# Patient Record
Sex: FEMALE | Race: WHITE | NOT HISPANIC OR LATINO | Employment: OTHER | URBAN - METROPOLITAN AREA
[De-identification: names, ages, dates, MRNs, and addresses within clinical notes are randomized per-mention and may not be internally consistent; named-entity substitution may affect disease eponyms.]

---

## 2017-04-03 ENCOUNTER — TELEPHONE (OUTPATIENT)
Dept: ALLERGY | Facility: CLINIC | Age: 77
End: 2017-04-03

## 2017-04-03 NOTE — TELEPHONE ENCOUNTER
----- Message from Vanessa Haas sent at 4/3/2017 12:31 PM CDT -----  Contact: self  Patient stated that she has welps & swelling on face & neck for longer than 24 hours, she did go to Dermatology & they referred her to Dr Benitez.   the earliest available was 5/2/17 she took that appointment but she really wants to Dr Benitez sooner if at all possible.    Patient's # is [phone]

## 2017-04-17 ENCOUNTER — TELEPHONE (OUTPATIENT)
Dept: ALLERGY | Facility: CLINIC | Age: 77
End: 2017-04-17

## 2017-04-17 ENCOUNTER — HOSPITAL ENCOUNTER (EMERGENCY)
Facility: HOSPITAL | Age: 77
Discharge: HOME | End: 2017-04-17
Attending: EMERGENCY MEDICINE
Payer: MEDICARE

## 2017-04-17 VITALS
DIASTOLIC BLOOD PRESSURE: 72 MMHG | HEART RATE: 74 BPM | WEIGHT: 142 LBS | RESPIRATION RATE: 18 BRPM | SYSTOLIC BLOOD PRESSURE: 168 MMHG | TEMPERATURE: 98 F | HEIGHT: 64 IN | BODY MASS INDEX: 24.24 KG/M2 | OXYGEN SATURATION: 98 %

## 2017-04-17 DIAGNOSIS — T78.40XA ALLERGIC REACTION, INITIAL ENCOUNTER: Primary | ICD-10-CM

## 2017-04-17 PROCEDURE — 99283 EMERGENCY DEPT VISIT LOW MDM: CPT

## 2017-04-17 PROCEDURE — 63600175 PHARM REV CODE 636 W HCPCS: Performed by: PHYSICIAN ASSISTANT

## 2017-04-17 PROCEDURE — 99284 EMERGENCY DEPT VISIT MOD MDM: CPT | Mod: ,,, | Performed by: PHYSICIAN ASSISTANT

## 2017-04-17 RX ORDER — PREDNISONE 20 MG/1
40 TABLET ORAL
Status: COMPLETED | OUTPATIENT
Start: 2017-04-17 | End: 2017-04-17

## 2017-04-17 RX ORDER — PREDNISONE 20 MG/1
40 TABLET ORAL DAILY
Qty: 10 TABLET | Refills: 0 | Status: SHIPPED | OUTPATIENT
Start: 2017-04-17 | End: 2017-04-22

## 2017-04-17 RX ADMIN — PREDNISONE 40 MG: 20 TABLET ORAL at 11:04

## 2017-04-17 NOTE — ED AVS SNAPSHOT
[facility]  [address]  [facility] [address]               Gabriela Edwards   2017  9:41 PM   ED    Description:  Female : 1940   Department:  [facility]           Your Care was Coordinated By:     Provider Role From To    Daisha Mckoy MD Attending Provider 17 --    NATE HansenC Physician Assistant 17 --      Reason for Visit     Allergic Reaction           Diagnoses this Visit        Comments    Allergic reaction, initial encounter    -  Primary       ED Disposition     None           To Do List           Follow-up Information     Follow up with Saravanan aHas MD. Schedule an appointment as soon as possible for a visit in 1 week.    Specialty:  Pulmonary Disease    Contact information:    [address] [facility] [address]  [address]  [facility] [address]  [phone]          Follow up with Allergy . Schedule an appointment as soon as possible for a visit on 2017.      [facility]     [facility] Care [facility] -  Assistance  Unless otherwise directed by your provider, please contact [facility], our nurse care line that is available for  assistance.     Registered nurses in the [facility] provide: appointment scheduling, clinical advisement, health education, and other advisory services.  Call: [phone] (toll free)               Medications           Message regarding Medications     Verify the changes and/or additions to your medication regime listed below are the same as discussed with your clinician today.  If any of these changes or additions are incorrect, please notify your healthcare provider.             Verify that the below list of medications is an accurate representation of the medications you are currently taking.  If none reported, the list may be blank. If incorrect, please contact your healthcare provider. Carry this list with you in case of  "emergency.           Current Medications     levetiracetam (KEPPRA XR) 500 mg Tb24 Take 500 mg by mouth every evening. Pt takes 5 tablets nightly for a total of 2500mg           Clinical Reference Information           Your Vitals Were     BP Pulse Temp Resp Height Weight    172/85 (BP Location: Right arm, Patient Position: Sitting) 71 97.7 °F (36.5 °C) (Oral) 19 5' 4" (1.626 m) 64.4 kg (142 lb)    SpO2 BMI             100% 24.37 kg/m2         Allergies as of 4/17/2017     No Known Allergies      Immunizations Administered on Date of Encounter - 4/17/2017     None      ED Micro, Lab, POCT     None      ED Imaging Orders     None        Discharge Instructions         Drug Reaction: Allergic  You are having an allergic reaction to a drug you have taken. This causes an itchy rash and sometimes swelling of various parts of the body. It may also cause trouble swallowing or breathing. The rash may take a few hours or up to 2 weeks to go away. In the future, remember to tell your doctor about your allergy to this drug so that drugs of this type won't be used again.  Any medicine can cause an allergic reaction. However, penicillin and related drugs, sulfa drugs, aspirin, ibuprofen, and seizure medicines cause the most allergic reactions. Vaccines may also trigger allergies. People whose parents or siblings have allergies are at a higher risk of developing a drug allergy. Allergy testing may sometimes be required to determine the cause.  Symptoms may occur within minutes, hours, or even weeks after exposure to the drug. It can be a mild or severe reaction, or potentially life threatening. Most of us think of allergic reactions when we have a rash or itchy skin. Symptoms can include:  · Rash, hives, redness, welts, blisters  · Itching, burning, stinging, pain  · Dry, flaky, cracking, scaly skin  · Swelling of the face, lips or other parts of the body  · Fever.  Sometimes fever is the only symptom of a drug reaction.  In " elderly people, the risk of fever increases with the number of drugs the older person takes.  More severe symptoms include:  · Trouble swallowing, feeling like your throat is closing  · Trouble breathing, wheezing  · Hoarse voice or trouble speaking  · Nausea, vomiting, diarrhea, stomach cramps  · Feeling faint or lightheaded, rapid heart rate  Home care    The goal of treatment is to help relieve the symptoms, and get you feeling better. Mild to moderate drug reactions usually respond quickly to antihistamines and steroids. The rash will usually fade over several days, but can sometimes last a couple of weeks. Over the next couple of days, there may be times when it is gets a little worse, and then better again. Here are some things to do:  · Throw the drug away and do not take it again. The next reaction may be much worse.  · Add this drug reaction to your electronic medical record.  · When getting a new medicine, always tell the healthcare provider that you are allergic to this drug. Make certain they write it down in your medical record.  · Avoid tight clothing and anything that heats up your skin (hot showers/baths, direct sunlight) since heat will make itching worse.  · An ice pack (ice cubes in a plastic bag, wrapped in a thin towel, or a frozen bag of peas) will relieve local areas of intense itching and redness.  Don't put ice directly on the skin.  · Avoid scratching which may worsen the reaction, damage your skin and lead to an infection.  · Oral Benadryl (diphenhydramine) is an antihistamine available at drug and grocery stores. Unless a prescription antihistamine was given, Benadryl may be used to reduce itching if large areas of the skin are involved. It may make you sleepy, so be careful using it in the daytime or when going to school, working, or driving. [Note: Do not use Benadryl if you have glaucoma or if you are a man with trouble urinating due to an enlarged prostate.]  There are other  antihistamines that cause less drowsiness and are a good alternative for daytime use. Ask your pharmacist for suggestions.  · Do not use Benadryl cream on your skin, because in some people it can cause a further reaction, and make you allergic to Benadryl.  · Calamine lotion or oatmeal baths sometimes help with itching.  Follow-up care  Follow up with your healthcare provider, or as advised if your symptoms do not continue to improve or they get worse.  Call 911  Call 911 if any of these occur:  · Trouble breathing or swallowing, wheezing  · New or worsening swelling in the mouth, throat, or tongue  · Hoarse voice or trouble speaking   · Fainting or loss of consciousness  · Rapid heart rate  · Low blood pressure  · Feeling of doom  · Nausea, vomiting, abdominal pain, diarrhea  When to seek medical advice  Call your healthcare provider right away if any of these occur:  · Shortness of breath  · Increased swelling in the face, eyelids, or lips  · Dizziness, weakness  · Continuing or recurring symptoms  · Spreading areas of itching, redness or swelling  · Signs of infection:  ¨ Spreading redness  ¨ Increased pain or swelling  ¨ Fever (1 degree above your normal temperature) lasting for 24 to 48 hours Or, whatever your healthcare provider told you to report based on your medical condition  ¨ Colored fluid draining from the inflamed area  Date Last Reviewed: 7/30/2015  © 8115-3456 [employer]. [address] [place] [address]. All rights reserved. This information is not intended as a substitute for professional medical care. Always follow your healthcare professional's instructions.          Your Scheduled Appointments     Apr 19, 2017  2:00 PM CDT   New Patient with MD Joshua Sosa - Allergy/ Immunology ([facility])    [address]  [facility] [address]   [phone]              [facility] Sign-Up     Activating your [email or web address] account is  as easy as 1-2-3!     1) Visit [email or web address], select Sign Up Now, enter this activation code and your date of birth, then select Next.  [email or web address]  Expires: 6/1/2017 11:06 PM      2) Create a username and password to use when you visit [facility] in the future and select a security question in case you lose your password and select Next.    3) Enter your e-mail address and click Sign Up!    Additional Information  If you have questions, please e-mail [email or web address] or call [phone] to talk to our [employer] staff. Remember, [facility] is NOT to be used for urgent needs. For medical emergencies, dial 911.          [facility] complies with applicable Federal civil rights laws and does not discriminate on the basis of race, color, national origin, age, disability, or sex.        Language Assistance Services     ATTENTION: Language assistance services are available, free of charge. Please call [phone].      ATENCIÓN: Si habla jason, tiene a camacho disposición servicios gratuitos de asistencia lingüística. Llame al [phone].     CHÚ Ý: N?u b?n nói Ti?ng Vi?t, có các d?ch v? h? tr? ngôn ng? mi?n phí dành cho b?n. G?i s? [phone].

## 2017-04-17 NOTE — TELEPHONE ENCOUNTER
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    Pt can be reached at [phone] or [phone].

## 2017-04-18 ENCOUNTER — TELEPHONE (OUTPATIENT)
Dept: ALLERGY | Facility: CLINIC | Age: 77
End: 2017-04-18

## 2017-04-18 NOTE — TELEPHONE ENCOUNTER
----- Message from Tierra Vera sent at 4/18/2017  9:30 AM CDT -----  Contact: Self/[phone] cell   Pt said that she is calling in regards to she was in the E.R yesterday at [facility] and pt was told to call the office to get an appointment today, pt stated that she has some medication so if she cannot get in today that she will just wait until the appointment that she has for tomorrow. Please call and advise            Thank you

## 2017-04-18 NOTE — ED PROVIDER NOTES
Encounter Date: 4/17/2017    SCRIBE #1 NOTE: I, Guillermo Plascencia, am scribing for, and in the presence of, Shobha Luciano PA-C.   SCRIBE #2 NOTE: I, Raffaele Arango, roberth scribing for, and in the presence of,  Dr. Duong. I have scribed the following portions of the note - the APC attestation.     History     Chief Complaint   Patient presents with    Allergic Reaction     since january pt has had 3 steriod shots which relieve and then comes back. pt has a redness throughout body. swelling to bilateral ankles. pt denies chest pain, SOB     Review of patient's allergies indicates:  No Known Allergies  HPI Comments: Time seen by provider: 10:12 PM    This is a 76 y.o. female who presents with complaint of generalized rash and bilateral lower extremity  swelling down to her ankles.  Pt states having multiple episodes of similar rash since January 2017, has had decadron in the past with improvement. She states that this episode started about 1 week ago, for which she had decadron injection 2 days ago by her dermatologist with improvement, but then had re-exacerbation of the rash this morning.     Pt states the rashes come irregularly, usually starting with erythema at her neck which then propagates to her chest and ears. However, this is apparently the first time the rash has extended to her bilateral upper and lower extremities and with associated swelling. She states she has not started using any new detergents, has not had recent significant sun exposure, or any recent change in diet or medications.  Pt states taking Keppra for the last 10 years for seizures but no other medications.  She does report history of eczema and uses an ointment for this. Pt denies any other medical complaints at this time.  No difficulty swallowing or shortness of breath.  Due to above symptoms patient did present to the emergency department.     The history is provided by the patient.     Past Medical History:   Diagnosis Date    Cataract      Epilepsy      Past Surgical History:   Procedure Laterality Date    WRIST SURGERY Right     2009     Family History   Problem Relation Age of Onset    Cancer Brother      Social History   Substance Use Topics    Smoking status: Never Smoker    Smokeless tobacco: None    Alcohol use 0.6 oz/week     1 Glasses of wine per week     Review of Systems   Constitutional: Negative for fever.   HENT: Negative for congestion.    Eyes: Negative for visual disturbance.   Respiratory: Negative for shortness of breath.    Cardiovascular: Positive for leg swelling. Negative for chest pain.   Gastrointestinal: Negative for abdominal pain.   Genitourinary: Negative for difficulty urinating.   Musculoskeletal: Negative for neck pain.   Skin: Positive for rash (rash to extremities, chest, and neck). Negative for pallor.   Neurological: Negative for headaches.       Physical Exam   Initial Vitals   BP Pulse Resp Temp SpO2   04/17/17 2034 04/17/17 2034 04/17/17 2034 04/17/17 2034 04/17/17 2034   172/85 71 19 97.7 °F (36.5 °C) 100 %     Physical Exam    Nursing note and vitals reviewed.  Constitutional: She appears well-developed and well-nourished. She is not diaphoretic. No distress.   HENT:   Mouth/Throat: Oropharynx is clear and moist. No oropharyngeal exudate.   Eyes: EOM are normal. Pupils are equal, round, and reactive to light. No scleral icterus.   Neck: Normal range of motion. Neck supple. No thyromegaly present. No tracheal deviation present.   Cardiovascular: Normal rate, regular rhythm and normal heart sounds. Exam reveals no gallop and no friction rub.    No murmur heard.  Pulmonary/Chest: Breath sounds normal. No stridor. She has no wheezes. She has no rhonchi. She has no rales.   Abdominal: Soft. There is no tenderness. There is no rebound and no guarding.   Musculoskeletal: She exhibits edema.   Neurological: She is alert and oriented to person, place, and time. She has normal strength. No cranial nerve deficit or  sensory deficit.   Skin: Skin is warm and dry. Rash noted. There is erythema.   Redness across chest, abdomen, and bilateral lower extremities. Skin flaking on head and neck.  Lower extremity edema    Psychiatric: She has a normal mood and affect. Thought content normal.         ED Course   Procedures  Labs Reviewed - No data to display          Medical Decision Making:   History:   Old Medical Records: I decided to obtain old medical records.       APC / Resident Notes:   This is a 76 y.o. female who presents with complaint of generalized rash and bilateral lower extremity swelling down to her ankles.  Physical exam reveals anxious female in no acute distress.  Heart regular rate and rhythm.  Lungs clear to auscultation bilaterally.  Erythematous rash on neck extending to her chest and upper extremities bilaterally and abdomen.  No oral lesions noted.  Lower extremity edema noted.    Patient will be given 40 mg of prednisone ×5 days and is to keep appointment with allergist on Wednesday.  Patient told to return if symptoms worsen or patient develops trouble swallowing or shortness of breath.  Plan of treatment discussed with attending physician and she is agreeable.           Scribe Attestation:   Scribe #1: I performed the above scribed service and the documentation accurately describes the services I performed. I attest to the accuracy of the note.  Scribe #2: I performed the above scribed service and the documentation accurately describes the services I performed. I attest to the accuracy of the note.    Attending Attestation:     Physician Attestation Statement for NP/PA:   I discussed this assessment and plan of this patient with the NP/PA, but I did not personally examine the patient. The face to face encounter was performed by the NP/PA.    Other NP/PA Attestation Additions:    History of Present Illness: 76 y.o. woman complains of all over body redness, rash, itching. Seen by dermatology and given a dose of  decadron. Scheduled to see allergy on the 19th. Had some improvement with the decadron but now reports rash is worse.    Medical Decision Making: Will place on short course of oral steroids prior to seeing allergy in 2 days. No evidence of mucous membrane involvement        Physician Attestation for Scribe:  Physician Attestation Statement for Scribe #1: I, Shobha Luciano PA-C, reviewed documentation, as scribed by Guillermo Plascencia in my presence, and it is both accurate and complete.   Physician Attestation Statement for Scribe #2: I, Dr. Duong, reviewed documentation, as scribed by Raffaele Arango in my presence, and it is both accurate and complete. I also acknowledge and confirm the content of the note done by Scribe #1.              ED Course     Clinical Impression:   The encounter diagnosis was Allergic reaction, initial encounter.          Shobha Luciano PA-C  04/18/17 0022

## 2017-04-18 NOTE — ED TRIAGE NOTES
Pt with history of allergic reaction. Pt states that she is supposed to see an Allergist this week on Wednesday. Pt received Decadron shot Saturday and was told to come to ED if symptoms got worse. Pt denies SOB or C/P. Pt has red rash all over body from neck down. Pt c/o intermittent itching.

## 2017-04-18 NOTE — DISCHARGE INSTRUCTIONS
Drug Reaction: Allergic  You are having an allergic reaction to a drug you have taken. This causes an itchy rash and sometimes swelling of various parts of the body. It may also cause trouble swallowing or breathing. The rash may take a few hours or up to 2 weeks to go away. In the future, remember to tell your doctor about your allergy to this drug so that drugs of this type won't be used again.  Any medicine can cause an allergic reaction. However, penicillin and related drugs, sulfa drugs, aspirin, ibuprofen, and seizure medicines cause the most allergic reactions. Vaccines may also trigger allergies. People whose parents or siblings have allergies are at a higher risk of developing a drug allergy. Allergy testing may sometimes be required to determine the cause.  Symptoms may occur within minutes, hours, or even weeks after exposure to the drug. It can be a mild or severe reaction, or potentially life threatening. Most of us think of allergic reactions when we have a rash or itchy skin. Symptoms can include:  · Rash, hives, redness, welts, blisters  · Itching, burning, stinging, pain  · Dry, flaky, cracking, scaly skin  · Swelling of the face, lips or other parts of the body  · Fever.  Sometimes fever is the only symptom of a drug reaction.  In elderly people, the risk of fever increases with the number of drugs the older person takes.  More severe symptoms include:  · Trouble swallowing, feeling like your throat is closing  · Trouble breathing, wheezing  · Hoarse voice or trouble speaking  · Nausea, vomiting, diarrhea, stomach cramps  · Feeling faint or lightheaded, rapid heart rate  Home care    The goal of treatment is to help relieve the symptoms, and get you feeling better. Mild to moderate drug reactions usually respond quickly to antihistamines and steroids. The rash will usually fade over several days, but can sometimes last a couple of weeks. Over the next couple of days, there may be times when it is  gets a little worse, and then better again. Here are some things to do:  · Throw the drug away and do not take it again. The next reaction may be much worse.  · Add this drug reaction to your electronic medical record.  · When getting a new medicine, always tell the healthcare provider that you are allergic to this drug. Make certain they write it down in your medical record.  · Avoid tight clothing and anything that heats up your skin (hot showers/baths, direct sunlight) since heat will make itching worse.  · An ice pack (ice cubes in a plastic bag, wrapped in a thin towel, or a frozen bag of peas) will relieve local areas of intense itching and redness.  Don't put ice directly on the skin.  · Avoid scratching which may worsen the reaction, damage your skin and lead to an infection.  · Oral Benadryl (diphenhydramine) is an antihistamine available at drug and grocery stores. Unless a prescription antihistamine was given, Benadryl may be used to reduce itching if large areas of the skin are involved. It may make you sleepy, so be careful using it in the daytime or when going to school, working, or driving. [Note: Do not use Benadryl if you have glaucoma or if you are a man with trouble urinating due to an enlarged prostate.]  There are other antihistamines that cause less drowsiness and are a good alternative for daytime use. Ask your pharmacist for suggestions.  · Do not use Benadryl cream on your skin, because in some people it can cause a further reaction, and make you allergic to Benadryl.  · Calamine lotion or oatmeal baths sometimes help with itching.  Follow-up care  Follow up with your healthcare provider, or as advised if your symptoms do not continue to improve or they get worse.  Call 911  Call 911 if any of these occur:  · Trouble breathing or swallowing, wheezing  · New or worsening swelling in the mouth, throat, or tongue  · Hoarse voice or trouble speaking   · Fainting or loss of consciousness  · Rapid  heart rate  · Low blood pressure  · Feeling of doom  · Nausea, vomiting, abdominal pain, diarrhea  When to seek medical advice  Call your healthcare provider right away if any of these occur:  · Shortness of breath  · Increased swelling in the face, eyelids, or lips  · Dizziness, weakness  · Continuing or recurring symptoms  · Spreading areas of itching, redness or swelling  · Signs of infection:  ¨ Spreading redness  ¨ Increased pain or swelling  ¨ Fever (1 degree above your normal temperature) lasting for 24 to 48 hours Or, whatever your healthcare provider told you to report based on your medical condition  ¨ Colored fluid draining from the inflamed area  Date Last Reviewed: 7/30/2015 © 2000-2016 [employer]. [address] [place] [address], [place] [address]. All rights reserved. This information is not intended as a substitute for professional medical care. Always follow your healthcare professional's instructions.

## 2017-04-19 ENCOUNTER — LAB VISIT (OUTPATIENT)
Dept: LAB | Facility: HOSPITAL | Age: 77
End: 2017-04-19
Attending: STUDENT IN AN ORGANIZED HEALTH CARE EDUCATION/TRAINING PROGRAM
Payer: MEDICARE

## 2017-04-19 ENCOUNTER — OFFICE VISIT (OUTPATIENT)
Dept: ALLERGY | Facility: CLINIC | Age: 77
End: 2017-04-19
Payer: MEDICARE

## 2017-04-19 VITALS
BODY MASS INDEX: 24.92 KG/M2 | SYSTOLIC BLOOD PRESSURE: 140 MMHG | OXYGEN SATURATION: 98 % | HEART RATE: 73 BPM | HEIGHT: 64 IN | WEIGHT: 145.94 LBS | DIASTOLIC BLOOD PRESSURE: 82 MMHG

## 2017-04-19 DIAGNOSIS — L30.9 ECZEMA, UNSPECIFIED TYPE: Primary | ICD-10-CM

## 2017-04-19 DIAGNOSIS — L29.9 PRURITUS: ICD-10-CM

## 2017-04-19 DIAGNOSIS — T78.3XXA ANGIOEDEMA, INITIAL ENCOUNTER: ICD-10-CM

## 2017-04-19 DIAGNOSIS — L50.9 URTICARIA: ICD-10-CM

## 2017-04-19 DIAGNOSIS — L50.9 HIVES: ICD-10-CM

## 2017-04-19 DIAGNOSIS — L30.9 ECZEMA, UNSPECIFIED TYPE: ICD-10-CM

## 2017-04-19 LAB
BASOPHILS # BLD AUTO: 0.02 K/UL
BASOPHILS NFR BLD: 0.2 %
DIFFERENTIAL METHOD: NORMAL
EOSINOPHIL # BLD AUTO: 0.5 K/UL
EOSINOPHIL NFR BLD: 6.4 %
ERYTHROCYTE [DISTWIDTH] IN BLOOD BY AUTOMATED COUNT: 14 %
HCT VFR BLD AUTO: 39.4 %
HGB BLD-MCNC: 13.2 G/DL
LYMPHOCYTES # BLD AUTO: 1.7 K/UL
LYMPHOCYTES NFR BLD: 20.9 %
MCH RBC QN AUTO: 30.1 PG
MCHC RBC AUTO-ENTMCNC: 33.5 %
MCV RBC AUTO: 90 FL
MONOCYTES # BLD AUTO: 0.8 K/UL
MONOCYTES NFR BLD: 9.6 %
NEUTROPHILS # BLD AUTO: 5.2 K/UL
NEUTROPHILS NFR BLD: 62.5 %
PLATELET # BLD AUTO: 276 K/UL
PMV BLD AUTO: 10.2 FL
RBC # BLD AUTO: 4.39 M/UL
TSH SERPL DL<=0.005 MIU/L-ACNC: 3.27 UIU/ML
WBC # BLD AUTO: 8.31 K/UL

## 2017-04-19 PROCEDURE — 86800 THYROGLOBULIN ANTIBODY: CPT

## 2017-04-19 PROCEDURE — 36415 COLL VENOUS BLD VENIPUNCTURE: CPT

## 2017-04-19 PROCEDURE — 99204 OFFICE O/P NEW MOD 45 MIN: CPT | Mod: S$PBB,GC,, | Performed by: STUDENT IN AN ORGANIZED HEALTH CARE EDUCATION/TRAINING PROGRAM

## 2017-04-19 PROCEDURE — 84165 PROTEIN E-PHORESIS SERUM: CPT

## 2017-04-19 PROCEDURE — 99999 PR PBB SHADOW E&M-EST. PATIENT-LVL III: CPT | Mod: PBBFAC,GC,, | Performed by: STUDENT IN AN ORGANIZED HEALTH CARE EDUCATION/TRAINING PROGRAM

## 2017-04-19 PROCEDURE — 84443 ASSAY THYROID STIM HORMONE: CPT

## 2017-04-19 PROCEDURE — 85025 COMPLETE CBC W/AUTO DIFF WBC: CPT

## 2017-04-19 PROCEDURE — 86376 MICROSOMAL ANTIBODY EACH: CPT

## 2017-04-19 PROCEDURE — 84165 PROTEIN E-PHORESIS SERUM: CPT | Mod: 26,,, | Performed by: PATHOLOGY

## 2017-04-19 NOTE — MR AVS SNAPSHOT
"    [facility] - Allergy/ Immunology  [address]  [place] [address]  Phone: [phone]  Fax: [phone]                  Gabriela Edwards   2017 2:00 PM   Office Visit    Description:  Female : 1940   Provider:  Daly Potter MD   Department:  [facility] - Allergy/ Immunology           Reason for Visit     Urticaria                To Do List           Goals (5 Years of Data)     None      [facility]     [facility] -  Assistance  Unless otherwise directed by your provider, please contact [facility], our nurse care line that is available for  assistance.     Registered nurses in the [facility] provide: appointment scheduling, clinical advisement, health education, and other advisory services.  Call: [phone] (toll free)               Medications           Message regarding Medications     Verify the changes and/or additions to your medication regime listed below are the same as discussed with your clinician today.  If any of these changes or additions are incorrect, please notify your healthcare provider.             Verify that the below list of medications is an accurate representation of the medications you are currently taking.  If none reported, the list may be blank. If incorrect, please contact your healthcare provider. Carry this list with you in case of emergency.           Current Medications     levetiracetam (KEPPRA XR) 500 mg Tb24 Take 500 mg by mouth every evening. Pt takes 5 tablets nightly for a total of 2500mg    predniSONE (DELTASONE) 20 MG tablet Take 2 tablets (40 mg total) by mouth once daily.           Clinical Reference Information           Your Vitals Were     BP Pulse Height Weight SpO2 BMI    140/82 (BP Location: Left arm, Patient Position: Sitting, BP Method: Manual) 73 5' 4" (1.626 m) 66.2 kg (145 lb 15.1 oz) 98% 25.05 kg/m2      Blood Pressure          Most Recent Value    BP  (!)  140/82    "   Allergies as of 4/19/2017     No Known Allergies      Immunizations Administered on Date of Encounter - 4/19/2017     None      [facility] Sign-Up     Activating your [facility] account is as easy as 1-2-3!     1) Visit [email or web address], select Sign Up Now, enter this activation code and your date of birth, then select Next.  [email or web address]  Expires: 6/1/2017 11:06 PM      2) Create a username and password to use when you visit [facility] in the future and select a security question in case you lose your password and select Next.    3) Enter your e-mail address and click Sign Up!    Additional Information  If you have questions, please e-mail [email or web address] or call [phone] to talk to our [facility] staff. Remember, [facility] is NOT to be used for urgent needs. For medical emergencies, dial 911.         Instructions    1. Moisturize the skin twice daily.  2.To apply triamcinolone 0.1% ointment twice daily to affected skin area until all lesions are resolved.  3.to apply tacrolimus 0.1% ointment to affected skin as per dermatology directed.  4.to take levocetirizine 5 mg 2 tablets twice daily  5.to take hydroxyzine HCL 25mg only for itching  6.Recommend to have skin biopsy at dermatology       Language Assistance Services     ATTENTION: Language assistance services are available, free of charge. Please call [phone].      ATENCIÓN: Si habla jason, tiene a camacho disposición servicios gratuitos de asistencia lingüística. Llame al [phone].     VALENTINA Ý: N?u b?n nói Ti?ng Vi?t, có các d?ch v? h? tr? ngôn ng? mi?n phí dành cho b?n. G?i s? [phone].         [facility] - [facility]/ [facility] complies with applicable Federal civil rights laws and does not discriminate on the basis of race, color, national origin, age, disability, or sex.

## 2017-04-19 NOTE — PROGRESS NOTES
ALLERGY & IMMUNOLOGY CLINIC - INITIAL CONSULTATION      HISTORY OF PRESENT ILLNESS     Patient ID: Gabriela Edwards is a 76 y.o. female    CC: poorly controlled eczema with urticaria and angioedema    HPI: This is 77 yo woman with h/o dust mite allergy and eczema presented here for evaluation. As per patient, she has been diagnosed with eczema 1.5yrs ago. She is seeing dermatology and she has been put on triamcinolone 0.1% ointment and tacrolimus 0.1% ointment twice daily. She uses eurecin moisturizer twice daily. However, she is not using steroid ointment regularly because of side effects of medication such as atrophy and scarring etc. Her eczema is on lower extremities, flexor of forearms and neck, back of ear, and face. It is also itchy. Now it flares up more frequently, and her skin not cleared up from eczema for the last 6 months.  Hives/urticaria: Last year, October, she developed hives also. It is very itchy. She takes atarax 3 times a day for itching.And takes levocetirizine 5mg daily. It takes 3 days -3 weeks to go away of urticaria.She is symptoms free for few days and then she has hives again. She has received injections of steroid for total 4 times in last 6 months for hives also. On 4/16/17, she c/o swelling of lower legs one day after steroid injection. She also c/o of hives. Today, hives were gone. But she still has swelling in her lower extremities.  H/o fever blisters: she was worsening of fever blisters for last 2 months, off and on. She has been treated with acyclovir 5% ointment and valacyclovir 500 mg.   Chronic rhinitis: she has rhinitis for years, she has skin tested and dust mite allergic when she was young. She had dust mite IT for 5 years from 14 yo to 21yo.Her symptoms are well-controlled with flonase nasal spray. And anti-histmaine as needed.   She also has patch testing done at dermatology, was positive to nickel,cabamix, 8-ehahx3-fwohpeoqjdhe-1,3 diol, imidazolidinyl urea.        "REVIEW OF SYSTEMS     CONST: no F/C/NS, +Anxiety, + hair loss,+ weight gain.  NEURO: no H/A, no weakness, no paresthesias  EYES: no discharge, no pruritus, no erythema  EARS: no hearing loss, no sensation of fullness  NOSE: no congestion, no rhinorrhea, no discharge, no itching, no sneezing  PULM: no SOB, no wheezing, no cough, no snoring  CV: no CP, no palpitations, no leg swelling  GI: no dysphagia, no heartburn, no pain, no N/V/D, no BRBPR/melena  URO: no dysuria, no hematuria, no nocturia  MSK: no joint pain, no muscle pain  DERM: today, she has eczema erythematous skin lesions on her lower extremities, upper arms, neck, chest ,back and back of the ears.c/o itching      MEDICAL HISTORY     MedHx: active problems reviewed  SurgHx: none  SocHx: ,. Lives alone , no pets. No dusts no molds. No smoke , no alcohol  FamHx: not significant of asthma, eczema ,immunodeficiency in the family.  Allergies: see below  Medications: MAR reviewed    H/o Asthma: denies  H/o Eczema: denies  Oral Allergy:  denies  Food Allergy: denies  Venom Allergy: denies  Latex Allergy: denies  Other Allergies: denies  Env/Occ: denies any evironmental or occupational exposures     PHYSICAL EXAM     VS: BP (!) 140/82 (BP Location: Left arm, Patient Position: Sitting, BP Method: Manual)  Pulse 73  Ht 5' 4" (1.626 m)  Wt 66.2 kg (145 lb 15.1 oz)  SpO2 98%  BMI 25.05 kg/m2  GENERAL: AAOx4, NAD, well-appearing, cooperative  EYES: PERRL, EOMI, no conjunctival injection, no discharge, no infraorbital shiners  EARS: external auditory canals normal B/L, TM normal B/L  NOSE: NT + and  B/L, stringing mucous, no polyps  ORAL: MMM, no ulcers, no thrush, no cobblestoning  NECK: supple, trachea midline, no thyromegaly, no LAD  LUNGS: CTAB, no w/r/c, no increased WOB  HEART: RRR, normal S1/S2, no m/g/r  ABDOMEN: BS present, soft, non-tender, non-distended, no HSM  EXTREMITIES: +2 distal pulses, no c/c/e  LYMPHATICS: no cervical/submandibular LAD, " no axillary LAD, no inguinal LAD  DERM:eczema erythematous skin lesions on her lower extremities, upper arms, neck, chest ,back and back of the ears.  NEURO: normal gait, no facial asymmetry       CHART REVIEW     Reviewed.     ASSESSMENT & PLAN     Gabriela Edwards is a 76 y.o. female with     Severe eczema: Adult onset eczema poorly controlled which could be due to not taking of steroid ointment as directed or underlying causes such as lymphoma or malignancy.  -to reinforce importance of medications to take regularly  -to apply triamcinolone 0.1% ointment twice daily and tacrolimus 0.1% ointment daily as directed per dermatology.  -suggest to get skin biopsy in dermatology office.    Hives with itchiness: She developed hives intermittently and last episode was with angioedema lower legs.   -to take zytrec 10mg tablets twice daily with pepcid 150mg twice daily.  -to obtain CBC,TSH, thyroid antibodies, thyroglobulin antibody,SPEP.    Monitor on fever blisters symptoms.  Follow up: 8 weeks    Discussed with Dr.Montellibano Pillo Potter MD  Allergy/Immunology Fellow

## 2017-04-19 NOTE — PATIENT INSTRUCTIONS
1. Moisturize the skin twice daily.  2.To apply triamcinolone 0.1% ointment twice daily to affected skin area until all lesions are resolved.  3.to apply tacrolimus 0.1% ointment to affected skin as per dermatology directed.  4.to take levocetirizine 5 mg 2 tablets twice daily  5.to take hydroxyzine HCL 25mg only for itching  6.Recommend to have skin biopsy at dermatology

## 2017-04-20 LAB
ALBUMIN SERPL ELPH-MCNC: 3.85 G/DL
ALPHA1 GLOB SERPL ELPH-MCNC: 0.3 G/DL
ALPHA2 GLOB SERPL ELPH-MCNC: 0.86 G/DL
B-GLOBULIN SERPL ELPH-MCNC: 0.8 G/DL
GAMMA GLOB SERPL ELPH-MCNC: 0.88 G/DL
PATHOLOGIST INTERPRETATION SPE: NORMAL
PROT SERPL-MCNC: 6.7 G/DL
THYROGLOB AB SERPL IA-ACNC: <4 IU/ML
THYROPEROXIDASE IGG SERPL-ACNC: <6 IU/ML

## 2017-04-20 RX ORDER — TRIAMCINOLONE ACETONIDE 1 MG/G
CREAM TOPICAL 2 TIMES DAILY
Qty: 454 G | Refills: 6 | Status: SHIPPED | OUTPATIENT
Start: 2017-04-20 | End: 2019-03-11 | Stop reason: SDUPTHER

## 2017-04-20 RX ORDER — LEVOCETIRIZINE DIHYDROCHLORIDE 5 MG/1
5 TABLET, FILM COATED ORAL NIGHTLY
Qty: 30 TABLET | Refills: 6 | Status: SHIPPED | OUTPATIENT
Start: 2017-04-20 | End: 2017-08-30 | Stop reason: SDUPTHER

## 2017-04-27 NOTE — PROGRESS NOTES
I have personally taken the history and examined this patient and agree with the assessment and plan as per fellow's note.

## 2017-05-04 RX ORDER — LEVOCETIRIZINE DIHYDROCHLORIDE 5 MG/1
10 TABLET, FILM COATED ORAL 2 TIMES DAILY
Qty: 120 TABLET | Refills: 1 | Status: SHIPPED | OUTPATIENT
Start: 2017-05-04 | End: 2017-06-29 | Stop reason: SDUPTHER

## 2017-05-04 NOTE — TELEPHONE ENCOUNTER
----- Message from Alan Abbott sent at 5/4/2017 12:14 PM CDT -----  Contact: Shob[facility]/ [facility]/ [phone] ph  Pharmacist would like to speak with someone in the office to discuss the pt's medication levocetirizine (XYZAL) 5 MG tablet.  The pt has been taking 4 tablets per day and stated that she was told to do so by the doctor.  The pharmacist would like to speak with someone in the office to discuss.  Please contact the pt and find out exactly what she is taking.  The insurance will not cover the refill, because the pt just picked up a script on 04/20/2017 and has been taking 4 per day.  Please call and advise.      Thank you

## 2017-05-04 NOTE — TELEPHONE ENCOUNTER
I spoke to Guillermo with [facility]. He would not let me call in Xyzal 5 mg 2 tablets BID.    Can you send in that rx?    I also spoke to patient who was upset stating that the pharmacist told her that she should not be on that high dose because her Kidneys would fail. Explained to patient that she is on that dose for a short period of time to get Hives and angioedema under control. Patient verbalized understanding.

## 2017-05-04 NOTE — TELEPHONE ENCOUNTER
Pt called in stating her Rx for levoceterizine had not been received by the pharmacy.  Chart shows it was printed out, but pt states she didn't receive it.  [facility]ed [facility] Rx.  Patient was advised this was called in.

## 2017-06-07 ENCOUNTER — OFFICE VISIT (OUTPATIENT)
Dept: ALLERGY | Facility: CLINIC | Age: 77
End: 2017-06-07
Payer: MEDICARE

## 2017-06-07 VITALS
TEMPERATURE: 98 F | WEIGHT: 141.13 LBS | HEIGHT: 64 IN | DIASTOLIC BLOOD PRESSURE: 70 MMHG | SYSTOLIC BLOOD PRESSURE: 120 MMHG | BODY MASS INDEX: 24.1 KG/M2

## 2017-06-07 DIAGNOSIS — L50.8 URTICARIA, CHRONIC: Primary | ICD-10-CM

## 2017-06-07 DIAGNOSIS — L30.9 ECZEMA, UNSPECIFIED TYPE: ICD-10-CM

## 2017-06-07 DIAGNOSIS — L29.9 ITCHING: ICD-10-CM

## 2017-06-07 DIAGNOSIS — L50.1 URTICARIA, IDIOPATHIC: ICD-10-CM

## 2017-06-07 PROBLEM — J30.9 ALLERGIC RHINITIS: Status: ACTIVE | Noted: 2017-06-07

## 2017-06-07 PROCEDURE — 99214 OFFICE O/P EST MOD 30 MIN: CPT | Mod: S$PBB,GC,, | Performed by: STUDENT IN AN ORGANIZED HEALTH CARE EDUCATION/TRAINING PROGRAM

## 2017-06-07 PROCEDURE — 99213 OFFICE O/P EST LOW 20 MIN: CPT | Mod: PBBFAC | Performed by: STUDENT IN AN ORGANIZED HEALTH CARE EDUCATION/TRAINING PROGRAM

## 2017-06-07 PROCEDURE — 1159F MED LIST DOCD IN RCRD: CPT | Mod: GC,,, | Performed by: STUDENT IN AN ORGANIZED HEALTH CARE EDUCATION/TRAINING PROGRAM

## 2017-06-07 PROCEDURE — 99999 PR PBB SHADOW E&M-EST. PATIENT-LVL III: CPT | Mod: PBBFAC,GC,, | Performed by: STUDENT IN AN ORGANIZED HEALTH CARE EDUCATION/TRAINING PROGRAM

## 2017-06-07 NOTE — PATIENT INSTRUCTIONS
1.To continue levo-cetirizine 5mg 1tab am and 2tabs pm, can wean down the dose when symptoms improve for itching with hives.  2.To apply triancinlocream and tacrolimus for eczema flare up for 2 weeks and then as needed.  3.To moisturize the skin well at least twice daily.  4. To continue flonase nasal spray as needed for rhinitis.

## 2017-06-07 NOTE — PROGRESS NOTES
Progress note: Follow up      Patient ID: Gabriela Edwards is a 76 y.o. female     CC: follow up for poorly controlled eczema with urticaria and angioedema     HPI: This is 75 yo woman with h/o dust mite allergy and eczema presented here for evaluation.     Hives/urticaria and itching of skin: She developed itching of skin with urticaria more than a year. She was prescribed cetirizine 20 mg twice daily.now she takes levo-cetirizine 5 mg 1tab  am and 2 tabs pm and esclate the dose if she has more symptoms. She did not have any steroid injections over last 2 months. She uses steroid cream as needed also for itching.   Patient reported to be responding to the regime and her symptoms are significantly improved.    Eczema: eczema is also improved. Only when she has active lesions she uses triamcinolone cream. She moisturizes her skin well. It is well-controlled.    Chronic rhinitis: she has rhinitis for years, she has skin tested and dust mite allergic when she was young. She had dust mite IT for 5 years from 14 yo to 21yo.Her symptoms are well-controlled with flonase nasal spray. And anti-histmaine as needed.   She also has patch testing done at dermatology, was positive to nickel,cabamix, 2-oldif4-tavnruoauqhe-1,3 diol, imidazolidinyl urea.         REVIEW OF SYSTEMS      CONST: no F/C/NS, +Anxiety, + hair loss,+ weight gain.  NEURO: no H/A, no weakness, no paresthesias  EYES: no discharge, no pruritus, no erythema  EARS: no hearing loss, no sensation of fullness  NOSE: no congestion, no rhinorrhea, no discharge, no itching, no sneezing  PULM: no SOB, no wheezing, no cough, no snoring  CV: no CP, no palpitations, no leg swelling  GI: no dysphagia, no heartburn, no pain, no N/V/D, no BRBPR/melena  URO: no dysuria, no hematuria, no nocturia  MSK: no joint pain, no muscle pain  DERM: today, she has eczema erythematous skin lesions on her lower extremities, upper arms, neck, chest ,back and back of the ears.c/o itching        MEDICAL HISTORY      MedHx: active problems reviewed  SurgHx: none  SocHx: ,. Lives alone , no pets. No dusts no molds. No smoke , no alcohol  FamHx: not significant of asthma, eczema ,immunodeficiency in the family.  Allergies: see below  Medications: MAR reviewed     H/o Asthma: denies  H/o Eczema: denies  Oral Allergy:  denies  Food Allergy: denies  Venom Allergy: denies  Latex Allergy: denies  Other Allergies: denies  Env/Occ: denies any evironmental or occupational exposures      PHYSICAL EXAM      VS: /70mmHg, T 97.8F (36.6C)  GENERAL: AAOx4, NAD, well-appearing, cooperative  EYES: PERRL, EOMI, no conjunctival injection, no discharge, no infraorbital shiners  EARS: external auditory canals normal B/L, TM normal B/L  NOSE: NT + and  B/L, stringing mucous, no polyps  ORAL: MMM, no ulcers, no thrush, no cobblestoning  NECK: supple, trachea midline, no thyromegaly, no LAD  LUNGS: CTAB, no w/r/c, no increased WOB  HEART: RRR, normal S1/S2, no m/g/r  ABDOMEN: BS present, soft, non-tender, non-distended, no HSM  EXTREMITIES: +2 distal pulses, no c/c/e  LYMPHATICS: no cervical/submandibular LAD, no axillary LAD, no inguinal LAD  DERM:eczema erythematous skin lesions on her lower extremities, upper arms, neck, chest ,back and back of the ears.  NEURO: normal gait, no facial asymmetry         CHART REVIEW      Reviewed.      ASSESSMENT & PLAN      Gabriela Edwards is a 76 y.o. female with      1.Severe eczema: It is improved and well-controlled. No flare up since 2 months ago.   -To apply triamcinolone 0.1% ointment twice daily and tacrolimus 0.1% ointment daily as directed when flare up eczema.   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 Responding to levocetirizine current regime as directed.   -to take levocetirizine 5 mg tablets 4times daily with pepcid 150mg twice daily  and can wean down when symptoms improve.   -Explained to the patient regarding her lab work CBC,TSH,CMP,and SPEP were normal range.    3.Chronic rhinitis: well-controlled   -Continue the current treatment. flonase nasal spray 1 SEN once to twice daily.    To follow up in 1 year.    Pillo Potter M.D  Allergy/Immunology fellow.

## 2017-06-27 RX ORDER — LEVOCETIRIZINE DIHYDROCHLORIDE 5 MG/1
10 TABLET, FILM COATED ORAL 2 TIMES DAILY
Qty: 120 TABLET | Refills: 1 | Status: CANCELLED | OUTPATIENT
Start: 2017-06-27 | End: 2018-06-27

## 2017-06-29 RX ORDER — LEVOCETIRIZINE DIHYDROCHLORIDE 5 MG/1
10 TABLET, FILM COATED ORAL 2 TIMES DAILY
Qty: 120 TABLET | Refills: 1 | Status: SHIPPED | OUTPATIENT
Start: 2017-06-29 | End: 2017-08-30 | Stop reason: SDUPTHER

## 2017-08-29 RX ORDER — LEVOCETIRIZINE DIHYDROCHLORIDE 5 MG/1
5 TABLET, FILM COATED ORAL NIGHTLY
Qty: 30 TABLET | Refills: 6 | Status: CANCELLED | OUTPATIENT
Start: 2017-08-29

## 2017-08-29 NOTE — TELEPHONE ENCOUNTER
Last office visit 6/7/17. Request for refill on Xyzal.  Can you please refill. Patient is totally out.

## 2017-08-29 NOTE — TELEPHONE ENCOUNTER
----- Message from Alan We[place]outh sent at 8/29/2017  9:48 AM CDT -----  Contact: self/ [phone] cell  Type: Rx    Name of medication(s): levocetirizine (XYZAL) 5 MG tablet    Is this a refill? New rx? Refill    Who prescribed medication? Dr. Potter     Pharmacy Name, Phone, & Location: [facility] on file    Comments: pt states that the pharmacy has been telling her for a week that they have been sending in request, but nothing is in the system.  She is completely out and would like to request that it be sent in today, if possible.  Please call and advise.    Thank you

## 2017-08-30 RX ORDER — LEVOCETIRIZINE DIHYDROCHLORIDE 5 MG/1
TABLET, FILM COATED ORAL
Qty: 120 TABLET | Refills: 0 | Status: SHIPPED | OUTPATIENT
Start: 2017-08-30 | End: 2017-09-12 | Stop reason: SDUPTHER

## 2017-08-30 RX ORDER — LEVOCETIRIZINE DIHYDROCHLORIDE 5 MG/1
5 TABLET, FILM COATED ORAL NIGHTLY
Qty: 30 TABLET | Refills: 6 | Status: SHIPPED | OUTPATIENT
Start: 2017-08-30 | End: 2017-09-13 | Stop reason: SDUPTHER

## 2017-09-12 DIAGNOSIS — J30.9 ALLERGIC RHINITIS, UNSPECIFIED CHRONICITY, UNSPECIFIED SEASONALITY, UNSPECIFIED TRIGGER: Primary | ICD-10-CM

## 2017-09-12 RX ORDER — LEVOCETIRIZINE DIHYDROCHLORIDE 5 MG/1
5 TABLET, FILM COATED ORAL NIGHTLY
Qty: 120 TABLET | Refills: 1 | Status: SHIPPED | OUTPATIENT
Start: 2017-09-12 | End: 2017-09-13 | Stop reason: SDUPTHER

## 2017-09-13 ENCOUNTER — OFFICE VISIT (OUTPATIENT)
Dept: ALLERGY | Facility: CLINIC | Age: 77
End: 2017-09-13
Payer: MEDICARE

## 2017-09-13 VITALS
SYSTOLIC BLOOD PRESSURE: 116 MMHG | BODY MASS INDEX: 23.56 KG/M2 | DIASTOLIC BLOOD PRESSURE: 72 MMHG | TEMPERATURE: 98 F | HEIGHT: 64 IN | WEIGHT: 138 LBS

## 2017-09-13 DIAGNOSIS — L30.9 ECZEMA, UNSPECIFIED TYPE: Primary | ICD-10-CM

## 2017-09-13 DIAGNOSIS — J30.9 ALLERGIC RHINITIS, UNSPECIFIED CHRONICITY, UNSPECIFIED SEASONALITY, UNSPECIFIED TRIGGER: ICD-10-CM

## 2017-09-13 DIAGNOSIS — L50.1 URTICARIA, IDIOPATHIC: ICD-10-CM

## 2017-09-13 PROCEDURE — 1159F MED LIST DOCD IN RCRD: CPT | Mod: GC,,, | Performed by: STUDENT IN AN ORGANIZED HEALTH CARE EDUCATION/TRAINING PROGRAM

## 2017-09-13 PROCEDURE — 99214 OFFICE O/P EST MOD 30 MIN: CPT | Mod: S$PBB,GC,, | Performed by: STUDENT IN AN ORGANIZED HEALTH CARE EDUCATION/TRAINING PROGRAM

## 2017-09-13 PROCEDURE — 99999 PR PBB SHADOW E&M-EST. PATIENT-LVL III: CPT | Mod: PBBFAC,GC,, | Performed by: STUDENT IN AN ORGANIZED HEALTH CARE EDUCATION/TRAINING PROGRAM

## 2017-09-13 PROCEDURE — 99213 OFFICE O/P EST LOW 20 MIN: CPT | Mod: PBBFAC | Performed by: STUDENT IN AN ORGANIZED HEALTH CARE EDUCATION/TRAINING PROGRAM

## 2017-09-13 RX ORDER — LEVOCETIRIZINE DIHYDROCHLORIDE 5 MG/1
5 TABLET ORAL NIGHTLY
Qty: 120 TABLET | Refills: 6 | Status: SHIPPED | OUTPATIENT
Start: 2017-09-13 | End: 2017-10-25

## 2017-09-13 RX ORDER — LEVOCETIRIZINE DIHYDROCHLORIDE 5 MG/1
5 TABLET, FILM COATED ORAL NIGHTLY
Qty: 120 TABLET | Refills: 6 | Status: SHIPPED | OUTPATIENT
Start: 2017-09-13 | End: 2017-10-25 | Stop reason: SDUPTHER

## 2017-09-13 RX ORDER — CETIRIZINE HYDROCHLORIDE 10 MG/1
10 TABLET ORAL DAILY
Refills: 0 | Status: CANCELLED | COMMUNITY
Start: 2017-09-13 | End: 2018-09-13

## 2017-09-13 NOTE — PATIENT INSTRUCTIONS
-to take levocetirizine 5 mg 2 tablets twice daily or cetirizine 10mg 2 tablets twice daily And, can wean down the dose if her symptoms are improved.  -can follow up in one year.

## 2017-09-13 NOTE — PROGRESS NOTES
Progress Notes      Progress note: Follow up      Patient ID: Gabriela Edwards is a 76 y.o. female     CC: follow up for eczema with urticaria and angioedema     HPI: This is 77 yo woman with h/o dust mite allergy and eczema presented here for follow up.     Chronic urticaria and itching of skin: She developed itching of skin with urticaria more than a year. She is taking levo-cetirizine 5 mg 2tab am and 2 tabs pm. She stated that her symptoms of hives and itching tremendously improved.She denies having hives/itching skin over 8 weeks.However, she mentioned that it is hard to get prescription refilled for xyzal (levocetirizine).     Eczema: eczema is also improved. Only when she has active lesions she uses triamcinolone cream. She moisturizes her skin well. It is well-controlled.     Chronic rhinitis: she has rhinitis for years, she has skin tested and dust mite allergic when she was young. She had dust mite IT for 5 years from 16 yo to 19yo.Her symptoms are well-controlled with flonase nasal spray. And anti-histmaine as needed. She also has patch testing done at dermatology, was positive to nickel,cabamix, 4-bmklp2-mnbdnfzvtiqx-1,3 diol, imidazolidinyl urea.   Her rhinitis symptoms are controlled.      REVIEW OF SYSTEMS      CONST: no F/C/NS, +Anxiety, + hair loss,  NEURO: no H/A, no weakness, no paresthesias  EYES: no discharge, no pruritus, no erythema  EARS: no hearing loss, no sensation of fullness  NOSE: no congestion, no rhinorrhea, no discharge, no itching, no sneezing  PULM: no SOB, no wheezing, no cough, no snoring  CV: no CP, no palpitations, no leg swelling  GI: no dysphagia, no heartburn, no pain, no N/V/D, no BRBPR/melena  URO: no dysuria, no hematuria, no nocturia  MSK: no joint pain, no muscle pain  DERM: today, she has eczema erythematous skin lesions on her lower extremities, upper arms, neck, chest ,back and back of the ears.c/o itching       MEDICAL HISTORY      MedHx: active problems  reviewed  SurgHx: none  SocHx: ,. Lives alone , no pets. No dusts no molds. No smoke , no alcohol  FamHx: not significant of asthma, eczema ,immunodeficiency in the family.  Allergies: see below  Medications: MAR reviewed     H/o Asthma: denies  H/o Eczema: denies  Oral Allergy:  denies  Food Allergy: denies  Venom Allergy: denies  Latex Allergy: denies  Other Allergies: denies  Env/Occ: denies any evironmental or occupational exposures      PHYSICAL EXAM      VS: /72mmHg, T 36.4C  GENERAL: AAOx4, NAD, well-appearing, cooperative  EYES: PERRL, EOMI, no conjunctival injection, no discharge, no infraorbital shiners  EARS: external auditory canals normal B/L, TM normal B/L  NOSE: NT + and  B/L, stringing mucous, no polyps  ORAL: MMM, no ulcers, no thrush, no cobblestoning  NECK: supple, trachea midline, no thyromegaly, no LAD  LUNGS: CTAB, no w/r/c, no increased WOB  HEART: RRR, normal S1/S2, no m/g/r  ABDOMEN: BS present, soft, non-tender, non-distended, no HSM  EXTREMITIES: +2 distal pulses, no c/c/e  LYMPHATICS: no cervical/submandibular LAD, no axillary LAD, no inguinal LAD  DERM: skin is clear, no rashes/no breaks.  NEURO: normal gait, no facial asymmetry         CHART REVIEW      Reviewed.      ASSESSMENT & PLAN      Gabriela Edwards is a 76 y.o. female with      1.Severe eczema: controlled. No flare up for months.             -To apply triamcinolone 0.1% ointment twice daily and tacrolimus 0.1% ointment daily as directed when flare up eczema.            -To moisturize the skin well, applied twice daily            -To follow up with dermatology     2. Chornic idiopathic spontaneous urticaria. Responding to levocetirizine current regime as directed. Work up thyroid function panel, cell counts, SPEP are normal.            -to take levocetirizine 5 mg tablets 4times daily with pepcid 150mg twice daily and can wean down when symptoms improve.           -Suggest patient that she can get cetirizine 10  mg OTC (from [facility] or [facility]) if it 's hard to get refill for prescription for xyzal. She can take cetirizine 10mg 2 tablets  Up to twice daily for uriticaria.           3.Chronic rhinitis: well-controlled            -Continue the current treatment. flonase nasal spray 1 SEN once to twice daily as needed.     To follow up in 1 year.    Daly Potter M.D  Allergy/Immunology Fellow

## 2017-10-17 ENCOUNTER — TELEPHONE (OUTPATIENT)
Dept: ALLERGY | Facility: CLINIC | Age: 77
End: 2017-10-17

## 2017-10-17 NOTE — TELEPHONE ENCOUNTER
"Dr. Potter, I am having some issues with Ms. Edwards's Xyzal prescription.  Your original order for her Xyzal on 9/13/17 was for her to  Take 2 tablets 2 times per day.  When you gave her the script you only put one tablet in the evening.  Therefore the pharmacy is questioning and will not fill the script,.  She came here on 10/3/17 to try to resolve the issue.  You were not here, so based on your instructions I called the pharmacy with the correct instructions per your note.   I was then going to have you print a new script per the patients request.( she prefers to keep a hard copy).   She left here went to the pharmacy and was again denied the script based on the note I received from her today.  She states the person "Jenni" that I spoke with that day denied speaking with me.  She still does not have this resolved and she is very frustrated.  Can you please send this script to her pharmacy today.  Also she would like to have a copy of the script emailed to her.  I would appreciate your help with this today as the patient is in need of the medication and feels like we are not taking care of her.  "

## 2017-10-25 DIAGNOSIS — J30.9 ALLERGIC RHINITIS, UNSPECIFIED CHRONICITY, UNSPECIFIED SEASONALITY, UNSPECIFIED TRIGGER: ICD-10-CM

## 2017-10-25 RX ORDER — LEVOCETIRIZINE DIHYDROCHLORIDE 5 MG/1
TABLET, FILM COATED ORAL
Qty: 120 TABLET | Refills: 6 | Status: SHIPPED | OUTPATIENT
Start: 2017-10-25 | End: 2017-11-30 | Stop reason: SDUPTHER

## 2017-10-30 NOTE — TELEPHONE ENCOUNTER
Dr. Potter,  Have you had a chance to speak with MsDeep Grace.  If not can you do this for me this week.  Thank you so much.

## 2017-11-06 ENCOUNTER — TELEPHONE (OUTPATIENT)
Dept: ALLERGY | Facility: CLINIC | Age: 77
End: 2017-11-06

## 2017-11-06 NOTE — TELEPHONE ENCOUNTER
----- Message from Daly Potter MD sent at 11/4/2017 12:57 PM CDT -----  Hi,   I talked with the patient last week on 10/25/17. And the prescription issue was fixed. Do you receive any call from the patient after that day? I will call her back.   Thank you for your help.

## 2017-11-30 ENCOUNTER — OFFICE VISIT (OUTPATIENT)
Dept: ALLERGY | Facility: CLINIC | Age: 77
End: 2017-11-30
Payer: MEDICARE

## 2017-11-30 VITALS
TEMPERATURE: 98 F | SYSTOLIC BLOOD PRESSURE: 138 MMHG | WEIGHT: 138 LBS | HEIGHT: 64 IN | BODY MASS INDEX: 23.56 KG/M2 | DIASTOLIC BLOOD PRESSURE: 70 MMHG

## 2017-11-30 DIAGNOSIS — L50.8 URTICARIA, CHRONIC: ICD-10-CM

## 2017-11-30 DIAGNOSIS — J30.89 ALLERGIC RHINITIS DUE TO DUST MITE: ICD-10-CM

## 2017-11-30 DIAGNOSIS — L50.8 URTICARIA, ACUTE: Primary | ICD-10-CM

## 2017-11-30 PROCEDURE — 99999 PR PBB SHADOW E&M-EST. PATIENT-LVL III: CPT | Mod: PBBFAC,,, | Performed by: STUDENT IN AN ORGANIZED HEALTH CARE EDUCATION/TRAINING PROGRAM

## 2017-11-30 PROCEDURE — 99213 OFFICE O/P EST LOW 20 MIN: CPT | Mod: PBBFAC | Performed by: STUDENT IN AN ORGANIZED HEALTH CARE EDUCATION/TRAINING PROGRAM

## 2017-11-30 PROCEDURE — 99213 OFFICE O/P EST LOW 20 MIN: CPT | Mod: S$PBB,,, | Performed by: STUDENT IN AN ORGANIZED HEALTH CARE EDUCATION/TRAINING PROGRAM

## 2017-11-30 RX ORDER — LEVOCETIRIZINE DIHYDROCHLORIDE 5 MG/1
TABLET, FILM COATED ORAL
Qty: 180 TABLET | Refills: 6 | Status: SHIPPED | OUTPATIENT
Start: 2017-11-30 | End: 2017-11-30 | Stop reason: SDUPTHER

## 2017-11-30 RX ORDER — LEVOCETIRIZINE DIHYDROCHLORIDE 5 MG/1
TABLET, FILM COATED ORAL
Qty: 180 TABLET | Refills: 6 | Status: SHIPPED | OUTPATIENT
Start: 2017-11-30 | End: 2018-06-14 | Stop reason: SDUPTHER

## 2017-11-30 RX ORDER — PREDNISONE 10 MG/1
TABLET ORAL
Qty: 21 TABLET | Refills: 0 | Status: SHIPPED | OUTPATIENT
Start: 2017-11-30 | End: 2019-03-11

## 2017-11-30 NOTE — PATIENT INSTRUCTIONS
Prednisone for 6 days, starting with 6 tablets a day.    Levocetirazine: 4-6 tablets a day    Triamcinalone:  Thin layer, then cover with moisturizer  twice a day for one week only.

## 2017-11-30 NOTE — PROGRESS NOTES
Allergy Clinic Note  [facility]    Subjective:      Patient ID: Gabriela Edwards is a 77 y.o. female.    Chief Complaint: Follow-up (Itching,urticaria)      Referring Provider:      History of Present Illness: 78 yo  here for acute flare of chronic urticaria x 1 week, severe x 2 days.    Pt reports having been itch-free and hive-free since last summer when started on levocetirizine (5 mg) 2 tablets BID.  ! Week ago she began having welts on her neck, shoulders more than lower body.  Rash rate 10 out of 10 with itch rated 2/10.  She used TMC, Epsom salts, and took additional levocetirizine, which helped itching but not rash.    She denies any clear precipitant.  She travelled to her daughter's in [place] and her camp, both of which she does regularly.  On close, questioning she admits removing [employer] decorations for a seldom used attic storeroom.    Pt states that when urticaria was at it's most severe, she was given steroid shots which caused her systolic BP to increase to 180 mmHg.    No other complaints    Additional History:  Pt has seizure disorder Rx Keppra.  No changes.   Patient Active Problem List   Diagnosis    DON (conjunctival intraepithelial neoplasia)    Allergic reaction    Urticaria, idiopathic    Allergic rhinitis    Eczema       Review of Systems   Constitutional: Negative for chills, fever and malaise/fatigue.   HENT: Negative for ear discharge.    Eyes: Negative for pain and discharge.   Respiratory: Negative for hemoptysis, shortness of breath, wheezing and stridor.    Cardiovascular: Negative for chest pain and palpitations.   Gastrointestinal: Negative for abdominal pain, diarrhea, nausea and vomiting.   Skin: Positive for itching and rash.   Neurological: Negative for seizures and loss of consciousness.       Objective:     Vitals:    11/30/17 1318   BP: 138/70   Temp: 98 °F (36.7 °C)       Physical Exam   Constitutional: She is oriented to person, place, and time.    HENT:   Head: Normocephalic and atraumatic.   Right Ear: External ear normal.   Left Ear: External ear normal.   Nose: Nose normal.   Eyes: Conjunctivae are normal. No scleral icterus.   Neck: Neck supple.   Cardiovascular: Normal rate and regular rhythm.    Pulmonary/Chest: Effort normal. No stridor. No respiratory distress.   Abdominal: Soft. She exhibits no distension.   Musculoskeletal: She exhibits no edema.   Neurological: She is alert and oriented to person, place, and time.   Skin:   Neck and upper chest and post shoulders with classic, coalescing urticarial welts.  Bilateral arms diffusely red and warm with some bruising.  Abdomen and legs with scattered welts.  No lip or eyelid swelling   Psychiatric: Memory and judgment normal.   Scratching, uncomfortable-looking       Data: none        Assessment:     1. Urticaria, acute    2. Urticaria, chronic    3. Allergic rhinitis due to dust mite      Acute flare of acute urticaria, probably due to dust exposure in patient with Hx of chronic urticaria, usually controlled on high dose antihistamines.  Plan:     Gabriela was seen today for follow-up.    Diagnoses and all orders for this visit:    Urticaria, acute  --    Increase levocetirizine (XYZAL) 5 MG tablet; Take 5mg 2 tablets three times daily by mouth.    --     predniSONE (DELTASONE) 10 MG tablet; Take 6 tabs on day 1  Take 5 tabs on day 2  Take 4 tabs on day 3  Take 3 tabs on day 4  Take 2 tabs on day 5  Take 1 tab on day 6  Stop      Urticaria, chronic  When back to baseline, resume levoceirizine 2 BID    Allergic rhinitis due to dust mite  Continue current dust avoidance.  Avoid going into attic          Patient Instructions   Prednisone for 6 days, starting with 6 tablets a day.    Levocetirazine: 4-6 tablets a day    Triamcinalone:  Thin layer, then cover with moisturizer  twice a day for one week only.      Return if symptoms worsen or fail to improve, or, for regular apt in Septmenber 2018 as  planned.    Cristina Moreno MD

## 2017-12-18 ENCOUNTER — OFFICE VISIT (OUTPATIENT)
Dept: ALLERGY | Facility: CLINIC | Age: 77
End: 2017-12-18
Payer: MEDICARE

## 2017-12-18 VITALS
SYSTOLIC BLOOD PRESSURE: 108 MMHG | WEIGHT: 139.31 LBS | BODY MASS INDEX: 23.78 KG/M2 | HEIGHT: 64 IN | DIASTOLIC BLOOD PRESSURE: 68 MMHG

## 2017-12-18 DIAGNOSIS — L50.1 CHRONIC IDIOPATHIC URTICARIA: Primary | ICD-10-CM

## 2017-12-18 PROCEDURE — 99213 OFFICE O/P EST LOW 20 MIN: CPT | Mod: S$PBB,,, | Performed by: ALLERGY & IMMUNOLOGY

## 2017-12-18 PROCEDURE — 99999 PR PBB SHADOW E&M-EST. PATIENT-LVL III: CPT | Mod: PBBFAC,,, | Performed by: ALLERGY & IMMUNOLOGY

## 2017-12-18 PROCEDURE — 99213 OFFICE O/P EST LOW 20 MIN: CPT | Mod: PBBFAC | Performed by: ALLERGY & IMMUNOLOGY

## 2017-12-18 RX ORDER — PREDNISONE 10 MG/1
TABLET ORAL
Qty: 21 TABLET | Refills: 1 | Status: SHIPPED | OUTPATIENT
Start: 2017-12-18 | End: 2019-02-18 | Stop reason: SDUPTHER

## 2017-12-18 NOTE — PROGRESS NOTES
Subjective:      Patient ID: Gabriela Edwards is a 77 y.o. female.    Chief Complaint: Follow-up (urticaria)  fu chronic idiopathic urticaria      History of Present Illness:   Pt w hx chronic/recurrent idiopathic urticaria for approx 2+ years.   She saw Dr. Moreno on 11/30 for an exacerbation of urticaria requiring prednisone burst to control. She had good response to prednisone. Dust exposure in attic is possible trigger for flare.  Has since been doing well again w 10 mg levocetirizine twice daily.      Additional History:  Pt has seizure disorder Rx Keppra.  No changes.   Patient Active Problem List   Diagnosis    DON (conjunctival intraepithelial neoplasia)    Allergic reaction    Urticaria, idiopathic    Allergic rhinitis    Eczema       Review of Systems   Constitutional: Negative for chills, fever and malaise/fatigue.   HENT: Negative for ear discharge.    Eyes: Negative for pain and discharge.   Respiratory: Negative for hemoptysis, shortness of breath, wheezing and stridor.    Cardiovascular: Negative for chest pain and palpitations.   Gastrointestinal: Negative for abdominal pain, diarrhea, nausea and vomiting.   Skin: Negative for itching and rash.   Neurological: Negative for seizures and loss of consciousness.       Objective:     Vitals:    12/18/17 1351   BP: 108/68       Physical Exam   Constitutional: She is oriented to person, place, and time.   HENT:   Head: Normocephalic and atraumatic.   Right Ear: External ear normal.   Left Ear: External ear normal.   Nose: Nose normal.   Eyes: Conjunctivae are normal. No scleral icterus.   Neck: Neck supple.   Cardiovascular: Normal rate and regular rhythm.    Pulmonary/Chest: Effort normal. No stridor. No respiratory distress.   Abdominal: Soft. She exhibits no distension.   Musculoskeletal: She exhibits no edema.   Neurological: She is alert and oriented to person, place, and time.   Skin: No rash noted.   No urticaria noted   Psychiatric:  Memory and judgment normal.       Data:   Previous chronic urticaria lab w/u reviewed      Assessment:     1. Chronic idiopathic urticaria     S/p exacerbation, now doing well    Plan:     Gabriela was seen today for follow-up.    Diagnoses and all orders for this visit:    Chronic idiopathic urticaria  --    Increase levocetirizine (XYZAL) 5 MG tablet; Take 5mg 2 tablets 2 times daily by mouth.  --if poor control, can also add bid ranitidine 150 mg twice daily, or bid pepcid  --     Contingent prednisone rx given  -- discussed poss xolair if poor control w above  rtc 6 mo, sooner prn    Claude Kahn MD

## 2018-04-10 ENCOUNTER — TELEPHONE (OUTPATIENT)
Dept: ALLERGY | Facility: CLINIC | Age: 78
End: 2018-04-10

## 2018-04-10 RX ORDER — CETIRIZINE HYDROCHLORIDE 10 MG/1
10 TABLET ORAL 2 TIMES DAILY
Qty: 120 TABLET | Refills: 6 | Status: SHIPPED | OUTPATIENT
Start: 2018-04-10 | End: 2019-04-01

## 2018-04-10 NOTE — TELEPHONE ENCOUNTER
Patient stated that the pharmacy stated that Xyzal is on back order and she needs to take 4 a day. Can we send in Zyrtec instead?

## 2018-04-10 NOTE — TELEPHONE ENCOUNTER
----- Message from Radha Houser MA sent at 4/10/2018 10:32 AM CDT -----  Contact: Self/[phone]  Pt stated that she would like to speak with someone regarding her rx for zyrtec. She c/o hives on her neck. She would like to know is she should go to the ER. Please advise.

## 2018-06-14 DIAGNOSIS — L50.8 URTICARIA, ACUTE: ICD-10-CM

## 2018-06-14 RX ORDER — LEVOCETIRIZINE DIHYDROCHLORIDE 5 MG/1
TABLET, FILM COATED ORAL
Qty: 120 TABLET | Refills: 11 | Status: SHIPPED | OUTPATIENT
Start: 2018-06-14 | End: 2019-06-29 | Stop reason: SDUPTHER

## 2018-06-14 NOTE — TELEPHONE ENCOUNTER
pls let Ms. Edwards know that I just received her letter today and I sent in a refill for levocetirizine, 2 tabs twice daily for chronic urticaria, to the [facility]'s at [address].  Pls let us know if not received or if other problems, concerns

## 2019-02-18 ENCOUNTER — TELEPHONE (OUTPATIENT)
Dept: ALLERGY | Facility: CLINIC | Age: 79
End: 2019-02-18

## 2019-02-18 RX ORDER — PREDNISONE 10 MG/1
TABLET ORAL
Qty: 21 TABLET | Refills: 1 | Status: SHIPPED | OUTPATIENT
Start: 2019-02-18 | End: 2019-03-11

## 2019-02-18 NOTE — TELEPHONE ENCOUNTER
----- Message from Marianne Delgado sent at 2/18/2019  9:44 AM CST -----  Contact: [phone]  Patient is requesting a call from the office. Patient stated she still has hives on her neck and face and she's been taking 5 pills a day for 5 days of medication levocetirizine (XYZAL) 5 MG tablet. Patient stated if she can get something else prescribed or does she need to come in.   Please advise, thanks

## 2019-03-11 ENCOUNTER — OFFICE VISIT (OUTPATIENT)
Dept: ALLERGY | Facility: CLINIC | Age: 79
End: 2019-03-11
Payer: MEDICARE

## 2019-03-11 VITALS
HEIGHT: 64 IN | DIASTOLIC BLOOD PRESSURE: 80 MMHG | WEIGHT: 138.44 LBS | SYSTOLIC BLOOD PRESSURE: 124 MMHG | BODY MASS INDEX: 23.64 KG/M2

## 2019-03-11 DIAGNOSIS — L50.1 URTICARIA, IDIOPATHIC: Primary | ICD-10-CM

## 2019-03-11 PROCEDURE — 99999 PR PBB SHADOW E&M-EST. PATIENT-LVL III: ICD-10-PCS | Mod: PBBFAC,,, | Performed by: ALLERGY & IMMUNOLOGY

## 2019-03-11 PROCEDURE — 99214 OFFICE O/P EST MOD 30 MIN: CPT | Mod: S$PBB,,, | Performed by: ALLERGY & IMMUNOLOGY

## 2019-03-11 PROCEDURE — 99999 PR PBB SHADOW E&M-EST. PATIENT-LVL III: CPT | Mod: PBBFAC,,, | Performed by: ALLERGY & IMMUNOLOGY

## 2019-03-11 PROCEDURE — 99213 OFFICE O/P EST LOW 20 MIN: CPT | Mod: PBBFAC | Performed by: ALLERGY & IMMUNOLOGY

## 2019-03-11 PROCEDURE — 99214 PR OFFICE/OUTPT VISIT, EST, LEVL IV, 30-39 MIN: ICD-10-PCS | Mod: S$PBB,,, | Performed by: ALLERGY & IMMUNOLOGY

## 2019-03-11 RX ORDER — PREDNISONE 10 MG/1
TABLET ORAL
Qty: 33 TABLET | Refills: 1 | Status: SHIPPED | OUTPATIENT
Start: 2019-03-11 | End: 2019-04-01

## 2019-03-11 RX ORDER — PIMECROLIMUS 10 MG/G
CREAM TOPICAL 2 TIMES DAILY
Qty: 100 G | Refills: 3 | Status: SHIPPED | OUTPATIENT
Start: 2019-03-11 | End: 2019-04-01

## 2019-03-11 RX ORDER — TRIAMCINOLONE ACETONIDE 1 MG/G
CREAM TOPICAL 2 TIMES DAILY
Qty: 454 G | Refills: 3 | Status: SHIPPED | OUTPATIENT
Start: 2019-03-11 | End: 2019-08-12

## 2019-03-11 NOTE — PROGRESS NOTES
Subjective:      Patient ID: Gabriela Edwards is a 78 y.o. female.     12/18/17    Chief Complaint: Urticaria  fu chronic idiopathic urticaria      History of Present Illness:   Pt w hx chronic/recurrent idiopathic urticaria for approx 2-3+ years.   Over last year had been well controlled w levocetirizine 10 mg twice daily.  Over last 3-4 weeks has had recurrence of urticaria exacerbations, even on levocetirizine. Has had 2 courses prednisone during this time, and currently w another exacerbation.    Additional History:  Pt has seizure disorder Rx Keppra.  No changes.   Patient Active Problem List   Diagnosis    DON (conjunctival intraepithelial neoplasia)    Allergic reaction    Urticaria, idiopathic    Allergic rhinitis    Eczema       Review of Systems   Constitutional: Negative for chills, fever and malaise/fatigue.   HENT: Negative for ear discharge.    Eyes: Negative for pain and discharge.   Respiratory: Negative for hemoptysis, shortness of breath, wheezing and stridor.    Cardiovascular: Negative for chest pain and palpitations.   Gastrointestinal: Negative for abdominal pain, diarrhea, nausea and vomiting.   Skin: Positive for itching. Negative for rash.        urticaria   Neurological: Negative for seizures and loss of consciousness.   Psychiatric/Behavioral: The patient is not nervous/anxious.        Objective:     Vitals:    03/11/19 1555   BP: 124/80       Physical Exam   Constitutional: She is oriented to person, place, and time.   HENT:   Head: Normocephalic and atraumatic.   Right Ear: External ear normal.   Left Ear: External ear normal.   Nose: Nose normal.   Eyes: Conjunctivae are normal. No scleral icterus.   Neck: Neck supple.   Cardiovascular: Normal rate and regular rhythm.   Pulmonary/Chest: Effort normal. No stridor. No respiratory distress.   Abdominal: Soft. She exhibits no distension.   Musculoskeletal: She exhibits no edema.   Neurological: She is alert and oriented to  person, place, and time.   Skin: No rash noted.   Urticaria on face, neck, chest   Psychiatric: Memory and judgment normal.       Data:   Previous chronic urticaria lab       Assessment:     1. Urticaria, idiopathic     Chronic, w exacerbation    Plan:     Gabriela was seen today for follow-up.    Diagnoses and all orders for this visit:    Chronic idiopathic urticaria  predinsone taper over 2 weeks  elidel to face  tcn prn  xyzal 10 mg twice daily  Fu 2-3 weeks    Claude Kahn MD

## 2019-03-20 ENCOUNTER — PATIENT MESSAGE (OUTPATIENT)
Dept: ALLERGY | Facility: CLINIC | Age: 79
End: 2019-03-20

## 2019-03-20 ENCOUNTER — TELEPHONE (OUTPATIENT)
Dept: ALLERGY | Facility: CLINIC | Age: 79
End: 2019-03-20

## 2019-03-20 RX ORDER — PREDNISONE 20 MG/1
40 TABLET ORAL DAILY
Qty: 14 TABLET | Refills: 1 | Status: SHIPPED | OUTPATIENT
Start: 2019-03-20 | End: 2019-03-27

## 2019-03-20 NOTE — TELEPHONE ENCOUNTER
----- Message from Mary Green MA sent at 3/20/2019 11:00 AM CDT -----  Contact: Gabriela [phone]  Patient is requesting a call back in regards to her hives.    Patient stated that Dr. Kahn had prescribed predniSONE for her hives but the hives have come back while on the medication.    Please call and advise.    Thanks   Mary

## 2019-04-01 ENCOUNTER — TELEPHONE (OUTPATIENT)
Dept: ALLERGY | Facility: CLINIC | Age: 79
End: 2019-04-01

## 2019-04-01 ENCOUNTER — OFFICE VISIT (OUTPATIENT)
Dept: ALLERGY | Facility: CLINIC | Age: 79
End: 2019-04-01
Payer: MEDICARE

## 2019-04-01 VITALS
DIASTOLIC BLOOD PRESSURE: 74 MMHG | SYSTOLIC BLOOD PRESSURE: 120 MMHG | HEIGHT: 64 IN | BODY MASS INDEX: 22.89 KG/M2 | WEIGHT: 134.06 LBS

## 2019-04-01 DIAGNOSIS — L50.8 CHRONIC URTICARIA: Primary | ICD-10-CM

## 2019-04-01 PROCEDURE — 99999 PR PBB SHADOW E&M-EST. PATIENT-LVL III: CPT | Mod: PBBFAC,,, | Performed by: ALLERGY & IMMUNOLOGY

## 2019-04-01 PROCEDURE — 99999 PR PBB SHADOW E&M-EST. PATIENT-LVL III: ICD-10-PCS | Mod: PBBFAC,,, | Performed by: ALLERGY & IMMUNOLOGY

## 2019-04-01 PROCEDURE — 99213 OFFICE O/P EST LOW 20 MIN: CPT | Mod: PBBFAC | Performed by: ALLERGY & IMMUNOLOGY

## 2019-04-01 PROCEDURE — 99214 OFFICE O/P EST MOD 30 MIN: CPT | Mod: S$PBB,,, | Performed by: ALLERGY & IMMUNOLOGY

## 2019-04-01 PROCEDURE — 99214 PR OFFICE/OUTPT VISIT, EST, LEVL IV, 30-39 MIN: ICD-10-PCS | Mod: S$PBB,,, | Performed by: ALLERGY & IMMUNOLOGY

## 2019-04-01 RX ORDER — PREDNISONE 10 MG/1
TABLET ORAL
Qty: 60 TABLET | Refills: 2 | Status: SHIPPED | OUTPATIENT
Start: 2019-04-01 | End: 2019-08-27 | Stop reason: SDUPTHER

## 2019-04-01 NOTE — TELEPHONE ENCOUNTER
Negar,    This patient will be starting Xolair for Chronic urticaria.  Patient would like to receive injections in [place] once approved.    Thanks    Nevaeh

## 2019-04-01 NOTE — PROGRESS NOTES
Subjective:      Patient ID: Gabriela Edwards is a 78 y.o. female.    LV 12/18/17    Chief Complaint: Follow-up (urticaria)  fu chronic idiopathic urticaria      History of Present Illness:   Pt w hx chronic/recurrent idiopathic urticaria for approx 2-3+ years.   Since LV still needing freq prednisone to control urticaria. Continues 20 mg levocetirizine twice daily. Having recurring exacerbations of urticaria when stops prednisone. Currently w decent control no 20 mg prednisone daily.      Additional History:  Pt has seizure disorder Rx Keppra.  No changes.   Patient Active Problem List   Diagnosis    DON (conjunctival intraepithelial neoplasia)    Allergic reaction    Urticaria, idiopathic    Allergic rhinitis    Eczema       Review of Systems   Constitutional: Negative for chills, fever and malaise/fatigue.   HENT: Negative for ear discharge.    Eyes: Negative for pain and discharge.   Respiratory: Negative for hemoptysis, shortness of breath, wheezing and stridor.    Cardiovascular: Negative for chest pain and palpitations.   Gastrointestinal: Negative for abdominal pain, diarrhea, nausea and vomiting.   Skin: Positive for itching. Negative for rash.        urticaria   Neurological: Negative for seizures and loss of consciousness.   Psychiatric/Behavioral: The patient is not nervous/anxious.        Objective:     Vitals:    04/01/19 1413   BP: 120/74       Physical Exam   Constitutional: She is oriented to person, place, and time.   HENT:   Head: Normocephalic and atraumatic.   Right Ear: External ear normal.   Left Ear: External ear normal.   Nose: Nose normal.   Eyes: Conjunctivae are normal. No scleral icterus.   Neck: Neck supple.   Cardiovascular: Normal rate and regular rhythm.   Pulmonary/Chest: Effort normal. No stridor. No respiratory distress.   Abdominal: Soft. She exhibits no distension.   Musculoskeletal: She exhibits no edema.   Neurological: She is alert and oriented to person, place,  and time.   Skin: No rash noted.   Psychiatric: Memory and judgment normal.             Assessment:     1. Chronic urticaria     Poor control w high dose h1 antihistamines    Plan:     Start omalizumab 300 mg sq q 28 days  Continue levocetirizine 20 mg twice daily  Prn 10-20 mg prednisone for exacerbations    Would like to receive omalizumab at [facility]    Claude Kahn MD

## 2019-04-01 NOTE — Clinical Note
Negar Ms. Grace is a new xolair pt for chronic urticaria. She'd like to get xolair in [place]. I sent rx for syringes to [facility].LM

## 2019-04-03 ENCOUNTER — TELEPHONE (OUTPATIENT)
Dept: PHARMACY | Facility: CLINIC | Age: 79
End: 2019-04-03

## 2019-04-04 NOTE — TELEPHONE ENCOUNTER
DOCUMENTATION ONLY:  Prior Authorization for Xolair approved from 04/04/2019 to 10/03/2019    Case Id: [id number]    Co-pay: $80    Patient Assistance IS  required  [facility]        DOCUMENTATION ONLY  Faxed requested additional information regarding Xolair PA request to insurance GIOVANNI 04/05 10:04pm        DOCUMENTATION ONLY  Submitted prior authorization request for Xolair to insurance on 04/04 6:47pm. GIOVANNI

## 2019-04-11 ENCOUNTER — TELEPHONE (OUTPATIENT)
Dept: PHARMACY | Facility: CLINIC | Age: 79
End: 2019-04-11

## 2019-04-11 ENCOUNTER — TELEPHONE (OUTPATIENT)
Dept: ALLERGY | Facility: CLINIC | Age: 79
End: 2019-04-11

## 2019-04-11 NOTE — TELEPHONE ENCOUNTER
----- Message from Patito Coreas PharmD sent at 4/11/2019  8:57 AM CDT -----  Regarding: XOLAIR  Good Morning,    Xolair initial consult was completed today 4/11/19.  Please contact patient to make an appointment for her 1st injection. Also, patient DOES NOT have an Epi Pen.  Once her appt is booked please let us know where to send the Xolair.       THANK YOU,      Patito Coreas, PharmD    Specialty Pharmacy Clinical Pharmacist  [facility]  P: [phone]

## 2019-04-11 NOTE — TELEPHONE ENCOUNTER
Xolair phone consultation completed on appointment pending. Xolair injection will be delivered to injection location on TBD. Copay $ 80. Patient will receive injection on TBD. Confirmed 2 patient identifiers - name and . Therapy Appropriate.    Delivery address:[facility]    Consultation included:  Indication:  Chronic Idiopathic Urticaria  Goals of Treatment: Improving or maintaining quality of life     Counseled patient on administration directions:  Inject Xolair 150 mg  into the skin every 4 weeks. To be administered in healthcare setting.  ?  Patient was counseled on possible side effects:  - Injection site reaction: redness, soreness, itching, bruising, burning, inflammation which should resolve within 3-5 days.   -Joint pain, fatigue, dizziness, cold symptoms.  -Consulted on major signs and symptoms of anaphylaxis- rash, hives, wheezing, breathing problems, low blood pressure, rapid or weak heartbeat, anxiety, swelling of throat or tongue. Informed to seek immediate medical care for life threatening symptoms. Pt verbalized understanding.Pt does not have EpiPen will inbasket MDO  · Xolair is not for asthma attacks and confirms understanding to use albuterol inhaler as needed. Medication may take a few months to see full effects.    DDI: medication list reviewed; no significant DDIs with Xolair encountered.  · Consulted not to discontinue steroids abruptly upon Xolair initiation - patient not on steroids    Patient verbalized understanding. Reviewed the importance of compliance and keeping all follow up appointments. Patient denied any further questions. [facility] will f/u 1 week after injection to see how the patient is doing and [facility] will reach out to set up next refill in 3 weeks. Patient advised to call [facility] should any questions arise.    inBasket sent to make appt and to get patient Epi Pen

## 2019-04-11 NOTE — TELEPHONE ENCOUNTER
Called pt's home phone, no answer, no answering machine, called cell phone, no answer, left msg on vm.  Relayed that her xolair was approved through her Pharmacy benefit and I was calling to schedule an appt in [place].  Advised her to call us and schedule a 1st Inj Xolair appt at [facility] for either Wed or Friday of next week.  Mentioned that I don't have the med yet, so it will have to be later in the week, next week.

## 2019-04-13 ENCOUNTER — PATIENT MESSAGE (OUTPATIENT)
Dept: ALLERGY | Facility: CLINIC | Age: 79
End: 2019-04-13

## 2019-04-30 ENCOUNTER — PATIENT MESSAGE (OUTPATIENT)
Dept: ALLERGY | Facility: CLINIC | Age: 79
End: 2019-04-30

## 2019-05-01 ENCOUNTER — PATIENT MESSAGE (OUTPATIENT)
Dept: ALLERGY | Facility: CLINIC | Age: 79
End: 2019-05-01

## 2019-05-01 ENCOUNTER — CLINICAL SUPPORT (OUTPATIENT)
Dept: INTERNAL MEDICINE | Facility: CLINIC | Age: 79
End: 2019-05-01
Payer: MEDICARE

## 2019-05-01 DIAGNOSIS — L50.1 CHRONIC IDIOPATHIC URTICARIA: Primary | ICD-10-CM

## 2019-05-01 PROCEDURE — 99211 OFF/OP EST MAY X REQ PHY/QHP: CPT | Mod: PBBFAC,PO,25

## 2019-05-01 PROCEDURE — 99499 UNLISTED E&M SERVICE: CPT | Mod: S$PBB,,, | Performed by: ALLERGY & IMMUNOLOGY

## 2019-05-01 PROCEDURE — 99999 PR PBB SHADOW E&M-EST. PATIENT-LVL I: CPT | Mod: PBBFAC,,,

## 2019-05-01 PROCEDURE — 99499 NO LOS: ICD-10-PCS | Mod: S$PBB,,, | Performed by: ALLERGY & IMMUNOLOGY

## 2019-05-01 PROCEDURE — 96372 THER/PROPH/DIAG INJ SC/IM: CPT | Mod: PBBFAC,PO

## 2019-05-01 PROCEDURE — 99999 PR PBB SHADOW E&M-EST. PATIENT-LVL I: ICD-10-PCS | Mod: PBBFAC,,,

## 2019-05-01 NOTE — PROGRESS NOTES
See MAR for administration.  300mg total    Went to check pt after her 2 hr waiting period was up, and pt was not in the Lobby.  The receptionists told me she had left the Lobby about 10 minutes prior.  I specifically told the pt that she had to let me check her injection sites prior to her leaving.    No reaction can be noted.

## 2019-05-02 ENCOUNTER — PATIENT MESSAGE (OUTPATIENT)
Dept: ALLERGY | Facility: CLINIC | Age: 79
End: 2019-05-02

## 2019-05-14 ENCOUNTER — OFFICE VISIT (OUTPATIENT)
Dept: OPHTHALMOLOGY | Facility: CLINIC | Age: 79
End: 2019-05-14
Attending: OPHTHALMOLOGY
Payer: MEDICARE

## 2019-05-14 ENCOUNTER — PATIENT MESSAGE (OUTPATIENT)
Dept: ALLERGY | Facility: CLINIC | Age: 79
End: 2019-05-14

## 2019-05-14 DIAGNOSIS — H25.13 NUCLEAR SCLEROSIS, BILATERAL: Primary | ICD-10-CM

## 2019-05-14 PROCEDURE — 99999 PR PBB SHADOW E&M-EST. PATIENT-LVL II: ICD-10-PCS | Mod: PBBFAC,,, | Performed by: OPHTHALMOLOGY

## 2019-05-14 PROCEDURE — 92014 PR EYE EXAM, EST PATIENT,COMPREHESV: ICD-10-PCS | Mod: S$PBB,,, | Performed by: OPHTHALMOLOGY

## 2019-05-14 PROCEDURE — 92014 COMPRE OPH EXAM EST PT 1/>: CPT | Mod: S$PBB,,, | Performed by: OPHTHALMOLOGY

## 2019-05-14 PROCEDURE — 99212 OFFICE O/P EST SF 10 MIN: CPT | Mod: PBBFAC | Performed by: OPHTHALMOLOGY

## 2019-05-14 PROCEDURE — 99999 PR PBB SHADOW E&M-EST. PATIENT-LVL II: CPT | Mod: PBBFAC,,, | Performed by: OPHTHALMOLOGY

## 2019-05-14 RX ORDER — PHENYLEPHRINE HYDROCHLORIDE 25 MG/ML
1 SOLUTION/ DROPS OPHTHALMIC
Status: CANCELLED | OUTPATIENT
Start: 2019-05-14

## 2019-05-14 RX ORDER — TROPICAMIDE 10 MG/ML
1 SOLUTION/ DROPS OPHTHALMIC
Status: CANCELLED | OUTPATIENT
Start: 2019-05-14

## 2019-05-14 RX ORDER — MOXIFLOXACIN 5 MG/ML
1 SOLUTION/ DROPS OPHTHALMIC
Status: CANCELLED | OUTPATIENT
Start: 2019-05-14

## 2019-05-14 RX ORDER — TETRACAINE HYDROCHLORIDE 5 MG/ML
1 SOLUTION OPHTHALMIC
Status: CANCELLED | OUTPATIENT
Start: 2019-05-14

## 2019-05-14 NOTE — PROGRESS NOTES
HPI     S/p DON Excision OD with Cryo OD 10/15/2015    Pt states that here for fu visit not having any problems with eyes. No   pain or discomfort OU, no flashes or floaters.  Think may need cataract   surgery, having trouble seeing at a distance with glasses and contacts.    Could not read the words at the opera that was flashed above.       Eye Med(s) - Refresh AT's prn OU     Last edited by Katie Irvin MA on 5/14/2019 10:31 AM. (History)            Assessment /Plan     For exam results, see Encounter Report.    Nuclear sclerosis, bilateral      Visually Significant Cataract: Patient reports decreased vision consistent with the clinical amount of lenticular opacity, which reaches the level of visual significance and affects activities of daily living. Risks, benefits, and alternatives to cataract surgery were discussed and the consent reviewed. IOL options were discussed, including ATIOLs and the associated side effects and additional patient cost associated with them.   IOL Selections:   Right eye  IOL: BAI909 vs 225    24.0 at 007     Left eye  IOL: USX367 23.0  at 15 (vs pcboo)    Pt wishes to have right eye done first.  The patient expresses a desire to reduce spectacle dependence. I reviewed various IOL and LASER refractive surgical options and we will attempt to minimize spectacle dependence by managing astigmatism and optimizing IOL selection. Femtosecond LASER assisted cataract surgery (FLACS) technology was explained to the patient with educational videos and discussion.  The patient voices understanding and wishes to implement this technology during the cataract procedure.  I explained the increased precision of the LASER versus manual techniques, especially as it relates to astigmatism reduction with arcuate incisions.  I emphasized that although our goal is to reduce the need for refractive correction after surgery, there may still be a need for spectacle correction to achieve optimal visual  acuity, and that a reasonable range of functional vision should be the expectation.  No guarantees are made about post operative refraction or visual acuity, as the eye may heal in unpredictable ways, and the standard risks, benefits, and alternatives to cataract surgery were explained.  The patient understands that the refractive portions of this cataract procedure are not covered by insurance, and that there is an out of pocket expense of $1500 per eye. I also explained that even though our pre-operative plan is to utilize advanced refractive technologies during surgery, that I may decide to eliminate part or all of this plan if surgical challenges or complications arise, or I feel that it is not in the patient's best interest. Consent forms and an ABN form were given to the patient to review.    Catalys Parameters:  Right Eye:   KATIUSKA:  12mm   ?Need to pratik patient sitting up?: n  Capsulotomy: Scanned Capsule   rdGrdrrdarddrderd:rd rd3rd Arcuate:  TORIC PRATIK at 10  Incisions: OFF  Left Eye:   KATIUSKA:  12mm   ?Need to pratik patient sitting up?: n  Capsulotomy: Scanned Capsule  rdGrdrrdarddrderd:rd rd3rd Arcuate:  Anterior Penetrating:  Symmetric.  Length: 25  at  Axis: 20  Incisions:  OFF

## 2019-05-22 ENCOUNTER — TELEPHONE (OUTPATIENT)
Dept: OPHTHALMOLOGY | Facility: CLINIC | Age: 79
End: 2019-05-22

## 2019-05-22 RX ORDER — PREDNISOLONE ACETATE-GATIFLOXACIN-BROMFENAC .75; 5; 1 MG/ML; MG/ML; MG/ML
1 SUSPENSION/ DROPS OPHTHALMIC 3 TIMES DAILY
Qty: 7 ML | Refills: 3 | Status: SHIPPED | OUTPATIENT
Start: 2019-05-22 | End: 2019-05-29

## 2019-05-22 NOTE — TELEPHONE ENCOUNTER
----- Message from Tawny Joseph sent at 5/22/2019 10:13 AM CDT -----  Contact: Gabriela Edwards       ----- Message -----  From: Susi Cooley  Sent: 5/22/2019  10:08 AM  To: Pete DE LEON Staff    Pt would like to speak with  nurse to scheduled the eye surgery please,pt can be reached at [phone] please thank you.

## 2019-05-23 ENCOUNTER — TELEPHONE (OUTPATIENT)
Dept: OPHTHALMOLOGY | Facility: CLINIC | Age: 79
End: 2019-05-23

## 2019-05-23 NOTE — TELEPHONE ENCOUNTER
----- Message from Maite Reardon sent at 5/23/2019  9:56 AM CDT -----  Contact: Gabriela  Needs Advice    Reason for call: pt was calling to schedule her surgery.         Communication Preference: [phone]     Additional Information:

## 2019-06-01 ENCOUNTER — PATIENT MESSAGE (OUTPATIENT)
Dept: ALLERGY | Facility: CLINIC | Age: 79
End: 2019-06-01

## 2019-06-03 ENCOUNTER — CLINICAL SUPPORT (OUTPATIENT)
Dept: INTERNAL MEDICINE | Facility: CLINIC | Age: 79
End: 2019-06-03
Payer: MEDICARE

## 2019-06-03 DIAGNOSIS — L50.1 URTICARIA, IDIOPATHIC: ICD-10-CM

## 2019-06-03 PROCEDURE — 99499 UNLISTED E&M SERVICE: CPT | Mod: S$PBB,,, | Performed by: ALLERGY & IMMUNOLOGY

## 2019-06-03 PROCEDURE — 96372 THER/PROPH/DIAG INJ SC/IM: CPT | Mod: PBBFAC,PO

## 2019-06-03 PROCEDURE — 99499 NO LOS: ICD-10-PCS | Mod: S$PBB,,, | Performed by: ALLERGY & IMMUNOLOGY

## 2019-06-03 RX ORDER — PREDNISOLONE ACETATE-GATIFLOXACIN-BROMFENAC .75; 5; 1 MG/ML; MG/ML; MG/ML
1 SUSPENSION/ DROPS OPHTHALMIC 3 TIMES DAILY
Qty: 7 ML | Refills: 3 | Status: SHIPPED | OUTPATIENT
Start: 2019-08-17 | End: 2020-08-06

## 2019-06-03 RX ORDER — PREDNISOLONE ACETATE-GATIFLOXACIN-BROMFENAC .75; 5; 1 MG/ML; MG/ML; MG/ML
1 SUSPENSION/ DROPS OPHTHALMIC 3 TIMES DAILY
Qty: 7 ML | Refills: 3 | Status: SHIPPED | OUTPATIENT
Start: 2019-08-17 | End: 2019-06-03 | Stop reason: SDUPTHER

## 2019-06-03 NOTE — TELEPHONE ENCOUNTER
----- Message from Manju Kurtz sent at 6/3/2019 10:33 AM CDT -----  Contact: Cookie  Please resend script to [facility]  ----- Message -----  From: Tatyana Rogers  Sent: 6/3/2019  10:09 AM  To: Pete DE LEON Staff    Rx Refill/Request SENT TO COOKIE IN ERROR     Is this a Refill or New Rx:  New   Rx Name and Strength:  prednisolon/gatiflox/bromfenac (PREDNISOL ACE-GATIFLOX-BROMFEN) 1-0.5-0.075 % DrpS  Preferred Pharmacy with phone number: [facility] [place] - [place], [place] - [address]  [address] [facility] [address]  Phone: [phone] Fax: [phone]  Communication Preference:  Additional Information:

## 2019-06-03 NOTE — PROGRESS NOTES
See MAR for dosing administration.  Pt had breakout of hives this past week.  She has photos on line.  No other symptoms noted.  Pt waited 2 hrs after this, her second injection.  Next injection will wait 2 hrs, then 30 minutes for all remaining injections.

## 2019-06-07 ENCOUNTER — TELEPHONE (OUTPATIENT)
Dept: OPHTHALMOLOGY | Facility: CLINIC | Age: 79
End: 2019-06-07

## 2019-06-07 DIAGNOSIS — H25.11 NUCLEAR SCLEROTIC CATARACT OF RIGHT EYE: Primary | ICD-10-CM

## 2019-06-19 ENCOUNTER — TELEPHONE (OUTPATIENT)
Dept: PHARMACY | Facility: CLINIC | Age: 79
End: 2019-06-19

## 2019-06-24 ENCOUNTER — TELEPHONE (OUTPATIENT)
Dept: OPHTHALMOLOGY | Facility: CLINIC | Age: 79
End: 2019-06-24

## 2019-06-24 DIAGNOSIS — H25.12 NUCLEAR SCLEROTIC CATARACT OF LEFT EYE: Primary | ICD-10-CM

## 2019-06-29 DIAGNOSIS — L50.8 URTICARIA, ACUTE: ICD-10-CM

## 2019-07-01 ENCOUNTER — CLINICAL SUPPORT (OUTPATIENT)
Dept: INTERNAL MEDICINE | Facility: CLINIC | Age: 79
End: 2019-07-01
Payer: MEDICARE

## 2019-07-01 DIAGNOSIS — L50.1 URTICARIA, IDIOPATHIC: ICD-10-CM

## 2019-07-01 PROCEDURE — 96372 THER/PROPH/DIAG INJ SC/IM: CPT | Mod: PBBFAC,PO

## 2019-07-01 PROCEDURE — 99499 UNLISTED E&M SERVICE: CPT | Mod: S$PBB,,, | Performed by: ALLERGY & IMMUNOLOGY

## 2019-07-01 PROCEDURE — 99499 NO LOS: ICD-10-PCS | Mod: S$PBB,,, | Performed by: ALLERGY & IMMUNOLOGY

## 2019-07-01 RX ORDER — LEVOCETIRIZINE DIHYDROCHLORIDE 5 MG/1
TABLET, FILM COATED ORAL
Qty: 120 TABLET | Refills: 0 | Status: SHIPPED | OUTPATIENT
Start: 2019-07-01 | End: 2019-07-29 | Stop reason: SDUPTHER

## 2019-07-23 ENCOUNTER — TELEPHONE (OUTPATIENT)
Dept: PHARMACY | Facility: CLINIC | Age: 79
End: 2019-07-23

## 2019-07-23 ENCOUNTER — TELEPHONE (OUTPATIENT)
Dept: ALLERGY | Facility: CLINIC | Age: 79
End: 2019-07-23

## 2019-07-29 ENCOUNTER — CLINICAL SUPPORT (OUTPATIENT)
Dept: INTERNAL MEDICINE | Facility: CLINIC | Age: 79
End: 2019-07-29
Payer: MEDICARE

## 2019-07-29 DIAGNOSIS — L50.8 URTICARIA, ACUTE: ICD-10-CM

## 2019-07-29 DIAGNOSIS — L50.1 URTICARIA, IDIOPATHIC: ICD-10-CM

## 2019-07-29 PROCEDURE — 99499 UNLISTED E&M SERVICE: CPT | Mod: S$PBB,,, | Performed by: ALLERGY & IMMUNOLOGY

## 2019-07-29 PROCEDURE — 99499 NO LOS: ICD-10-PCS | Mod: S$PBB,,, | Performed by: ALLERGY & IMMUNOLOGY

## 2019-07-29 PROCEDURE — 96372 THER/PROPH/DIAG INJ SC/IM: CPT | Mod: PBBFAC,PO

## 2019-07-29 RX ORDER — LEVOCETIRIZINE DIHYDROCHLORIDE 5 MG/1
TABLET, FILM COATED ORAL
Qty: 120 TABLET | Refills: 0 | Status: SHIPPED | OUTPATIENT
Start: 2019-07-29 | End: 2019-08-27 | Stop reason: SDUPTHER

## 2019-08-12 ENCOUNTER — OFFICE VISIT (OUTPATIENT)
Dept: ALLERGY | Facility: CLINIC | Age: 79
End: 2019-08-12
Payer: MEDICARE

## 2019-08-12 VITALS
WEIGHT: 134.06 LBS | BODY MASS INDEX: 22.89 KG/M2 | HEIGHT: 64 IN | DIASTOLIC BLOOD PRESSURE: 72 MMHG | SYSTOLIC BLOOD PRESSURE: 128 MMHG

## 2019-08-12 DIAGNOSIS — L30.9 DERMATITIS DUE TO UNKNOWN CAUSE: Primary | ICD-10-CM

## 2019-08-12 DIAGNOSIS — L50.1 URTICARIA, IDIOPATHIC: ICD-10-CM

## 2019-08-12 PROCEDURE — 99213 OFFICE O/P EST LOW 20 MIN: CPT | Mod: PBBFAC | Performed by: ALLERGY & IMMUNOLOGY

## 2019-08-12 PROCEDURE — 99999 PR PBB SHADOW E&M-EST. PATIENT-LVL III: ICD-10-PCS | Mod: PBBFAC,,, | Performed by: ALLERGY & IMMUNOLOGY

## 2019-08-12 PROCEDURE — 99214 PR OFFICE/OUTPT VISIT, EST, LEVL IV, 30-39 MIN: ICD-10-PCS | Mod: S$PBB,,, | Performed by: ALLERGY & IMMUNOLOGY

## 2019-08-12 PROCEDURE — 99214 OFFICE O/P EST MOD 30 MIN: CPT | Mod: S$PBB,,, | Performed by: ALLERGY & IMMUNOLOGY

## 2019-08-12 PROCEDURE — 99999 PR PBB SHADOW E&M-EST. PATIENT-LVL III: CPT | Mod: PBBFAC,,, | Performed by: ALLERGY & IMMUNOLOGY

## 2019-08-12 RX ORDER — PIMECROLIMUS 10 MG/G
CREAM TOPICAL 2 TIMES DAILY
Qty: 100 G | Refills: 6 | Status: SHIPPED | OUTPATIENT
Start: 2019-08-12 | End: 2019-08-13 | Stop reason: CLARIF

## 2019-08-12 RX ORDER — HYDROXYZINE HYDROCHLORIDE 25 MG/1
25 TABLET, FILM COATED ORAL 3 TIMES DAILY PRN
Qty: 30 TABLET | Refills: 2 | Status: SHIPPED | OUTPATIENT
Start: 2019-08-12 | End: 2019-08-13 | Stop reason: CLARIF

## 2019-08-12 NOTE — PROGRESS NOTES
Subjective:      Patient ID: Gabriela Edwards is a 79 y.o. female.     4/1/19    Chief Complaint: Follow-up (Xolair)  fu chronic idiopathic urticaria, recurrent dermatitis      History of Present Illness:   Pt w hx chronic/recurrent idiopathic urticaria for approx 3+ years. Started Xolair/omalizumab 4 months ago.  On routine levocetrizine 20 mg twice daily and omalizumab, has no longer had the chronic, generalized itching and suspected urticaria that led to chronic urticaria diagnosis 3+ years ago.     Over the last year though, and more frequently over the last 4 months, she has been experiencing recurrent dermatitis of her neck, shoulders, and face. These episodes do not seem consistent w urticaria. Photos she provides seem more c/w erythema, flushing of her neck, face, chest, shoulders. With these episodes, which often last about a week, she describes burning sensation as well as itching. After about a week, reports peeling at affected areas.  Notes partial relief w bursts of oral steroids, usu 20 mg daily x 3 days. On prednisone, often has trouble sleeping.  Has not found omalizumab at all helpful w these episodes. Does not appreciate any consistent aggravating factors. Denies assoc consititional sx's.      Additional History:  Pt has seizure disorder, on Keppra x 20 yrs.  No changes.   Patient Active Problem List   Diagnosis    DON (conjunctival intraepithelial neoplasia)    Allergic reaction    Urticaria, idiopathic    Allergic rhinitis    Eczema       Review of Systems   Constitutional: Negative for chills, fever and malaise/fatigue.   HENT: Negative for ear discharge.    Eyes: Negative for pain and discharge.   Respiratory: Negative for hemoptysis, shortness of breath, wheezing and stridor.    Cardiovascular: Negative for chest pain and palpitations.   Gastrointestinal: Negative for abdominal pain, diarrhea, nausea and vomiting.   Skin: Positive for rash. Negative for itching.   Neurological:  Negative for seizures and loss of consciousness.   Psychiatric/Behavioral: The patient is not nervous/anxious.        Objective:     Vitals:    08/12/19 1048   BP: 128/72       Physical Exam   Constitutional: She is oriented to person, place, and time.   HENT:   Head: Normocephalic and atraumatic.   Right Ear: External ear normal.   Left Ear: External ear normal.   Nose: Nose normal.   Eyes: Conjunctivae are normal. No scleral icterus.   Neck: Neck supple.   Cardiovascular: Normal rate and regular rhythm.   Pulmonary/Chest: Effort normal. No stridor. No respiratory distress.   Abdominal: Soft. She exhibits no distension.   Musculoskeletal: She exhibits no edema.   Neurological: She is alert and oriented to person, place, and time.   Skin: There is erythema (mild ertyhema of neck).   Psychiatric: Memory and judgment normal.             Assessment:     1. Dermatitis due to unknown cause --recent recurrent dermatitis is not c/w urticaria, is distinct from previous generalized pruritus sx's. No relief w omalizumab. Frequent use/need oral steroids   2. Urticaria, idiopathic        Plan:     Discontinue omalizumab  Dermatology referral for further eval. Poss vasculitis?  Continue levocetirizine 20 mg twice daily    freq use aquaphor, vaseline to keep affected areas moisturized  Prn TCN 0.1% cream to affected areas on neck, chest, shoulders  Prn elidel to  face  If no improvement, prednisone taper to start at 40 or 60 mg

## 2019-08-13 RX ORDER — TRIAMCINOLONE ACETONIDE 1 MG/G
CREAM TOPICAL 2 TIMES DAILY PRN
COMMUNITY
End: 2019-09-17 | Stop reason: CLARIF

## 2019-08-15 ENCOUNTER — TELEPHONE (OUTPATIENT)
Dept: OPHTHALMOLOGY | Facility: CLINIC | Age: 79
End: 2019-08-15

## 2019-08-15 NOTE — TELEPHONE ENCOUNTER
Patient given arrival time for surgery as 11 am.  Nothing to eat or drink after 9 pm night before.  May have water/gatorade/powerade from 9 pm until leaves house morning of surgery.

## 2019-08-19 ENCOUNTER — ANESTHESIA (OUTPATIENT)
Dept: SURGERY | Facility: OTHER | Age: 79
End: 2019-08-19
Payer: MEDICARE

## 2019-08-19 ENCOUNTER — HOSPITAL ENCOUNTER (OUTPATIENT)
Facility: OTHER | Age: 79
Discharge: HOME | End: 2019-08-19
Attending: OPHTHALMOLOGY | Admitting: OPHTHALMOLOGY
Payer: MEDICARE

## 2019-08-19 ENCOUNTER — ANESTHESIA EVENT (OUTPATIENT)
Dept: SURGERY | Facility: OTHER | Age: 79
End: 2019-08-19
Payer: MEDICARE

## 2019-08-19 VITALS
HEART RATE: 57 BPM | WEIGHT: 130 LBS | OXYGEN SATURATION: 97 % | HEIGHT: 64 IN | SYSTOLIC BLOOD PRESSURE: 108 MMHG | TEMPERATURE: 98 F | RESPIRATION RATE: 16 BRPM | DIASTOLIC BLOOD PRESSURE: 62 MMHG | BODY MASS INDEX: 22.2 KG/M2

## 2019-08-19 DIAGNOSIS — H25.13 NUCLEAR SCLEROSIS, BILATERAL: ICD-10-CM

## 2019-08-19 PROCEDURE — 37000009 HC ANESTHESIA EA ADD 15 MINS: Performed by: OPHTHALMOLOGY

## 2019-08-19 PROCEDURE — 25000003 PHARM REV CODE 250: Performed by: OPHTHALMOLOGY

## 2019-08-19 PROCEDURE — 66984 PR REMOVAL, CATARACT, W/INSRT INTRAOC LENS, W/O ENDO CYCLO: ICD-10-PCS | Mod: RT,,, | Performed by: OPHTHALMOLOGY

## 2019-08-19 PROCEDURE — 36000707: Performed by: OPHTHALMOLOGY

## 2019-08-19 PROCEDURE — 36000706: Performed by: OPHTHALMOLOGY

## 2019-08-19 PROCEDURE — 71000015 HC POSTOP RECOV 1ST HR: Performed by: OPHTHALMOLOGY

## 2019-08-19 PROCEDURE — V2787 ASTIGMATISM-CORRECT FUNCTION: HCPCS | Performed by: OPHTHALMOLOGY

## 2019-08-19 PROCEDURE — 66999 PR FEMTO TORIC: ICD-10-PCS | Mod: CSM,RT,, | Performed by: OPHTHALMOLOGY

## 2019-08-19 PROCEDURE — 37000008 HC ANESTHESIA 1ST 15 MINUTES: Performed by: OPHTHALMOLOGY

## 2019-08-19 PROCEDURE — 63600175 PHARM REV CODE 636 W HCPCS: Performed by: NURSE ANESTHETIST, CERTIFIED REGISTERED

## 2019-08-19 PROCEDURE — 66999 UNLISTED PX ANT SEGMENT EYE: CPT | Mod: CSM,RT,, | Performed by: OPHTHALMOLOGY

## 2019-08-19 PROCEDURE — 66984 XCAPSL CTRC RMVL W/O ECP: CPT | Mod: RT,,, | Performed by: OPHTHALMOLOGY

## 2019-08-19 DEVICE — IMPLANTABLE DEVICE: Type: IMPLANTABLE DEVICE | Site: EYE | Status: FUNCTIONAL

## 2019-08-19 RX ORDER — TETRACAINE HYDROCHLORIDE 5 MG/ML
1 SOLUTION OPHTHALMIC
Status: COMPLETED | OUTPATIENT
Start: 2019-08-19 | End: 2019-08-19

## 2019-08-19 RX ORDER — ACETAMINOPHEN 325 MG/1
650 TABLET ORAL EVERY 4 HOURS PRN
Status: DISCONTINUED | OUTPATIENT
Start: 2019-08-19 | End: 2019-08-19 | Stop reason: HOSPADM

## 2019-08-19 RX ORDER — LIDOCAINE HYDROCHLORIDE 40 MG/ML
INJECTION, SOLUTION RETROBULBAR
Status: DISCONTINUED | OUTPATIENT
Start: 2019-08-19 | End: 2019-08-19 | Stop reason: HOSPADM

## 2019-08-19 RX ORDER — MOXIFLOXACIN 5 MG/ML
1 SOLUTION/ DROPS OPHTHALMIC
Status: COMPLETED | OUTPATIENT
Start: 2019-08-19 | End: 2019-08-19

## 2019-08-19 RX ORDER — PROPARACAINE HYDROCHLORIDE 5 MG/ML
1 SOLUTION/ DROPS OPHTHALMIC
Status: DISCONTINUED | OUTPATIENT
Start: 2019-08-19 | End: 2019-08-19 | Stop reason: HOSPADM

## 2019-08-19 RX ORDER — MOXIFLOXACIN 5 MG/ML
SOLUTION/ DROPS OPHTHALMIC
Status: DISCONTINUED | OUTPATIENT
Start: 2019-08-19 | End: 2019-08-19 | Stop reason: HOSPADM

## 2019-08-19 RX ORDER — PHENYLEPHRINE HYDROCHLORIDE 100 MG/ML
1 SOLUTION/ DROPS OPHTHALMIC ONCE
Status: COMPLETED | OUTPATIENT
Start: 2019-08-19 | End: 2019-08-19

## 2019-08-19 RX ORDER — MIDAZOLAM HYDROCHLORIDE 1 MG/ML
INJECTION INTRAMUSCULAR; INTRAVENOUS
Status: DISCONTINUED | OUTPATIENT
Start: 2019-08-19 | End: 2019-08-19

## 2019-08-19 RX ORDER — PHENYLEPHRINE HYDROCHLORIDE 25 MG/ML
1 SOLUTION/ DROPS OPHTHALMIC
Status: COMPLETED | OUTPATIENT
Start: 2019-08-19 | End: 2019-08-19

## 2019-08-19 RX ORDER — TETRACAINE HYDROCHLORIDE 5 MG/ML
SOLUTION OPHTHALMIC
Status: DISCONTINUED | OUTPATIENT
Start: 2019-08-19 | End: 2019-08-19 | Stop reason: HOSPADM

## 2019-08-19 RX ORDER — TROPICAMIDE 10 MG/ML
1 SOLUTION/ DROPS OPHTHALMIC
Status: COMPLETED | OUTPATIENT
Start: 2019-08-19 | End: 2019-08-19

## 2019-08-19 RX ADMIN — TROPICAMIDE 1 DROP: 10 SOLUTION/ DROPS OPHTHALMIC at 11:08

## 2019-08-19 RX ADMIN — MIDAZOLAM HYDROCHLORIDE 1 MG: 1 INJECTION, SOLUTION INTRAMUSCULAR; INTRAVENOUS at 12:08

## 2019-08-19 RX ADMIN — MOXIFLOXACIN HYDROCHLORIDE 1 DROP: 5 SOLUTION/ DROPS OPHTHALMIC at 11:08

## 2019-08-19 RX ADMIN — TETRACAINE HYDROCHLORIDE 1 DROP: 5 SOLUTION OPHTHALMIC at 11:08

## 2019-08-19 RX ADMIN — PHENYLEPHRINE HYDROCHLORIDE 1 DROP: 25 SOLUTION/ DROPS OPHTHALMIC at 11:08

## 2019-08-19 RX ADMIN — MOXIFLOXACIN 1 DROP: 5 SOLUTION/ DROPS OPHTHALMIC at 01:08

## 2019-08-19 NOTE — OP NOTE
SURGEON:  Maryuri Freeman M.D.    PREOPERATIVE DIAGNOSIS:    Nuclear Sclerotic Cataract Right Eye    POSTOPERATIVE DIAGNOSIS:    Nuclear Sclerotic Cataract Right Eye    PROCEDURES:    Phacoemulsification with  intraocular lens, Right eye (05768)  With Femtosecond LASER assist    DATE OF SURGERY: 08/19/2019    IMPLANT: OOI969 24.5    ANESTHESIA:  MAC with topical Lidocaine    COMPLICATIONS:  None    ESTIMATED BLOOD LOSS: None    SPECIMENS: None    INDICATIONS:    The patient has a history of painless progressive visual loss and  difficulty with activities of daily living secondary to cataract formation.  After a thorough discussion of the risks, benefits, and alternatives to cataract surgery, including, but not limited to, the rare risks of infection, retinal detachment, hemorrhage, need for additional surgery, loss of vision, and even loss of the eye, the patient voices understanding and desires to proceed.    DESCRIPTION OF PROCEDURE:    After verification of consent and marking of the operative eye, the patient was positioned under the femtosecond LASER. Topical anesthetic drops were administered. A surgical timeout was initiated with verification of patient identifiers and the laser surgical plan. The eye was docked securely and the laser portion of the cataract procedure was carried out without complication.  The patient was returned to the pre-operative area to await the intraocular surgical portion of the cataract procedure.  The patients IOL calculations were reviewed, and the lens selection confirmed.   After verification and marking of the proper eye in the preop holding area, the patient was brought to the operating room in supine position where the eye was prepped and draped in standard sterile fashion with 5% Betadine and a lid speculum placed in the eye.   Topical 4% Lidocaine was used in addition to the preoperative anesthesia and the procedure was begun by the creation of a paracentesis incision through  which viscoelastic was used to fill the anterior chamber.  Next, a keratome blade was used to create a triplanar temporal clear corneal incision and a cystotome and Utrata forceps used to fashion a continuous curvilinear capsulorrhexis.  Hydrodissection was carried out using the Quinones hydrodissection cannula and the nucleus was found to be mobile.  Phacoemulsification of the nucleus was carried out using a quick chop technique, and all remaining epinuclear and cortical material was removed.  The eye was then reformed with Viscoelastic and the  intraocular lens was implanted into the capsular bag.  All remaining viscoelastics were removed from the eye and at the end of the case the pupil was round, the lens was well-centered within the capsular bag and all wounds were found to be water tight.  Drops of Vigamox and Pred Forte were instilled and a shield was placed over the eye. The patient will follow up with Dr. Freeman in the morning.

## 2019-08-19 NOTE — ANESTHESIA POSTPROCEDURE EVALUATION
Anesthesia Post Evaluation    Patient: Gabriela Edwards    Procedure(s) Performed: Procedure(s) (LRB):  EXTRACTION, CATARACT, WITH IOL INSERTION (Right)    Final Anesthesia Type: MAC  Patient location during evaluation: [facility]  Patient participation: Yes- Able to Participate  Level of consciousness: awake and alert, awake and oriented  Post-procedure vital signs: reviewed and stable  Pain management: adequate  Airway patency: patent  PONV status at discharge: No PONV  Anesthetic complications: no      Cardiovascular status: blood pressure returned to baseline  Respiratory status: unassisted, spontaneous ventilation and room air  Hydration status: euvolemic  Follow-up not needed.          Vitals Value Taken Time   /58 8/19/2019 11:13 AM   Temp 36.5 °C (97.7 °F) 8/19/2019 11:13 AM   Pulse 54 8/19/2019 11:13 AM   Resp 18 8/19/2019 11:13 AM   SpO2 99 % 8/19/2019 11:13 AM         No case tracking events are documented in the log.      Pain/Guy Score: No data recorded

## 2019-08-19 NOTE — ANESTHESIA PREPROCEDURE EVALUATION
08/19/2019  Gabriela Edwards is a 79 y.o., female.    Anesthesia Evaluation    I have reviewed the Patient Summary Reports.    I have reviewed the Nursing Notes.   I have reviewed the Medications.     Review of Systems  Anesthesia Hx:  No problems with previous Anesthesia    Social:  Non-Smoker    Cardiovascular:   Exercise tolerance: good    Pulmonary:  Pulmonary Normal    Hepatic/GI:  Hepatic/GI Normal    Neurological:   Seizures, well controlled    Endocrine:  Endocrine Normal    Dermatological:  Skin Normal    Psych:  Psychiatric Normal           Physical Exam  General:  Well nourished    Airway/Jaw/Neck:  Airway Findings: Mouth Opening: Normal Tongue: Normal  General Airway Assessment: Adult  Mallampati: II  TM Distance: Normal, at least 6 cm  Jaw/Neck Findings:     Neck ROM: Normal ROM      Dental:  Dental Findings: In tact             Anesthesia Plan  Type of Anesthesia, risks & benefits discussed:  Anesthesia Type:  MAC  Patient's Preference:   Intra-op Monitoring Plan:   Intra-op Monitoring Plan Comments:   Post Op Pain Control Plan:   Post Op Pain Control Plan Comments:   Induction:   IV  Beta Blocker:         Informed Consent: Patient understands risks and agrees with Anesthesia plan.  Questions answered. Anesthesia consent signed with patient.  ASA Score: 3     Day of Surgery Review of History & Physical:    H&P update referred to the surgeon.         Ready For Surgery From Anesthesia Perspective.

## 2019-08-19 NOTE — DISCHARGE SUMMARY
Outcome: Successful outpatient ophthalmic surgical procedure  Preprinted Instructions given to patient.  Regular diet.  Activity: No restrictions  Meds: see Med Rec  Condition: stable  Follow up: 1 day with Dr Freeman  Disposition: Home  Diagnosis: s/p eye surgery

## 2019-08-19 NOTE — DISCHARGE INSTRUCTIONS
Maryuri Freeman MD  [facility]  Department of Ophthalmology      AFTER: Cataract Surgery:  Relax at home and DO NOT exert yourself for the rest of the day.  Plan to see Dr. Freeman tomorrow at the eye clinic:   [facility]  [place] [address]    Refer to attached eye drop instruction sheet     Precautions:  DO NOT rub your eye.  You may resume moderate activity the day after surgery.  Wear protective sunglasses during the day and a shield at night for 1(one) week.  If you have pain, redness and decreased vision, call Dr. Freeman (or the on-call doctor after hours) @[phone].            Home Care Instructions for Eye Surgeries    1. ACTIVITY:  Limit your activity today. Relax at home and DO NOT exert yourself for the rest of the day. Increase activity gradually. You may return to work or school as directed by your physician.    2. DIET:  Drink plenty of fluids. Resume your normal diet unless instructed otherwise.    3. PAIN:  Expect a moderate amount of pain. If a prescription for pain is not sent home with you, you may take your commonly used pain reliever as directed. If this is not sufficient, call your physician. You may resume any other prescription medication unless otherwise directed by your physician.     Discuss any problem with your physician as soon as it arises. Do not Delay.      EMERGENCY- If you are unable to contact your physician, please go to the nearest Emergency Room.       Anesthesia: Monitored Anesthesia Care (MAC)    Anesthesia Safety  · Have an adult family member or friend drive you home after the procedure.  · For the first 24 hours after your surgery:  · Do not drive or use heavy equipment.  · Do not make important decisions or sign documents.  · Avoid alcohol.  · Have someone stay with you, if possible. They can watch for problems and help keep you safe.

## 2019-08-19 NOTE — PLAN OF CARE
Gabriela FLORES WaltonEdwards has met all discharge criteria from Phase II. Vital Signs are stable, ambulating  without difficulty. Discharge instructions given, patient verbalized understanding. Discharged from facility via wheelchair in stable condition. With her sister driving

## 2019-08-20 ENCOUNTER — OFFICE VISIT (OUTPATIENT)
Dept: OPHTHALMOLOGY | Facility: CLINIC | Age: 79
End: 2019-08-20
Attending: OPHTHALMOLOGY
Payer: MEDICARE

## 2019-08-20 DIAGNOSIS — Z98.890 POST-OPERATIVE STATE: ICD-10-CM

## 2019-08-20 DIAGNOSIS — H25.11 NUCLEAR SCLEROTIC CATARACT OF RIGHT EYE: Primary | ICD-10-CM

## 2019-08-20 PROCEDURE — 99024 POSTOP FOLLOW-UP VISIT: CPT | Mod: POP,,, | Performed by: OPHTHALMOLOGY

## 2019-08-20 PROCEDURE — 99999 PR PBB SHADOW E&M-EST. PATIENT-LVL III: CPT | Mod: PBBFAC,,, | Performed by: OPHTHALMOLOGY

## 2019-08-20 PROCEDURE — 99024 PR POST-OP FOLLOW-UP VISIT: ICD-10-PCS | Mod: POP,,, | Performed by: OPHTHALMOLOGY

## 2019-08-20 PROCEDURE — 99999 PR PBB SHADOW E&M-EST. PATIENT-LVL III: ICD-10-PCS | Mod: PBBFAC,,, | Performed by: OPHTHALMOLOGY

## 2019-08-20 PROCEDURE — 99213 OFFICE O/P EST LOW 20 MIN: CPT | Mod: PBBFAC | Performed by: OPHTHALMOLOGY

## 2019-08-20 NOTE — PROGRESS NOTES
HPI     Post-op Evaluation      Additional comments: 1 day PO OD. CE IOL 8/19/2019 OD.               Comments     79 y.o. Female is here for 1 day PO OD. CE IOL 8/19/2019 OD. Denies eye   pain and floaters. Notice flash of light, right eye. No discomfort.     Eye Meds: PGB TID OD            Last edited by CHRIS Vila on 8/20/2019  9:09 AM. (History)            Assessment /Plan     For exam results, see Encounter Report.    Nuclear sclerotic cataract of right eye    Post-operative state      Slit lamp exam:  L/L: nl  K: clear, wound sealed  AC: 1+ cell  Lens: IOL centered and stable    POD1 s/p Phaco/IOL  Appropriate precautions and post op medications reviewed.  Patient instructed to call or come in if symptoms of redness, decreased vision, or pain are experienced.

## 2019-08-21 ENCOUNTER — TELEPHONE (OUTPATIENT)
Dept: PHARMACY | Facility: CLINIC | Age: 79
End: 2019-08-21

## 2019-08-21 ENCOUNTER — PATIENT MESSAGE (OUTPATIENT)
Dept: PHARMACY | Facility: CLINIC | Age: 79
End: 2019-08-21

## 2019-08-21 NOTE — TELEPHONE ENCOUNTER
Call to assess need for Xolair, appears to be discontinuing medication. Not able to leave message. Sent MyChart.    Behzad Duong, PharmD  Clinical Pharmacist  [facility]  P: [phone]

## 2019-08-27 ENCOUNTER — OFFICE VISIT (OUTPATIENT)
Dept: OPHTHALMOLOGY | Facility: CLINIC | Age: 79
End: 2019-08-27
Attending: OPHTHALMOLOGY
Payer: MEDICARE

## 2019-08-27 DIAGNOSIS — L50.8 URTICARIA, ACUTE: ICD-10-CM

## 2019-08-27 DIAGNOSIS — Z98.890 POST-OPERATIVE STATE: Primary | ICD-10-CM

## 2019-08-27 DIAGNOSIS — H25.11 NUCLEAR SCLEROTIC CATARACT OF RIGHT EYE: ICD-10-CM

## 2019-08-27 DIAGNOSIS — H25.12 NUCLEAR SCLEROSIS OF LEFT EYE: ICD-10-CM

## 2019-08-27 PROCEDURE — 99024 POSTOP FOLLOW-UP VISIT: CPT | Mod: POP,,, | Performed by: OPHTHALMOLOGY

## 2019-08-27 PROCEDURE — 99024 PR POST-OP FOLLOW-UP VISIT: ICD-10-PCS | Mod: POP,,, | Performed by: OPHTHALMOLOGY

## 2019-08-27 PROCEDURE — 99999 PR PBB SHADOW E&M-EST. PATIENT-LVL II: ICD-10-PCS | Mod: PBBFAC,,, | Performed by: OPHTHALMOLOGY

## 2019-08-27 PROCEDURE — 99212 OFFICE O/P EST SF 10 MIN: CPT | Mod: PBBFAC | Performed by: OPHTHALMOLOGY

## 2019-08-27 PROCEDURE — 99999 PR PBB SHADOW E&M-EST. PATIENT-LVL II: CPT | Mod: PBBFAC,,, | Performed by: OPHTHALMOLOGY

## 2019-08-27 RX ORDER — TETRACAINE HYDROCHLORIDE 5 MG/ML
1 SOLUTION OPHTHALMIC
Status: CANCELLED | OUTPATIENT
Start: 2019-08-27

## 2019-08-27 RX ORDER — TROPICAMIDE 10 MG/ML
1 SOLUTION/ DROPS OPHTHALMIC
Status: CANCELLED | OUTPATIENT
Start: 2019-08-27

## 2019-08-27 RX ORDER — LEVOCETIRIZINE DIHYDROCHLORIDE 5 MG/1
TABLET, FILM COATED ORAL
Qty: 120 TABLET | Refills: 0 | Status: SHIPPED | OUTPATIENT
Start: 2019-08-27 | End: 2019-09-24 | Stop reason: SDUPTHER

## 2019-08-27 RX ORDER — PREDNISONE 10 MG/1
TABLET ORAL
Qty: 60 TABLET | Refills: 0 | Status: SHIPPED | OUTPATIENT
Start: 2019-08-27 | End: 2019-09-17 | Stop reason: CLARIF

## 2019-08-27 RX ORDER — MOXIFLOXACIN 5 MG/ML
1 SOLUTION/ DROPS OPHTHALMIC
Status: CANCELLED | OUTPATIENT
Start: 2019-08-27

## 2019-08-27 RX ORDER — PHENYLEPHRINE HYDROCHLORIDE 25 MG/ML
1 SOLUTION/ DROPS OPHTHALMIC
Status: CANCELLED | OUTPATIENT
Start: 2019-08-27

## 2019-08-27 NOTE — PROGRESS NOTES
HPI     79 y.o. Female is here for 1 week PO OD. CE IOL 8/19/2019 OD. Denies eye   pain and floaters. Notice flash of light, right eye. No discomfort.     Eye Meds: PGB TID OD      Last edited by Mandie Urbano on 8/27/2019  9:39 AM. (History)            Assessment /Plan     For exam results, see Encounter Report.    Post-operative state    Nuclear sclerotic cataract of right eye      Slit lamp exam:  L/L: nl  K: clear, wound sealed  AC: trace cell  Iris/Lens: IOL centered and stable    POW1 s/p phaco: Surgery healing well with no signs of infection or abnormal inflammation.    Patient wishes to proceed with surgery in the second eye. Risks, benefits, alternatives reviewed. IOL selection reviewed.    Visually Significant Cataract: Patient reports decreased vision consistent with the clinical amount of lenticular opacity, which reaches the level of visual significance and affects activities of daily living. Risks, benefits, and alternatives to cataract surgery were discussed and the consent reviewed. IOL options were discussed, including ATIOLs and the associated side effects and additional patient cost associated with them.   IOL Selections:     Left eye  IOL: OBR219 23.0  at 15   (OD increased from 24.0-24.5 with myopic -0.50 result, so target plano OS)    The patient expresses a desire to reduce spectacle dependence. I reviewed various IOL and LASER refractive surgical options and we will attempt to minimize spectacle dependence by managing astigmatism and optimizing IOL selection. Femtosecond LASER assisted cataract surgery (FLACS) technology was explained to the patient with educational videos and discussion.  The patient voices understanding and wishes to implement this technology during the cataract procedure.  I explained the increased precision of the LASER versus manual techniques, especially as it relates to astigmatism reduction with arcuate incisions.  I emphasized that although our goal is to reduce the  need for refractive correction after surgery, there may still be a need for spectacle correction to achieve optimal visual acuity, and that a reasonable range of functional vision should be the expectation.  No guarantees are made about post operative refraction or visual acuity, as the eye may heal in unpredictable ways, and the standard risks, benefits, and alternatives to cataract surgery were explained.  The patient understands that the refractive portions of this cataract procedure are not covered by insurance, and that there is an out of pocket expense of $1500 per eye. I also explained that even though our pre-operative plan is to utilize advanced refractive technologies during surgery, that I may decide to eliminate part or all of this plan if surgical challenges or complications arise, or I feel that it is not in the patient's best interest. Consent forms and an ABN form were given to the patient to review.    Catalys Parameters:    Left Eye:   KATIUSKA:  12mm   ?Need to nelli patient sitting up?: n  Capsulotomy: Scanned Capsule  rdGrdrrdarddrderd:rd rd3rd Arcuate:  Anterior Penetrating:  Symmetric.  Length: 25  at  Axis: 20  Incisions:  OFF

## 2019-09-06 ENCOUNTER — OFFICE VISIT (OUTPATIENT)
Dept: DERMATOLOGY | Facility: CLINIC | Age: 79
End: 2019-09-06
Payer: MEDICARE

## 2019-09-06 DIAGNOSIS — L30.9 ECZEMA, UNSPECIFIED TYPE: ICD-10-CM

## 2019-09-06 DIAGNOSIS — L23.9 ALLERGIC CONTACT DERMATITIS, UNSPECIFIED TRIGGER: Primary | ICD-10-CM

## 2019-09-06 PROCEDURE — 99202 PR OFFICE/OUTPT VISIT, NEW, LEVL II, 15-29 MIN: ICD-10-PCS | Mod: S$PBB,,, | Performed by: DERMATOLOGY

## 2019-09-06 PROCEDURE — 99999 PR PBB SHADOW E&M-EST. PATIENT-LVL III: CPT | Mod: PBBFAC,,, | Performed by: DERMATOLOGY

## 2019-09-06 PROCEDURE — 99999 PR PBB SHADOW E&M-EST. PATIENT-LVL III: ICD-10-PCS | Mod: PBBFAC,,, | Performed by: DERMATOLOGY

## 2019-09-06 PROCEDURE — 99213 OFFICE O/P EST LOW 20 MIN: CPT | Mod: PBBFAC | Performed by: DERMATOLOGY

## 2019-09-06 PROCEDURE — 99202 OFFICE O/P NEW SF 15 MIN: CPT | Mod: S$PBB,,, | Performed by: DERMATOLOGY

## 2019-09-06 NOTE — PROGRESS NOTES
Subjective:       Patient ID:  Gabriela Edwards is a 79 y.o. female who presents for   Chief Complaint   Patient presents with    Rash     Face and neck      Pt presents today for a rash on the face and neck that she has had on and off x 3 yrs. Pt states she had itching all over in 2016 which was cured when she saw Dr. Kahn who prescribed her levocetirizine 4x/day and a steroid cream. She was fine for 2 yrs, then she broke out in hives. States after taking prednisone and cortisone shots, she took Xolair x 4 months which gave her no relief. States Dr. Kahn then gave her TAC cream which patient has used successfully to treat 2 breakouts in the past 3 weeks. Pt's last outbreak was on August 11, 2019. She states now the rash always starts on her L neck then progresses to the L side of her mouth and her upper lip then spreads to the rest of her face and neck.     Pt states the rash itches and burns severely and skin swells. skin also flakes after the rash has started to go away.   Pt is currently clear today but she has photos.    Rash  - Initial  Affected locations: face  Duration: 4 months  Signs / symptoms: burning, itching and peeling  Aggravated by: nothing  Relieving factors/Treatments tried: oral steroids  Improvement on treatment: moderate      Was patch tested in June 2016 (TRUE test) with following reactions:  3+ nickel sulfate, 3+ carba mix, 3+ formaldehyde, +2 imidazolidinyl urea, 3+ 2-bromo-2- nitropropane-1,3-diol    Uses [employer] products which she's used for years.    Has had eczema since childhood as well as bad hayfever as a child.    Review of Systems   Constitutional: Negative for fever, chills, weight loss, weight gain, fatigue, night sweats and malaise.   Skin: Positive for rash and activity-related sunscreen use. Negative for daily sunscreen use and recent sunburn.   Hematologic/Lymphatic: Does not bruise/bleed easily.        Objective:    Physical Exam   Constitutional: She  appears well-developed and well-nourished. No distress.   Neurological: She is alert and oriented to person, place, and time. She is not disoriented.   Psychiatric: She has a normal mood and affect.   Skin:   Areas Examined (abnormalities noted in diagram):   Head / Face Inspection Performed  Neck Inspection Performed  Chest / Axilla Inspection Performed  RUE Inspected  LUE Inspection Performed  RLE Inspected  LLE Inspection Performed              Diagram Legend     Erythematous scaling macule/papule c/w actinic keratosis       Vascular papule c/w angioma      Pigmented verrucoid papule/plaque c/w seborrheic keratosis      Yellow umbilicated papule c/w sebaceous hyperplasia      Irregularly shaped tan macule c/w lentigo     1-2 mm smooth white papules consistent with Milia      Movable subcutaneous cyst with punctum c/w epidermal inclusion cyst      Subcutaneous movable cyst c/w pilar cyst      Firm pink to brown papule c/w dermatofibroma      Pedunculated fleshy papule(s) c/w skin tag(s)      Evenly pigmented macule c/w junctional nevus     Mildly variegated pigmented, slightly irregular-bordered macule c/w mildly atypical nevus      Flesh colored to evenly pigmented papule c/w intradermal nevus       Pink pearly papule/plaque c/w basal cell carcinoma      Erythematous hyperkeratotic cursted plaque c/w SCC      Surgical scar with no sign of skin cancer recurrence      Open and closed comedones      Inflammatory papules and pustules      Verrucoid papule consistent consistent with wart     Erythematous eczematous patches and plaques     Dystrophic onycholytic nail with subungual debris c/w onychomycosis     Umbilicated papule    Erythematous-base heme-crusted tan verrucoid plaque consistent with inflamed seborrheic keratosis     Erythematous Silvery Scaling Plaque c/w Psoriasis     See annotation      Assessment / Plan:        Allergic contact dermatitis, unspecified trigger  -     Patch Testing; Future    Counseled  pt that the photos look consistent with a contact dermatitis. I recommended repeating patch testing with us since we test for more allergens and since it's been a few years since it was last done. I will plug her current allergens into the CARD database and send her an updated list of safe products based on her previous patch test results.  Pt is currently content with using the TAC that she has for flares.    Eczema, unspecified type  Pt also has a diffuse eczematous rash which she states she's had for years but she's learned to live with it. Counseled her that she may use the TAC here too and that if it doesn't clear after repeating the patch test, then there are other systemic meds she can consider to treat it.     rtc for patch testing.

## 2019-09-06 NOTE — LETTER
September 8, 2019      Claude Kahn MD  [address]  [place] [address]           [facility] - Dermatology  [address]  [facility] [address]  Phone: [phone]  Fax: [phone]          Patient: Gabriela Edwards   MR Number: [id number]   YOB: 1940   Date of Visit: 9/6/2019       Dear Dr. Claude Kahn:    Thank you for referring Gabriela Edwards to me for evaluation. Attached you will find relevant portions of my assessment and plan of care.    If you have questions, please do not hesitate to call me. I look forward to following Gabriela Edwards along with you.    Sincerely,    Twila Martinez MD    Enclosure  CC:  No Recipients    If you would like to receive this communication electronically, please contact [email or web address] or [phone] to request more information on [employer] Link access.    For providers and/or their staff who would like to refer a patient to [facility], please contact us through our one-stop-shop provider referral line, [facility], at [phone].    If you feel you have received this communication in error or would no longer like to receive these types of communications, please e-mail [email or web address]

## 2019-09-08 NOTE — PATIENT INSTRUCTIONS
XEROSIS (DRY SKIN)        1. Definition    Xerosis is the term for dry skin.  We all have a natural oil coating over our skin produced by the skin oil glands.  If this oil is removed, the skin becomes dry which can lead to cracking, which can lead to inflammation.  Xerosis is usually a long-term problem that recurs often, especially in the winter.    2. Cause     Long hot baths or showers can remove our natural oil and lead to xerosis.  One should never take more than one bath or shower a day and for no longer than ten minutes.   Use of harsh soaps such as Zest, Dial, and Ivory can worsen and cause xerosis.   Cold winter weather worsens xerosis because the amount of moisture contained in cold air is much less than the amount of moisture in warm air.    3. Treatment     Treatment is intended to restore the natural oil to your skin.  Keep the skin lubricated.     Do not take more than one bath or shower a day.  Use lukewarm water, not hot.  Hot water dries out the skin.     Use a gentle moisturizing soap such as Cetaphil soap, Oil of Olay, Dove, Basis, Ivory moisture care, Restoraderm cleanser.     When toweling dry, dont rub.  Blot the skin so there is still some water left on the skin.  You should apply a moisturizing cream to all of the skin such as Cerave cream, Cetaphil cream, Restoraderm or Eucerin Original Formula cream.   Alpha hydroxyacid lotions, i.e., AmLactin, also work very well for preventing dry skin, but may burn when used on inflamed or reddened skin.     If you like to swim during the winter months, you should not use soap when getting out of the pool.  When you have finished swimming, rinse off the chlorine with cool to warm water.  If this will be the only shower of the day, then you may use Cetaphil or another mild soap to cleanse your skin.  After the shower, apply a moisturizing cream to all of the skin as above.        [address] [place] [address]/ [phone]  [phone] FAX/ [email or web address]

## 2019-09-17 ENCOUNTER — TELEPHONE (OUTPATIENT)
Dept: DERMATOLOGY | Facility: CLINIC | Age: 79
End: 2019-09-17

## 2019-09-18 ENCOUNTER — TELEPHONE (OUTPATIENT)
Dept: OPHTHALMOLOGY | Facility: CLINIC | Age: 79
End: 2019-09-18

## 2019-09-18 NOTE — TELEPHONE ENCOUNTER
LESLIE for patient returning call. Drops were already sent to pharmacy, told her to call back if she was having problems getting them.

## 2019-09-18 NOTE — TELEPHONE ENCOUNTER
----- Message from Carolina Duong sent at 9/18/2019 10:18 AM CDT -----  Contact: Gabriela Saunders calling to see if we can put her order in for the pre surgery drops with the pharmacy.  She will need to start the drops on 09/28/19

## 2019-09-24 ENCOUNTER — TELEPHONE (OUTPATIENT)
Dept: OPHTHALMOLOGY | Facility: CLINIC | Age: 79
End: 2019-09-24

## 2019-09-24 DIAGNOSIS — L50.8 URTICARIA, ACUTE: ICD-10-CM

## 2019-09-24 RX ORDER — LEVOCETIRIZINE DIHYDROCHLORIDE 5 MG/1
TABLET, FILM COATED ORAL
Qty: 120 TABLET | Refills: 0 | Status: SHIPPED | OUTPATIENT
Start: 2019-09-24 | End: 2019-10-22 | Stop reason: SDUPTHER

## 2019-09-24 NOTE — TELEPHONE ENCOUNTER
----- Message from Maite Reardon sent at 9/24/2019  9:48 AM CDT -----  Contact: Gabriela Saunders stated she have not received her eye drops yet and her surgery is schedule for Monday 9/30. Pt contact number is [phone]. Pt stated she just wanted Dr. Freeman office to know.

## 2019-09-26 ENCOUNTER — TELEPHONE (OUTPATIENT)
Dept: OPTOMETRY | Facility: CLINIC | Age: 79
End: 2019-09-26

## 2019-09-26 NOTE — TELEPHONE ENCOUNTER
Spoke with patient. Confirmed sx arrival time, which is 6am on 9/30/2019. Nothing to eat or drink after 9 pm the night before sx. May have water, gatorade, or powerade after 9 pm until leaving home the morning of surgery. Pt is to start eyedrops on Saturday, one drop TID into operative eye. TR 9/26/2019

## 2019-09-30 ENCOUNTER — HOSPITAL ENCOUNTER (OUTPATIENT)
Facility: OTHER | Age: 79
Discharge: HOME | End: 2019-09-30
Attending: OPHTHALMOLOGY | Admitting: OPHTHALMOLOGY
Payer: MEDICARE

## 2019-09-30 ENCOUNTER — ANESTHESIA (OUTPATIENT)
Dept: SURGERY | Facility: OTHER | Age: 79
End: 2019-09-30
Payer: MEDICARE

## 2019-09-30 ENCOUNTER — ANESTHESIA EVENT (OUTPATIENT)
Dept: SURGERY | Facility: OTHER | Age: 79
End: 2019-09-30
Payer: MEDICARE

## 2019-09-30 VITALS
HEART RATE: 61 BPM | SYSTOLIC BLOOD PRESSURE: 114 MMHG | TEMPERATURE: 98 F | OXYGEN SATURATION: 96 % | BODY MASS INDEX: 21.85 KG/M2 | RESPIRATION RATE: 16 BRPM | DIASTOLIC BLOOD PRESSURE: 57 MMHG | HEIGHT: 64 IN | WEIGHT: 128 LBS

## 2019-09-30 DIAGNOSIS — Z98.890 POST-OPERATIVE STATE: ICD-10-CM

## 2019-09-30 DIAGNOSIS — H25.12 NUCLEAR SCLEROTIC CATARACT OF LEFT EYE: Primary | ICD-10-CM

## 2019-09-30 DIAGNOSIS — H25.12 NUCLEAR SCLEROSIS OF LEFT EYE: ICD-10-CM

## 2019-09-30 PROCEDURE — 71000015 HC POSTOP RECOV 1ST HR: Performed by: OPHTHALMOLOGY

## 2019-09-30 PROCEDURE — V2632 POST CHMBR INTRAOCULAR LENS: HCPCS | Performed by: OPHTHALMOLOGY

## 2019-09-30 PROCEDURE — 25000003 PHARM REV CODE 250

## 2019-09-30 PROCEDURE — 66984 PR REMOVAL, CATARACT, W/INSRT INTRAOC LENS, W/O ENDO CYCLO: ICD-10-PCS | Mod: 79,LT,, | Performed by: OPHTHALMOLOGY

## 2019-09-30 PROCEDURE — 25000003 PHARM REV CODE 250: Performed by: OPHTHALMOLOGY

## 2019-09-30 PROCEDURE — 63600175 PHARM REV CODE 636 W HCPCS: Performed by: NURSE ANESTHETIST, CERTIFIED REGISTERED

## 2019-09-30 PROCEDURE — 66999 PR FEMTO TORIC: ICD-10-PCS | Mod: CSM,LT,, | Performed by: OPHTHALMOLOGY

## 2019-09-30 PROCEDURE — 66999 UNLISTED PX ANT SEGMENT EYE: CPT | Mod: CSM,LT,, | Performed by: OPHTHALMOLOGY

## 2019-09-30 PROCEDURE — 37000008 HC ANESTHESIA 1ST 15 MINUTES: Performed by: OPHTHALMOLOGY

## 2019-09-30 PROCEDURE — 66984 XCAPSL CTRC RMVL W/O ECP: CPT | Mod: 79,LT,, | Performed by: OPHTHALMOLOGY

## 2019-09-30 PROCEDURE — 37000009 HC ANESTHESIA EA ADD 15 MINS: Performed by: OPHTHALMOLOGY

## 2019-09-30 PROCEDURE — 36000707: Performed by: OPHTHALMOLOGY

## 2019-09-30 PROCEDURE — 36000706: Performed by: OPHTHALMOLOGY

## 2019-09-30 DEVICE — LENS IOL ITEC PRELOAD 23.0D: Type: IMPLANTABLE DEVICE | Site: EYE | Status: FUNCTIONAL

## 2019-09-30 RX ORDER — PHENYLEPHRINE HYDROCHLORIDE 100 MG/ML
1 SOLUTION/ DROPS OPHTHALMIC
Status: DISCONTINUED | OUTPATIENT
Start: 2019-09-30 | End: 2019-09-30 | Stop reason: HOSPADM

## 2019-09-30 RX ORDER — PROPARACAINE HYDROCHLORIDE 5 MG/ML
1 SOLUTION/ DROPS OPHTHALMIC
Status: DISCONTINUED | OUTPATIENT
Start: 2019-09-30 | End: 2019-09-30 | Stop reason: HOSPADM

## 2019-09-30 RX ORDER — TETRACAINE HYDROCHLORIDE 5 MG/ML
1 SOLUTION OPHTHALMIC
Status: COMPLETED | OUTPATIENT
Start: 2019-09-30 | End: 2019-09-30

## 2019-09-30 RX ORDER — PHENYLEPHRINE HYDROCHLORIDE 25 MG/ML
1 SOLUTION/ DROPS OPHTHALMIC
Status: COMPLETED | OUTPATIENT
Start: 2019-09-30 | End: 2019-09-30

## 2019-09-30 RX ORDER — ACETAMINOPHEN 325 MG/1
650 TABLET ORAL EVERY 4 HOURS PRN
Status: DISCONTINUED | OUTPATIENT
Start: 2019-09-30 | End: 2019-09-30 | Stop reason: HOSPADM

## 2019-09-30 RX ORDER — LIDOCAINE HYDROCHLORIDE 40 MG/ML
INJECTION, SOLUTION RETROBULBAR
Status: DISCONTINUED | OUTPATIENT
Start: 2019-09-30 | End: 2019-09-30 | Stop reason: HOSPADM

## 2019-09-30 RX ORDER — MOXIFLOXACIN 5 MG/ML
1 SOLUTION/ DROPS OPHTHALMIC
Status: COMPLETED | OUTPATIENT
Start: 2019-09-30 | End: 2019-09-30

## 2019-09-30 RX ORDER — SODIUM CHLORIDE 9 MG/ML
INJECTION, SOLUTION INTRAVENOUS CONTINUOUS PRN
Status: DISCONTINUED | OUTPATIENT
Start: 2019-09-30 | End: 2019-09-30

## 2019-09-30 RX ORDER — TETRACAINE HYDROCHLORIDE 5 MG/ML
SOLUTION OPHTHALMIC
Status: DISCONTINUED | OUTPATIENT
Start: 2019-09-30 | End: 2019-09-30 | Stop reason: HOSPADM

## 2019-09-30 RX ORDER — MOXIFLOXACIN 5 MG/ML
SOLUTION/ DROPS OPHTHALMIC
Status: DISCONTINUED | OUTPATIENT
Start: 2019-09-30 | End: 2019-09-30 | Stop reason: HOSPADM

## 2019-09-30 RX ORDER — TROPICAMIDE 10 MG/ML
1 SOLUTION/ DROPS OPHTHALMIC
Status: COMPLETED | OUTPATIENT
Start: 2019-09-30 | End: 2019-09-30

## 2019-09-30 RX ORDER — MIDAZOLAM HYDROCHLORIDE 1 MG/ML
INJECTION INTRAMUSCULAR; INTRAVENOUS
Status: DISCONTINUED | OUTPATIENT
Start: 2019-09-30 | End: 2019-09-30

## 2019-09-30 RX ADMIN — PHENYLEPHRINE HYDROCHLORIDE 1 DROP: 25 SOLUTION/ DROPS OPHTHALMIC at 06:09

## 2019-09-30 RX ADMIN — MOXIFLOXACIN HYDROCHLORIDE 1 DROP: 5 SOLUTION/ DROPS OPHTHALMIC at 06:09

## 2019-09-30 RX ADMIN — TETRACAINE HYDROCHLORIDE 1 DROP: 5 SOLUTION OPHTHALMIC at 06:09

## 2019-09-30 RX ADMIN — TROPICAMIDE 1 DROP: 10 SOLUTION/ DROPS OPHTHALMIC at 05:09

## 2019-09-30 RX ADMIN — MOXIFLOXACIN 1 DROP: 5 SOLUTION/ DROPS OPHTHALMIC at 08:09

## 2019-09-30 RX ADMIN — MIDAZOLAM HYDROCHLORIDE 2 MG: 1 INJECTION, SOLUTION INTRAMUSCULAR; INTRAVENOUS at 07:09

## 2019-09-30 RX ADMIN — TETRACAINE HYDROCHLORIDE 1 DROP: 5 SOLUTION OPHTHALMIC at 05:09

## 2019-09-30 RX ADMIN — MOXIFLOXACIN HYDROCHLORIDE 1 DROP: 5 SOLUTION/ DROPS OPHTHALMIC at 05:09

## 2019-09-30 RX ADMIN — TROPICAMIDE 1 DROP: 10 SOLUTION/ DROPS OPHTHALMIC at 06:09

## 2019-09-30 RX ADMIN — PHENYLEPHRINE HYDROCHLORIDE 1 DROP: 25 SOLUTION/ DROPS OPHTHALMIC at 05:09

## 2019-09-30 RX ADMIN — SODIUM CHLORIDE: 9 INJECTION, SOLUTION INTRAVENOUS at 07:09

## 2019-09-30 NOTE — ANESTHESIA PREPROCEDURE EVALUATION
09/30/2019  Gabriela Edwards is a 79 y.o., female.    Pre-op Assessment    I have reviewed the Patient Summary Reports.     I have reviewed the Nursing Notes.   I have reviewed the Medications.     Review of Systems  Anesthesia Hx:  No problems with previous Anesthesia  Denies Family Hx of Anesthesia complications.   Denies Personal Hx of Anesthesia complications.   Social:  Non-Smoker    Cardiovascular:   Exercise tolerance: good    Pulmonary:  Pulmonary Normal    Hepatic/GI:  Hepatic/GI Normal    Neurological:   Seizures, well controlled    Endocrine:  Endocrine Normal    Dermatological:  Skin Normal    Psych:  Psychiatric Normal           Physical Exam  General:  Well nourished    Airway/Jaw/Neck:  Airway Findings: Mouth Opening: Normal Tongue: Normal  General Airway Assessment: Adult  Mallampati: II  TM Distance: Normal, at least 6 cm  Jaw/Neck Findings:     Neck ROM: Normal ROM      Dental:  Dental Findings: In tact             Anesthesia Plan  Type of Anesthesia, risks & benefits discussed:  Anesthesia Type:  MAC  Patient's Preference:   Intra-op Monitoring Plan: standard ASA monitors  Intra-op Monitoring Plan Comments:   Post Op Pain Control Plan: per primary service following discharge from PACU  Post Op Pain Control Plan Comments:   Induction:    Beta Blocker:         Informed Consent: Patient understands risks and agrees with Anesthesia plan.  Questions answered. Anesthesia consent signed with patient.  ASA Score: 3     Day of Surgery Review of History & Physical:    H&P update referred to the surgeon.         Ready For Surgery From Anesthesia Perspective.

## 2019-09-30 NOTE — DISCHARGE INSTRUCTIONS
Gabriela Edwards has met all discharge criteria from Phase II. Vital Signs are stable, ambulating  without difficulty. Discharge instructions given, patient verbalized understanding. Discharged from facility via wheelchair in stable condition.     Home Care Instructions for Eye Surgeries    1. ACTIVITY:   Limit your activity today. Increase activity gradually. You may return to work or school as directed by your physician.    2. DIET:   Drink plenty of fluids. Resume your normal diet unless instructed otherwise.  3. PAIN:   Expect a moderate amount of pain. If a prescription for pain is not sent home with you, you may take your commonly used pain reliever as directed. If this is not sufficient, call your physician. You may resume any other prescription medication unless otherwise directed by your physician.     4. DRESSING:   Keep your dressing dry and intact.  5. COMMENTS:   No driving, operating heavy machinery or signing legal documents for 24 hours.    No bending forward at the waist.   See attached schedule of eye drops.    Discuss any problem with your physician as soon as it arises. Do not Delay.      EMERGENCY- If you are unable to contact your physician, please go to the nearest Emergency Room.

## 2019-09-30 NOTE — PLAN OF CARE
Gabriela Edwards has met all discharge criteria from Phase II. Vital Signs are stable, ambulating  without difficulty. Discharge instructions given, patient verbalized understanding. Discharged from facility via wheelchair in stable condition.

## 2019-09-30 NOTE — OP NOTE
SURGEON:  Maryuri Freeman M.D.    PREOPERATIVE DIAGNOSIS:    Nuclear Sclerotic Cataract Left Eye    POSTOPERATIVE DIAGNOSIS:    Nuclear Sclerotic Cataract Left Eye    PROCEDURES:    Phacoemulsification with  intraocular lens, Left eye (78926)  With Femtosecond LASER assist    DATE OF SURGERY: 09/30/2019    IMPLANT: pcboo 23.0    ANESTHESIA:  MAC with topical Lidocaine    COMPLICATIONS:  None    ESTIMATED BLOOD LOSS: None    SPECIMENS: None    INDICATIONS:    The patient has a history of painless progressive visual loss and  difficulty with activities of daily living secondary to cataract formation.  After a thorough discussion of the risks, benefits, and alternatives to cataract surgery, including, but not limited to, the rare risks of infection, retinal detachment, hemorrhage, need for additional surgery, loss of vision, and even loss of the eye, the patient voices understanding and desires to proceed.    DESCRIPTION OF PROCEDURE:    After verification of consent and marking of the operative eye, the patient was positioned under the femtosecond LASER. Topical anesthetic drops were administered. A surgical timeout was initiated with verification of patient identifiers and the laser surgical plan. The eye was docked securely and the laser portion of the cataract procedure was carried out without complication.  The patient was returned to the pre-operative area to await the intraocular surgical portion of the cataract procedure.  The patients IOL calculations were reviewed, and the lens selection confirmed.   After verification and marking of the proper eye in the preop holding area, the patient was brought to the operating room in supine position where the eye was prepped and draped in standard sterile fashion with 5% Betadine and a lid speculum placed in the eye.   Topical 4% Lidocaine was used in addition to the preoperative anesthesia and the procedure was begun by the creation of a paracentesis incision through  which viscoelastic was used to fill the anterior chamber.  Next, a keratome blade was used to create a triplanar temporal clear corneal incision and a cystotome and Utrata forceps used to fashion a continuous curvilinear capsulorrhexis.  Hydrodissection was carried out using the Quinones hydrodissection cannula and the nucleus was found to be mobile.  Phacoemulsification of the nucleus was carried out using a quick chop technique, and all remaining epinuclear and cortical material was removed.  The eye was then reformed with Viscoelastic and the  intraocular lens was implanted into the capsular bag.  All remaining viscoelastics were removed from the eye and at the end of the case the pupil was round, the lens was well-centered within the capsular bag and all wounds were found to be water tight.  Drops of Vigamox and Pred Forte were instilled and a shield was placed over the eye. The patient will follow up with Dr. Freeman in the morning.

## 2019-09-30 NOTE — DISCHARGE SUMMARY
Outcome: Successful outpatient ophthalmic surgical procedure  Preprinted Instructions given to patient.  Regular diet.  Activity: No restrictions  Meds: see Med Rec  Condition: stable  Follow up: 1 day with Dr Fremean  Disposition: Home  Diagnosis: s/p eye surgery

## 2019-09-30 NOTE — ANESTHESIA POSTPROCEDURE EVALUATION
Anesthesia Post Evaluation    Patient: Gabriela Edwards    Procedure(s) Performed: Procedure(s) (LRB):  EXTRACTION, CATARACT, WITH IOL INSERTION (Left)    Final Anesthesia Type: MAC  Patient location during evaluation: [facility]  Patient participation: Yes- Able to Participate  Level of consciousness: awake and alert and oriented  Post-procedure vital signs: reviewed and stable  Pain management: adequate  Airway patency: patent  PONV status at discharge: No PONV  Anesthetic complications: no      Cardiovascular status: stable, hemodynamically stable and blood pressure returned to baseline  Respiratory status: unassisted, spontaneous ventilation and room air  Hydration status: euvolemic  Follow-up not needed.          Vitals Value Taken Time   /62 9/30/2019  6:04 AM   Temp 36.6 °C (97.8 °F) 9/30/2019  5:57 AM   Pulse 59 9/30/2019  5:57 AM   Resp 16 9/30/2019  5:57 AM   SpO2 99 % 9/30/2019  5:57 AM         No case tracking events are documented in the log.      Pain/Guy Score: No data recorded

## 2019-10-01 ENCOUNTER — OFFICE VISIT (OUTPATIENT)
Dept: OPHTHALMOLOGY | Facility: CLINIC | Age: 79
End: 2019-10-01
Attending: OPHTHALMOLOGY
Payer: MEDICARE

## 2019-10-01 DIAGNOSIS — H25.12 NUCLEAR SCLEROSIS OF LEFT EYE: ICD-10-CM

## 2019-10-01 DIAGNOSIS — Z98.890 POST-OPERATIVE STATE: Primary | ICD-10-CM

## 2019-10-01 PROCEDURE — 99999 PR PBB SHADOW E&M-EST. PATIENT-LVL II: ICD-10-PCS | Mod: PBBFAC,,, | Performed by: OPHTHALMOLOGY

## 2019-10-01 PROCEDURE — 99024 PR POST-OP FOLLOW-UP VISIT: ICD-10-PCS | Mod: POP,,, | Performed by: OPHTHALMOLOGY

## 2019-10-01 PROCEDURE — 99212 OFFICE O/P EST SF 10 MIN: CPT | Mod: PBBFAC | Performed by: OPHTHALMOLOGY

## 2019-10-01 PROCEDURE — 99999 PR PBB SHADOW E&M-EST. PATIENT-LVL II: CPT | Mod: PBBFAC,,, | Performed by: OPHTHALMOLOGY

## 2019-10-01 PROCEDURE — 99024 POSTOP FOLLOW-UP VISIT: CPT | Mod: POP,,, | Performed by: OPHTHALMOLOGY

## 2019-10-01 NOTE — PROGRESS NOTES
HPI     Post-op Evaluation      Additional comments: 1 day check.               Comments     POD 1 CE with IOL     Pt states vision is much better now that she has both eyes done.  Very   happy.    PGB tid OU          Last edited by Melissa Zuniga on 10/1/2019  8:09 AM. (History)            Assessment /Plan     For exam results, see Encounter Report.    Post-operative state    Nuclear sclerosis of left eye      Slit lamp exam:  L/L: nl  K: clear, wound sealed  AC: 1+ cell  Lens: IOL centered and stable    POD1 s/p Phaco/IOL  Appropriate precautions and post op medications reviewed.  Patient instructed to call or come in if symptoms of redness, decreased vision, or pain are experienced.                   
48190 Exp Problem Focused - Mod. Complex

## 2019-10-21 ENCOUNTER — TELEPHONE (OUTPATIENT)
Dept: OPHTHALMOLOGY | Facility: CLINIC | Age: 79
End: 2019-10-21

## 2019-10-21 NOTE — TELEPHONE ENCOUNTER
Sp[eugene to patient because she was concerned of a flickering she noticed after cataract sx, explained to her that it should go away.    ----- Message from Tatyana Rogers sent at 10/21/2019 10:10 AM CDT -----  Contact: Gabriela Saunders called to discuss her current condition past her surgery. [phone]

## 2019-10-22 DIAGNOSIS — L50.8 URTICARIA, ACUTE: ICD-10-CM

## 2019-10-22 RX ORDER — LEVOCETIRIZINE DIHYDROCHLORIDE 5 MG/1
TABLET, FILM COATED ORAL
Qty: 120 TABLET | Refills: 0 | Status: SHIPPED | OUTPATIENT
Start: 2019-10-22 | End: 2019-11-20 | Stop reason: SDUPTHER

## 2019-11-01 ENCOUNTER — OFFICE VISIT (OUTPATIENT)
Dept: OPTOMETRY | Facility: CLINIC | Age: 79
End: 2019-11-01
Payer: MEDICARE

## 2019-11-01 DIAGNOSIS — Z98.41 S/P BILATERAL CATARACT EXTRACTION: Primary | ICD-10-CM

## 2019-11-01 DIAGNOSIS — Z98.42 S/P BILATERAL CATARACT EXTRACTION: Primary | ICD-10-CM

## 2019-11-01 PROCEDURE — 99212 OFFICE O/P EST SF 10 MIN: CPT | Mod: PBBFAC | Performed by: OPTOMETRIST

## 2019-11-01 PROCEDURE — 99024 POSTOP FOLLOW-UP VISIT: CPT | Mod: POP,,, | Performed by: OPTOMETRIST

## 2019-11-01 PROCEDURE — 92134 CPTRZ OPH DX IMG PST SGM RTA: CPT | Mod: PBBFAC | Performed by: OPTOMETRIST

## 2019-11-01 PROCEDURE — 99999 PR PBB SHADOW E&M-EST. PATIENT-LVL II: ICD-10-PCS | Mod: PBBFAC,,, | Performed by: OPTOMETRIST

## 2019-11-01 PROCEDURE — 99024 PR POST-OP FOLLOW-UP VISIT: ICD-10-PCS | Mod: POP,,, | Performed by: OPTOMETRIST

## 2019-11-01 PROCEDURE — 99999 PR PBB SHADOW E&M-EST. PATIENT-LVL II: CPT | Mod: PBBFAC,,, | Performed by: OPTOMETRIST

## 2019-11-01 RX ORDER — ERGOCALCIFEROL 1.25 MG/1
CAPSULE ORAL
Refills: 1 | COMMUNITY
Start: 2019-09-22 | End: 2020-08-06

## 2019-11-01 NOTE — PROGRESS NOTES
HPI     DARI - 05/14/2019  Dr. Freeman   DLS- 10/01/2019 Dr. Freeman      Phaco/IOL OS 09/30/2019 Dr. Freeman   Pt sts doing well but can see a black line in outer peripheral va OS comes   and goes since surgery. Vision improvement can do things around the house   a lot easier without glasses but glasses would help for smaller print.   Denies pain     Finishing combination gtts this weekend TID       Last edited by Mandie Rangel on 11/1/2019  9:04 AM. (History)            Assessment /Plan     For exam results, see Encounter Report.    S/P bilateral cataract extraction  -     OCT- Retina            1.  Pt doing well.  Bifocal rx given.  No CME OU.  Retina flat and intact OU--no holes, tears, breaks, or RDs.  Educated pt seeing reflection from the lens OS--will resolve.    RTC 1 year for routine exam.

## 2019-11-20 DIAGNOSIS — L50.8 URTICARIA, ACUTE: ICD-10-CM

## 2019-11-20 RX ORDER — LEVOCETIRIZINE DIHYDROCHLORIDE 5 MG/1
TABLET, FILM COATED ORAL
Qty: 120 TABLET | Refills: 0 | Status: SHIPPED | OUTPATIENT
Start: 2019-11-20 | End: 2019-12-18 | Stop reason: SDUPTHER

## 2019-12-01 NOTE — TELEPHONE ENCOUNTER
----- Message from Tatyana Rogers sent at 5/22/2019  4:36 PM CDT -----  Contact: Gabriela  Patient Returning Call from [facility]    Who Left Message for Patient:Melissa  Communication Preference:[phone]  Additional Information:     97.6

## 2019-12-18 DIAGNOSIS — L50.8 URTICARIA, ACUTE: ICD-10-CM

## 2019-12-18 RX ORDER — LEVOCETIRIZINE DIHYDROCHLORIDE 5 MG/1
TABLET, FILM COATED ORAL
Qty: 120 TABLET | Refills: 0 | Status: SHIPPED | OUTPATIENT
Start: 2019-12-18 | End: 2020-01-20

## 2020-01-19 DIAGNOSIS — L50.8 URTICARIA, ACUTE: ICD-10-CM

## 2020-01-20 RX ORDER — LEVOCETIRIZINE DIHYDROCHLORIDE 5 MG/1
TABLET, FILM COATED ORAL
Qty: 120 TABLET | Refills: 0 | Status: SHIPPED | OUTPATIENT
Start: 2020-01-20 | End: 2020-02-17

## 2020-02-17 DIAGNOSIS — L50.8 URTICARIA, ACUTE: ICD-10-CM

## 2020-02-17 RX ORDER — LEVOCETIRIZINE DIHYDROCHLORIDE 5 MG/1
TABLET, FILM COATED ORAL
Qty: 120 TABLET | Refills: 0 | Status: SHIPPED | OUTPATIENT
Start: 2020-02-17 | End: 2020-03-17

## 2020-03-17 DIAGNOSIS — L50.8 URTICARIA, ACUTE: ICD-10-CM

## 2020-03-17 RX ORDER — LEVOCETIRIZINE DIHYDROCHLORIDE 5 MG/1
TABLET, FILM COATED ORAL
Qty: 120 TABLET | Refills: 0 | Status: SHIPPED | OUTPATIENT
Start: 2020-03-17 | End: 2020-04-23

## 2020-04-23 DIAGNOSIS — L50.8 URTICARIA, ACUTE: ICD-10-CM

## 2020-04-23 RX ORDER — LEVOCETIRIZINE DIHYDROCHLORIDE 5 MG/1
TABLET, FILM COATED ORAL
Qty: 120 TABLET | Refills: 0 | Status: SHIPPED | OUTPATIENT
Start: 2020-04-23 | End: 2021-05-21 | Stop reason: SDUPTHER

## 2020-07-20 ENCOUNTER — TELEPHONE (OUTPATIENT)
Dept: OPHTHALMOLOGY | Facility: CLINIC | Age: 80
End: 2020-07-20

## 2020-07-20 NOTE — TELEPHONE ENCOUNTER
----- Message from Uzma Aldana sent at 7/20/2020 11:08 AM CDT -----  Regarding: appt  Contact: Pt  Pt called to schedule an appt for another black spot on eye and annual. Pt asked for a call back

## 2020-08-06 ENCOUNTER — OFFICE VISIT (OUTPATIENT)
Dept: ALLERGY | Facility: CLINIC | Age: 80
End: 2020-08-06
Payer: MEDICARE

## 2020-08-06 VITALS — HEIGHT: 64 IN | BODY MASS INDEX: 22.17 KG/M2 | WEIGHT: 129.88 LBS

## 2020-08-06 DIAGNOSIS — L20.9 ATOPIC DERMATITIS, UNSPECIFIED TYPE: Primary | ICD-10-CM

## 2020-08-06 DIAGNOSIS — J30.9 ALLERGIC RHINITIS, UNSPECIFIED SEASONALITY, UNSPECIFIED TRIGGER: ICD-10-CM

## 2020-08-06 PROCEDURE — 99214 PR OFFICE/OUTPT VISIT, EST, LEVL IV, 30-39 MIN: ICD-10-PCS | Mod: S$PBB,,, | Performed by: ALLERGY & IMMUNOLOGY

## 2020-08-06 PROCEDURE — 99214 OFFICE O/P EST MOD 30 MIN: CPT | Mod: S$PBB,,, | Performed by: ALLERGY & IMMUNOLOGY

## 2020-08-06 PROCEDURE — 99999 PR PBB SHADOW E&M-EST. PATIENT-LVL III: ICD-10-PCS | Mod: PBBFAC,,, | Performed by: ALLERGY & IMMUNOLOGY

## 2020-08-06 PROCEDURE — 99999 PR PBB SHADOW E&M-EST. PATIENT-LVL III: CPT | Mod: PBBFAC,,, | Performed by: ALLERGY & IMMUNOLOGY

## 2020-08-06 PROCEDURE — 99213 OFFICE O/P EST LOW 20 MIN: CPT | Mod: PBBFAC | Performed by: ALLERGY & IMMUNOLOGY

## 2020-08-06 RX ORDER — MUPIROCIN 20 MG/G
OINTMENT TOPICAL 2 TIMES DAILY
Qty: 22 G | Refills: 4 | Status: SHIPPED | OUTPATIENT
Start: 2020-08-06 | End: 2021-06-14

## 2020-08-06 RX ORDER — MOMETASONE FUROATE 50 UG/1
SPRAY, METERED NASAL
COMMUNITY
Start: 2020-06-17 | End: 2021-06-14 | Stop reason: SDUPTHER

## 2020-08-06 RX ORDER — ALENDRONATE SODIUM 70 MG/1
TABLET ORAL
COMMUNITY
Start: 2020-07-21 | End: 2021-10-27 | Stop reason: ALTCHOICE

## 2020-08-06 RX ORDER — PREDNISONE 20 MG/1
TABLET ORAL
Qty: 10 TABLET | Refills: 1 | Status: SHIPPED | OUTPATIENT
Start: 2020-08-06 | End: 2021-06-14

## 2020-08-06 NOTE — PROGRESS NOTES
Subjective:      Patient ID: Gabriela Edwards is a 80 y.o. female.    LV 8/12/19    Chief Complaint: Urticaria  fu chronic idiopathic urticaria, recurrent dermatitis      History of Present Illness:   Pt w hx chronic/recurrent idiopathic urticaria for approx 3+ years, as well as more recent dermatitis, poss atopic. Prior to LV in this clinic was on xolair for 4 mo for chronic urticaria. During this time she did not have significant urticaria, but she continued with dermatitis on much of upper body. Possibly contact vs atopic. She does today recall hx atopic dermatitis, frequent pruritus as child and young adult.  About a month after stopping xolair, dermatitis resolved. She was without significant urticaria or dermatitis for about 10 months.   Over last 1-2 months has had fixed dermatitis on most of upper body. No, or minimal, urticaria. Has been taking high dose levocetirizine w only minimal relief from pruritus.    Recalls IT as child, for AR 3 times weekly 5 yrs  No asthma     Additional History:  Pt has seizure disorder, on Keppra x 20 yrs.  No changes.   Patient Active Problem List   Diagnosis    DON (conjunctival intraepithelial neoplasia)    Allergic reaction    Urticaria, idiopathic    Allergic rhinitis    Eczema    Nuclear sclerosis, bilateral    Post-operative state    Nuclear sclerotic cataract of left eye       Review of Systems   Constitutional: Negative for chills, fever and malaise/fatigue.   HENT: Negative for congestion, ear discharge, ear pain, hearing loss, nosebleeds, sinus pain and sore throat.    Eyes: Negative for photophobia, pain, discharge and redness.   Respiratory: Negative for cough, hemoptysis, shortness of breath, wheezing and stridor.    Cardiovascular: Negative for chest pain and palpitations.   Gastrointestinal: Negative for abdominal pain, diarrhea, nausea and vomiting.   Skin: Positive for rash. Negative for itching.   Neurological: Negative for seizures and loss of  consciousness.   Psychiatric/Behavioral: The patient is not nervous/anxious.        Objective:     There were no vitals filed for this visit.    Physical Exam   Constitutional: She is oriented to person, place, and time.   HENT:   Head: Normocephalic and atraumatic.   Right Ear: External ear normal.   Left Ear: External ear normal.   Nose: Nose normal.   Eyes: Conjunctivae are normal. No scleral icterus.   Neck: Neck supple.   Cardiovascular: Normal rate and regular rhythm.   Pulmonary/Chest: Effort normal. No stridor. No respiratory distress.   Abdominal: Soft. She exhibits no distension.   Musculoskeletal:         General: No edema.   Neurological: She is alert and oriented to person, place, and time.   Skin: There is erythema (erythema of trunk, neck, face. + xerosis).   Psychiatric: Memory and judgment normal.             Assessment:     1. Dermatitis, suspect atopic   2. Allergic rhinitis       Plan:       freq use aquaphor, vaseline to keep affected areas moisturized  Prn TCN 0.1% cream to affected areas on neck, chest, shoulders   If no improvement, prednisone taper  Discussed aggressive routine moisturization  Ok continue high dose non-sedating antihistamines  Prn bactroban  Consider 1-2 times weekly dilute bleach baths  Cbc,IGE, immunoCAPs  If poor response to above, may consider dupilumab later    Answers for HPI/ROS submitted by the patient on 8/6/2020   facial swelling: Yes  sinus pressure : No  postnasal drip: No  sneezing: No  runny nose: No  trouble swallowing: No  voice change: No  eye itching: Yes  apnea: No  choking: No  chest tightness: No  color change : Yes

## 2020-08-25 ENCOUNTER — OFFICE VISIT (OUTPATIENT)
Dept: OPHTHALMOLOGY | Facility: CLINIC | Age: 80
End: 2020-08-25
Attending: OPHTHALMOLOGY
Payer: MEDICARE

## 2020-08-25 DIAGNOSIS — H10.13 ALLERGIC CONJUNCTIVITIS OF BOTH EYES: Primary | ICD-10-CM

## 2020-08-25 DIAGNOSIS — Z96.1 PSEUDOPHAKIA OF BOTH EYES: ICD-10-CM

## 2020-08-25 PROCEDURE — 92014 COMPRE OPH EXAM EST PT 1/>: CPT | Mod: S$PBB,,, | Performed by: OPHTHALMOLOGY

## 2020-08-25 PROCEDURE — 99213 OFFICE O/P EST LOW 20 MIN: CPT | Mod: PBBFAC | Performed by: OPHTHALMOLOGY

## 2020-08-25 PROCEDURE — 99999 PR PBB SHADOW E&M-EST. PATIENT-LVL III: ICD-10-PCS | Mod: PBBFAC,,, | Performed by: OPHTHALMOLOGY

## 2020-08-25 PROCEDURE — 99999 PR PBB SHADOW E&M-EST. PATIENT-LVL III: CPT | Mod: PBBFAC,,, | Performed by: OPHTHALMOLOGY

## 2020-08-25 PROCEDURE — 92014 PR EYE EXAM, EST PATIENT,COMPREHESV: ICD-10-PCS | Mod: S$PBB,,, | Performed by: OPHTHALMOLOGY

## 2020-08-25 RX ORDER — OLOPATADINE HYDROCHLORIDE 1 MG/ML
1 SOLUTION/ DROPS OPHTHALMIC 2 TIMES DAILY
Qty: 5 ML | Refills: 1 | Status: SHIPPED | OUTPATIENT
Start: 2020-08-25 | End: 2021-06-14

## 2020-08-25 NOTE — PROGRESS NOTES
"HPI     Black spots OU      Additional comments: like the one previously removed from left eye               Comments     Yearly follow up     Ocular Hx  Cataract ext with IOL OU Dr Freeman 2019  DON OS 10/15/2020    Here today for yearly follow up , pt reports "may have new spots in both   eye " but  Not sure   C/O eyes ithing , gtts not helping     Gtts  Allegry gtts QD OU   AT's QD OU           Last edited by Karli Alonso on 8/25/2020 10:48 AM. (History)            Assessment /Plan     For exam results, see Encounter Report.    Allergic conjunctivitis of both eyes    Pseudophakia of both eyes    Other orders  -     olopatadine (PATANOL) 0.1 % ophthalmic solution; Place 1 drop into both eyes 2 (two) times daily.  Dispense: 5 mL; Refill: 1      The above diagnosis was explained to the patient and treatment initiated as prescribed.  All questions were answered and the patient is instructed to follow up if symptoms do not resolve or worsen.  PCIOLs stable and normal exam                 "

## 2020-09-03 ENCOUNTER — OFFICE VISIT (OUTPATIENT)
Dept: ALLERGY | Facility: CLINIC | Age: 80
End: 2020-09-03
Payer: MEDICARE

## 2020-09-03 VITALS — HEIGHT: 64 IN | BODY MASS INDEX: 22.43 KG/M2 | WEIGHT: 131.38 LBS

## 2020-09-03 DIAGNOSIS — L20.9 ATOPIC DERMATITIS, UNSPECIFIED TYPE: Primary | ICD-10-CM

## 2020-09-03 PROCEDURE — 99999 PR PBB SHADOW E&M-EST. PATIENT-LVL III: ICD-10-PCS | Mod: PBBFAC,,, | Performed by: ALLERGY & IMMUNOLOGY

## 2020-09-03 PROCEDURE — 99214 OFFICE O/P EST MOD 30 MIN: CPT | Mod: S$PBB,,, | Performed by: ALLERGY & IMMUNOLOGY

## 2020-09-03 PROCEDURE — 99214 PR OFFICE/OUTPT VISIT, EST, LEVL IV, 30-39 MIN: ICD-10-PCS | Mod: S$PBB,,, | Performed by: ALLERGY & IMMUNOLOGY

## 2020-09-03 PROCEDURE — 99999 PR PBB SHADOW E&M-EST. PATIENT-LVL III: CPT | Mod: PBBFAC,,, | Performed by: ALLERGY & IMMUNOLOGY

## 2020-09-03 PROCEDURE — 99213 OFFICE O/P EST LOW 20 MIN: CPT | Mod: PBBFAC | Performed by: ALLERGY & IMMUNOLOGY

## 2020-09-03 NOTE — PROGRESS NOTES
Subjective:      Patient ID: Gabriela Edwards is a 80 y.o. female.     8/6/2020    Chief Complaint: Follow-up (review labs)  fu atopic dermatitis      History of Present Illness:   Pt w hx chronic/recurrent idiopathic urticaria for approx 3+ years, as well as more recent atopic dermatitis.  Urticaria responded well to omalizumab.  Over the ~4 months though, pt has been affected more by chronic, recurrent atopic dermatitis rather than urticaria. Face, trunk and arms are most commonly affected. Poor response to topical steroids. Has needed multiple courses oral steroids for relief. Sleep often disturbed. No relief from pruritus w high dose antihistamines.    Recalls IT as child, for AR 3 times weekly 5 yrs  No asthma     Additional History:  Pt has seizure disorder, on Keppra x 20 yrs.  No changes.   Patient Active Problem List   Diagnosis    DON (conjunctival intraepithelial neoplasia)    Allergic reaction    Urticaria, idiopathic    Allergic rhinitis    Eczema    Nuclear sclerosis, bilateral    Post-operative state    Nuclear sclerotic cataract of left eye       Review of Systems   Constitutional: Negative for chills, fever and malaise/fatigue.   HENT: Negative for congestion, ear discharge, ear pain, hearing loss, nosebleeds, sinus pain and sore throat.    Eyes: Negative for photophobia, pain, discharge and redness.        Itching   Respiratory: Negative for cough, hemoptysis, shortness of breath, wheezing and stridor.    Cardiovascular: Negative for chest pain and palpitations.   Gastrointestinal: Negative for abdominal pain, diarrhea, nausea and vomiting.   Skin: Positive for rash. Negative for itching.   Neurological: Negative for seizures and loss of consciousness.   Psychiatric/Behavioral: The patient is not nervous/anxious.        Objective:     There were no vitals filed for this visit.    Physical Exam   Constitutional: She is oriented to person, place, and time.   HENT:   Head:  Normocephalic and atraumatic.   Right Ear: External ear normal.   Left Ear: External ear normal.   Nose: Nose normal.   Eyes: Conjunctivae are normal. No scleral icterus.   Neck: Neck supple.   Cardiovascular: Normal rate and regular rhythm.   Pulmonary/Chest: Effort normal. No stridor. No respiratory distress.   Abdominal: Soft. She exhibits no distension.   Musculoskeletal:         General: No edema.   Neurological: She is alert and oriented to person, place, and time.   Skin: There is erythema (erythema of trunk, neck, face. + xerosis).   Psychiatric: Memory and judgment normal.         Results for ALFONSO DAMIAN (MRN [id number]) as of 9/9/2020 09:26   Ref. Range 8/6/2020 12:50   A. fumigatus Class Unknown CLASS 0   Altern. alternata Class Unknown CLASS 0   Alternaria alternata Latest Ref Range: <0.10 kU/L <0.10   Aspergillus Fumigatus IgE Latest Ref Range: <0.10 kU/L <0.10   Bahia Class Unknown CLASS 0   BAHIA GRASS Latest Ref Range: <0.10 kU/L <0.10   Cat Dander Latest Ref Range: <0.10 kU/L <0.10   Cat Epithelium Class Unknown CLASS 0   Wilkeson Class Unknown CLASS 0   [place]on IgE Latest Ref Range: <0.10 kU/L <0.10   Cockroach, IgE Unknown CLASS 1   D. farinae Latest Ref Range: <0.10 kU/L >100.00 (H)   D. farinae Class Unknown CLASS 6   D. pteronyssinus Class Unknown CLASS 6   Dog Dander, IgE Latest Ref Range: <0.10 kU/L 0.67 (H)   Dog Dander Class Unknown CLASS 1   Marshelder Class Unknown CLASS 0/1   Marshelder IgE Latest Ref Range: <0.10 kU/L 0.26 (H)   Mite Dust Pteronyssinus IgE Latest Ref Range: <0.10 kU/L >100.00 (H)   Oak, Class Unknown CLASS 0   Pecan Doddridge Tree Latest Ref Range: <0.10 kU/L <0.10   Pecan, Class Unknown CLASS 0   Ragweed, Short, Class Unknown CLASS 0   Ragweed, Short, IgE Latest Ref Range: <0.10 kU/L <0.10   Jose Grass Latest Ref Range: <0.10 kU/L <0.10   Jose Grass Class Unknown CLASS 0   White Oak(Quercus alba) IgE Latest Ref Range: <0.10 kU/L <0.10   Results for NATI,  ALFONSO FLORES (MRN [id number]) as of 9/9/2020 09:26   Ref. Range 8/6/2020 12:55   IgE Latest Ref Range: 0 - 100 IU/mL 586 (H)       Assessment:     1. Atopic dermatitis, severe   2. Allergic rhinitis       Plan:       freq use aquaphor, vaseline to keep affected areas moisturized  Prn TCN 0.1% cream to affected areas on neck, chest, shoulders   aggressive routine moisturization  high dose non-sedating antihistamines  Prn bactroban  Consider 1-2 times weekly dilute bleach baths    Will start dupilumab/dupixent. 600 mg sq x 1, then 300 mg sq q 2 weeks starting 14 d after initial dose.

## 2020-09-03 NOTE — Clinical Note
LIZ Bills, Ms. Edwards is a new Dupixent pt (previously on xolair). I sent 2 rx to [facility], one for loading, 600mg dose, and another for 300 mg q 14 d.  LM

## 2020-09-09 ENCOUNTER — TELEPHONE (OUTPATIENT)
Dept: PHARMACY | Facility: CLINIC | Age: 80
End: 2020-09-09

## 2020-09-09 RX ORDER — DUPILUMAB 300 MG/2ML
300 INJECTION, SOLUTION SUBCUTANEOUS
Qty: 4 ML | Refills: 11 | Status: SHIPPED | OUTPATIENT
Start: 2020-09-09 | End: 2021-09-11 | Stop reason: SDUPTHER

## 2020-09-09 RX ORDER — DUPILUMAB 300 MG/2ML
600 INJECTION, SOLUTION SUBCUTANEOUS ONCE
Qty: 4 ML | Refills: 0 | Status: SHIPPED | OUTPATIENT
Start: 2020-09-09 | End: 2020-09-29

## 2020-09-15 NOTE — TELEPHONE ENCOUNTER
DOCUMENTATION ONLY:  Prior authorization for Dupixent approved from 9/10/2020 to 3/9/2021.     Case ID# [id number]    Co-pay: $80    Patient Assistance IS required and being researched    Forward to patient assistance for review.  FLC

## 2020-09-15 NOTE — TELEPHONE ENCOUNTER
Submitted prior authorization VIA [facility] for DUPIXENT LOADING AND MAINTENANCE to insurance company for review on 9/10/2020 [facility]

## 2020-09-28 ENCOUNTER — TELEPHONE (OUTPATIENT)
Dept: PHARMACY | Facility: CLINIC | Age: 80
End: 2020-09-28

## 2020-09-28 NOTE — TELEPHONE ENCOUNTER
Full Dupixent consult declined. Dupixent will be shipped on  to arrive at patient's home on  via [employer]. $ 80 copay. Patient intends to start Dupixent on . Address confirmed, CC on file. Confirmed 2 patient identifiers - name and . Therapy Appropriate. Reviewed dosage, storage, administration, and injection technique. All questions answered and addressed to patients satisfaction.  [facility] to contact patient in 3 weeks for refills.

## 2020-10-05 ENCOUNTER — TELEPHONE (OUTPATIENT)
Dept: ALLERGY | Facility: CLINIC | Age: 80
End: 2020-10-05

## 2020-10-05 RX ORDER — FLUOROMETHOLONE 1 MG/ML
1 SUSPENSION/ DROPS OPHTHALMIC 4 TIMES DAILY
Qty: 5 ML | Refills: 0 | Status: SHIPPED | OUTPATIENT
Start: 2020-10-05 | End: 2020-10-15

## 2020-10-05 NOTE — TELEPHONE ENCOUNTER
"Patient had questions regarding "how the refills work for dupixent." Notified patient that [facility] will call her regarding all refills.    We also confirmed her next dose will be  300 mg sq q 2 weeks starting 14 d after initial dose.  "

## 2020-10-05 NOTE — TELEPHONE ENCOUNTER
----- Message from Jennifer Thomas sent at 10/5/2020  9:52 AM CDT -----  Regarding: pt advice  Pt is calling to speak to someone in office regarding dupixent. Please give pt a call back at [phone]

## 2020-10-05 NOTE — TELEPHONE ENCOUNTER
Spoke to patient Dr. Freeman is sending in FML to start, notified her that unfortunately is a side affect of using Dupixent.

## 2020-10-05 NOTE — TELEPHONE ENCOUNTER
----- Message from Michelle Steiner sent at 10/5/2020  9:07 AM CDT -----  Contact: Pt @ [phone]  Pt states the script for olopatadine (PATANOL) 0.1 % ophthalmic solution isn't helping the itching and now she is experiencing a film over her eyes in the morning. She is wanting to know if the dr can prescribe her another drop? Pt states she is also taking dupilumab (DUPIXENT SYRINGE) 300 mg/2 mL Syrg now and wanting the dr to know because her allergies are really bad. It's not normal season allergies.       [id number] - [place], [place] - [address] AT [facility] & [address] [facility] [id number]  Phone: [phone] Fax: [phone]  Not a 24 hour pharmacy; exact hours not known.

## 2020-10-08 ENCOUNTER — TELEPHONE (OUTPATIENT)
Dept: PHARMACY | Facility: CLINIC | Age: 80
End: 2020-10-08

## 2020-11-09 ENCOUNTER — TELEPHONE (OUTPATIENT)
Dept: PHARMACY | Facility: CLINIC | Age: 80
End: 2020-11-09

## 2020-12-04 ENCOUNTER — SPECIALTY PHARMACY (OUTPATIENT)
Dept: PHARMACY | Facility: CLINIC | Age: 80
End: 2020-12-04

## 2020-12-04 NOTE — TELEPHONE ENCOUNTER
Specialty Pharmacy - Refill Coordination    Specialty Medication Orders Linked to Encounter      Most Recent Value   Medication #1  dupilumab (DUPIXENT SYRINGE) 300 mg/2 mL Syrg (Order#[id number], Rx#[id number])          Refill Questions - Documented Responses      Most Recent Value   Relationship to patient of person spoken to?  Self   HIPAA/medical authority confirmed?  Yes   Any changes in contact preferences or allowed representatives?  No   Has the patient had any insurance changes?  No   Has the patient had any changes to specialty medication, dose, or instructions?  No   Has the patient started taking any new medications, herbals, or supplements?  No   Has the patient been diagnosed with any new medical conditions?  No   Does the patient have any new allergies to medications or foods?  No   Does the patient have any concerns about side effects?  No   Can the patient store medication/sharps container properly (at the correct temperature, away from children/pets, etc.)?  Yes   Can the patient call emergency services (911) in the event of an emergency?  Yes   Does the patient have any concerns or questions about taking or administering this medication as prescribed?  No   How many doses did the patient miss in the past 4 weeks or since the last fill?  0   How many doses does the patient have on hand?  0   How many days does the patient report on hand quantity will last?  0   Does the number of doses/days supply remaining match pharmacy expected amounts?  Yes   Does the patient feel that this medication is effective?  Yes   During the past 4 weeks, has patient missed any activities due to condition or medication?  No   During the past 4 weeks, did patient have any of the following urgent care visits?  None   How will the patient receive the medication?  Mail   Expected Copay ($)  80   Is the patient able to afford the medication copay?  Yes   Payment Method  CC on file   Days supply of Refill  28   Would patient  like to speak to a pharmacist?  No   Do you want to trigger an intervention?  No   Do you want to trigger an additional referral task?  No   Refill activity completed?  Yes   Refill activity plan  Refill scheduled   Shipment/Pickup Date:  12/07/20          Current Outpatient Medications   Medication Sig    alendronate (FOSAMAX) 70 MG tablet TK 1 T PO ONCE A WEEK WITH GLASS OF WATER AND STAY UPRIGHT FOR 30 MINUTES. DO NOT EAT FOR 30 MIN    dupilumab (DUPIXENT SYRINGE) 300 mg/2 mL Syrg Inject 2 mLs (300 mg total) into the skin every 14 (fourteen) days. Start 14 days after initial 600mg dose    levetiracetam (KEPPRA XR) 500 mg Tb24 Take 500 mg by mouth every evening. Pt takes 5 tablets nightly for a total of 2500mg    levocetirizine (XYZAL) 5 MG tablet TAKE 2 TABLETS BY MOUTH TWICE DAILY FOR CHRONIC URTICARIA    mometasone (NASONEX) 50 mcg/actuation nasal spray     mupirocin (BACTROBAN) 2 % ointment Apply topically 2 (two) times daily. As needed for minor skin excoriations    olopatadine (PATANOL) 0.1 % ophthalmic solution Place 1 drop into both eyes 2 (two) times daily. (Patient not taking: Reported on 9/3/2020)    predniSONE (DELTASONE) 20 MG tablet Take 2 tablets daily x 3 days and 1 tab daily x 4 days (Patient not taking: Reported on 9/3/2020)   Last reviewed on 9/3/2020 10:19 AM by Elizabeth A Bosworth, LPN    Review of patient's allergies indicates:  No Known Allergies Last reviewed on  9/9/2020 9:37 AM by Claude Kahn      Tasks added this encounter   No tasks added.   Tasks due within next 3 months   12/2/2020 - Refill Call  12/20/2020 - Clinical - Follow Up Assesement (90 day)     Judy Celeste  [facility] - Special[facility] Pharm[facility]  [address] [place] [id number]  Phone: [phone]  Fax: [phone]

## 2020-12-31 ENCOUNTER — SPECIALTY PHARMACY (OUTPATIENT)
Dept: PHARMACY | Facility: CLINIC | Age: 80
End: 2020-12-31

## 2020-12-31 NOTE — TELEPHONE ENCOUNTER
Specialty Pharmacy - Refill Coordination    Specialty Medication Orders Linked to Encounter      Most Recent Value   Medication #1  dupilumab (DUPIXENT SYRINGE) 300 mg/2 mL Syrg (Order#[id number], Rx#[id number])          Refill Questions - Documented Responses      Most Recent Value   Relationship to patient of person spoken to?  Self   HIPAA/medical authority confirmed?  Yes   How will the patient receive the medication?  Mail   When does the patient need to receive the medication?  01/06/21   Shipping Address  Home   Address in [facility] confirmed and updated if neccessary?  Yes   Expected Copay ($)  62.24   Is the patient able to afford the medication copay?  Yes   Payment Method  new CC added to file   Days supply of Refill  28   Would patient like to speak to a pharmacist?  No   Do you want to trigger an additional referral task?  No   Refill activity completed?  Yes   Refill activity plan  Refill scheduled   Shipment/Pickup Date:  01/04/21          Current Outpatient Medications   Medication Sig    alendronate (FOSAMAX) 70 MG tablet TK 1 T PO ONCE A WEEK WITH GLASS OF WATER AND STAY UPRIGHT FOR 30 MINUTES. DO NOT EAT FOR 30 MIN    dupilumab (DUPIXENT SYRINGE) 300 mg/2 mL Syrg Inject 2 mLs (300 mg total) into the skin every 14 (fourteen) days. Start 14 days after initial 600mg dose    levetiracetam (KEPPRA XR) 500 mg Tb24 Take 500 mg by mouth every evening. Pt takes 5 tablets nightly for a total of 2500mg    levocetirizine (XYZAL) 5 MG tablet TAKE 2 TABLETS BY MOUTH TWICE DAILY FOR CHRONIC URTICARIA    mometasone (NASONEX) 50 mcg/actuation nasal spray     mupirocin (BACTROBAN) 2 % ointment Apply topically 2 (two) times daily. As needed for minor skin excoriations    olopatadine (PATANOL) 0.1 % ophthalmic solution Place 1 drop into both eyes 2 (two) times daily. (Patient not taking: Reported on 9/3/2020)    predniSONE (DELTASONE) 20 MG tablet Take 2 tablets daily x 3 days and 1 tab daily x 4 days  (Patient not taking: Reported on 9/3/2020)   Last reviewed on 9/3/2020 10:19 AM by Elizabeth A Bosworth, LPN    Review of patient's allergies indicates:  No Known Allergies Last reviewed on  9/9/2020 9:37 AM by Claude Kahn      Tasks added this encounter   1/23/2021 - Refill Call (Auto Added)   Tasks due within next 3 months   No tasks due.     [facility]  [address] [place] [id number]  Phone: [phone]  Fax: [phone]

## 2021-01-03 ENCOUNTER — PATIENT MESSAGE (OUTPATIENT)
Dept: ADMINISTRATIVE | Facility: OTHER | Age: 81
End: 2021-01-03

## 2021-01-15 ENCOUNTER — IMMUNIZATION (OUTPATIENT)
Dept: OBSTETRICS AND GYNECOLOGY | Facility: CLINIC | Age: 81
End: 2021-01-15
Payer: MEDICARE

## 2021-01-15 DIAGNOSIS — Z23 NEED FOR VACCINATION: Primary | ICD-10-CM

## 2021-01-15 PROCEDURE — 91300 COVID-19, MRNA, LNP-S, PF, 30 MCG/0.3 ML DOSE VACCINE: CPT | Mod: PBBFAC | Performed by: FAMILY MEDICINE

## 2021-01-28 ENCOUNTER — PATIENT MESSAGE (OUTPATIENT)
Dept: PHARMACY | Facility: CLINIC | Age: 81
End: 2021-01-28

## 2021-01-28 ENCOUNTER — SPECIALTY PHARMACY (OUTPATIENT)
Dept: PHARMACY | Facility: CLINIC | Age: 81
End: 2021-01-28

## 2021-02-05 ENCOUNTER — IMMUNIZATION (OUTPATIENT)
Dept: OBSTETRICS AND GYNECOLOGY | Facility: CLINIC | Age: 81
End: 2021-02-05
Payer: MEDICARE

## 2021-02-05 DIAGNOSIS — Z23 NEED FOR VACCINATION: Primary | ICD-10-CM

## 2021-02-05 PROCEDURE — 91300 COVID-19, MRNA, LNP-S, PF, 30 MCG/0.3 ML DOSE VACCINE: CPT | Mod: PBBFAC | Performed by: NURSE PRACTITIONER

## 2021-02-05 PROCEDURE — 0002A COVID-19, MRNA, LNP-S, PF, 30 MCG/0.3 ML DOSE VACCINE: CPT | Mod: PBBFAC | Performed by: NURSE PRACTITIONER

## 2021-02-23 ENCOUNTER — PATIENT MESSAGE (OUTPATIENT)
Dept: PHARMACY | Facility: CLINIC | Age: 81
End: 2021-02-23

## 2021-02-23 ENCOUNTER — SPECIALTY PHARMACY (OUTPATIENT)
Dept: PHARMACY | Facility: CLINIC | Age: 81
End: 2021-02-23

## 2021-03-18 ENCOUNTER — PATIENT MESSAGE (OUTPATIENT)
Dept: RESEARCH | Facility: HOSPITAL | Age: 81
End: 2021-03-18

## 2021-03-23 ENCOUNTER — SPECIALTY PHARMACY (OUTPATIENT)
Dept: PHARMACY | Facility: CLINIC | Age: 81
End: 2021-03-23

## 2021-03-26 ENCOUNTER — PATIENT MESSAGE (OUTPATIENT)
Dept: RESEARCH | Facility: HOSPITAL | Age: 81
End: 2021-03-26

## 2021-04-19 ENCOUNTER — SPECIALTY PHARMACY (OUTPATIENT)
Dept: PHARMACY | Facility: CLINIC | Age: 81
End: 2021-04-19

## 2021-05-13 ENCOUNTER — PATIENT MESSAGE (OUTPATIENT)
Dept: INTERNAL MEDICINE | Facility: CLINIC | Age: 81
End: 2021-05-13

## 2021-05-19 ENCOUNTER — SPECIALTY PHARMACY (OUTPATIENT)
Dept: PHARMACY | Facility: CLINIC | Age: 81
End: 2021-05-19

## 2021-05-21 DIAGNOSIS — L50.8 URTICARIA, ACUTE: ICD-10-CM

## 2021-05-26 DIAGNOSIS — L50.8 URTICARIA, ACUTE: ICD-10-CM

## 2021-05-26 RX ORDER — LEVOCETIRIZINE DIHYDROCHLORIDE 5 MG/1
10 TABLET, FILM COATED ORAL 2 TIMES DAILY
Qty: 120 TABLET | Refills: 6 | Status: SHIPPED | OUTPATIENT
Start: 2021-05-26 | End: 2021-05-26 | Stop reason: SDUPTHER

## 2021-05-31 RX ORDER — LEVOCETIRIZINE DIHYDROCHLORIDE 5 MG/1
10 TABLET, FILM COATED ORAL 2 TIMES DAILY
Qty: 120 TABLET | Refills: 6 | Status: SHIPPED | OUTPATIENT
Start: 2021-05-31 | End: 2022-05-31

## 2021-06-14 ENCOUNTER — LAB VISIT (OUTPATIENT)
Dept: LAB | Facility: HOSPITAL | Age: 81
End: 2021-06-14
Attending: INTERNAL MEDICINE
Payer: MEDICARE

## 2021-06-14 ENCOUNTER — CLINICAL SUPPORT (OUTPATIENT)
Dept: INTERNAL MEDICINE | Facility: CLINIC | Age: 81
End: 2021-06-14
Payer: MEDICARE

## 2021-06-14 ENCOUNTER — RESEARCH ENCOUNTER (OUTPATIENT)
Dept: RESEARCH | Facility: HOSPITAL | Age: 81
End: 2021-06-14

## 2021-06-14 ENCOUNTER — OFFICE VISIT (OUTPATIENT)
Dept: INTERNAL MEDICINE | Facility: CLINIC | Age: 81
End: 2021-06-14
Payer: MEDICARE

## 2021-06-14 VITALS
DIASTOLIC BLOOD PRESSURE: 82 MMHG | SYSTOLIC BLOOD PRESSURE: 130 MMHG | HEART RATE: 62 BPM | OXYGEN SATURATION: 98 % | WEIGHT: 125.88 LBS | BODY MASS INDEX: 21.49 KG/M2 | HEIGHT: 64 IN

## 2021-06-14 DIAGNOSIS — Z86.19 HISTORY OF HEPATITIS C: ICD-10-CM

## 2021-06-14 DIAGNOSIS — J30.2 SEASONAL ALLERGIES: Primary | ICD-10-CM

## 2021-06-14 DIAGNOSIS — G40.309 NONINTRACTABLE GENERALIZED IDIOPATHIC EPILEPSY WITHOUT STATUS EPILEPTICUS: ICD-10-CM

## 2021-06-14 DIAGNOSIS — L50.1 URTICARIA, IDIOPATHIC: ICD-10-CM

## 2021-06-14 DIAGNOSIS — M81.0 AGE-RELATED OSTEOPOROSIS WITHOUT CURRENT PATHOLOGICAL FRACTURE: ICD-10-CM

## 2021-06-14 DIAGNOSIS — Z23 IMMUNIZATION DUE: ICD-10-CM

## 2021-06-14 PROBLEM — H25.13 NUCLEAR SCLEROSIS, BILATERAL: Status: RESOLVED | Noted: 2019-08-19 | Resolved: 2021-06-14

## 2021-06-14 LAB
ALBUMIN SERPL BCP-MCNC: 3.9 G/DL (ref 3.5–5.2)
ALP SERPL-CCNC: 45 U/L (ref 55–135)
ALT SERPL W/O P-5'-P-CCNC: 13 U/L (ref 10–44)
ANION GAP SERPL CALC-SCNC: 10 MMOL/L (ref 8–16)
AST SERPL-CCNC: 22 U/L (ref 10–40)
BASOPHILS # BLD AUTO: 0.02 K/UL (ref 0–0.2)
BASOPHILS NFR BLD: 0.4 % (ref 0–1.9)
BILIRUB SERPL-MCNC: 0.7 MG/DL (ref 0.1–1)
BUN SERPL-MCNC: 11 MG/DL (ref 8–23)
CALCIUM SERPL-MCNC: 9.7 MG/DL (ref 8.7–10.5)
CHLORIDE SERPL-SCNC: 104 MMOL/L (ref 95–110)
CO2 SERPL-SCNC: 23 MMOL/L (ref 23–29)
CREAT SERPL-MCNC: 0.8 MG/DL (ref 0.5–1.4)
DIFFERENTIAL METHOD: NORMAL
EOSINOPHIL # BLD AUTO: 0.3 K/UL (ref 0–0.5)
EOSINOPHIL NFR BLD: 6.6 % (ref 0–8)
ERYTHROCYTE [DISTWIDTH] IN BLOOD BY AUTOMATED COUNT: 13.2 % (ref 11.5–14.5)
EST. GFR  (AFRICAN AMERICAN): >60 ML/MIN/1.73 M^2
EST. GFR  (NON AFRICAN AMERICAN): >60 ML/MIN/1.73 M^2
GLUCOSE SERPL-MCNC: 94 MG/DL (ref 70–110)
HCT VFR BLD AUTO: 38 % (ref 37–48.5)
HGB BLD-MCNC: 12.3 G/DL (ref 12–16)
IMM GRANULOCYTES # BLD AUTO: 0.01 K/UL (ref 0–0.04)
IMM GRANULOCYTES NFR BLD AUTO: 0.2 % (ref 0–0.5)
LYMPHOCYTES # BLD AUTO: 1.7 K/UL (ref 1–4.8)
LYMPHOCYTES NFR BLD: 34 % (ref 18–48)
MCH RBC QN AUTO: 29.7 PG (ref 27–31)
MCHC RBC AUTO-ENTMCNC: 32.4 G/DL (ref 32–36)
MCV RBC AUTO: 92 FL (ref 82–98)
MONOCYTES # BLD AUTO: 0.5 K/UL (ref 0.3–1)
MONOCYTES NFR BLD: 10.5 % (ref 4–15)
NEUTROPHILS # BLD AUTO: 2.4 K/UL (ref 1.8–7.7)
NEUTROPHILS NFR BLD: 48.3 % (ref 38–73)
NRBC BLD-RTO: 0 /100 WBC
PLATELET # BLD AUTO: 198 K/UL (ref 150–450)
PMV BLD AUTO: 10.5 FL (ref 9.2–12.9)
POTASSIUM SERPL-SCNC: 4.5 MMOL/L (ref 3.5–5.1)
PROT SERPL-MCNC: 7 G/DL (ref 6–8.4)
RBC # BLD AUTO: 4.14 M/UL (ref 4–5.4)
SODIUM SERPL-SCNC: 137 MMOL/L (ref 136–145)
WBC # BLD AUTO: 4.88 K/UL (ref 3.9–12.7)

## 2021-06-14 PROCEDURE — 36415 COLL VENOUS BLD VENIPUNCTURE: CPT | Performed by: INTERNAL MEDICINE

## 2021-06-14 PROCEDURE — G0009 ADMIN PNEUMOCOCCAL VACCINE: HCPCS | Mod: PBBFAC

## 2021-06-14 PROCEDURE — 99204 PR OFFICE/OUTPT VISIT, NEW, LEVL IV, 45-59 MIN: ICD-10-PCS | Mod: S$PBB,,, | Performed by: INTERNAL MEDICINE

## 2021-06-14 PROCEDURE — 90715 TDAP VACCINE 7 YRS/> IM: CPT | Mod: PBBFAC

## 2021-06-14 PROCEDURE — 99214 OFFICE O/P EST MOD 30 MIN: CPT | Mod: PBBFAC,25 | Performed by: INTERNAL MEDICINE

## 2021-06-14 PROCEDURE — 90732 PPSV23 VACC 2 YRS+ SUBQ/IM: CPT | Mod: PBBFAC

## 2021-06-14 PROCEDURE — 99999 PR PBB SHADOW E&M-EST. PATIENT-LVL IV: CPT | Mod: PBBFAC,,, | Performed by: INTERNAL MEDICINE

## 2021-06-14 PROCEDURE — 85025 COMPLETE CBC W/AUTO DIFF WBC: CPT | Performed by: INTERNAL MEDICINE

## 2021-06-14 PROCEDURE — 99204 OFFICE O/P NEW MOD 45 MIN: CPT | Mod: S$PBB,,, | Performed by: INTERNAL MEDICINE

## 2021-06-14 PROCEDURE — 80053 COMPREHEN METABOLIC PANEL: CPT | Performed by: INTERNAL MEDICINE

## 2021-06-14 PROCEDURE — 90472 IMMUNIZATION ADMIN EACH ADD: CPT | Mod: PBBFAC

## 2021-06-14 PROCEDURE — 99999 PR PBB SHADOW E&M-EST. PATIENT-LVL IV: ICD-10-PCS | Mod: PBBFAC,,, | Performed by: INTERNAL MEDICINE

## 2021-06-14 PROCEDURE — 90471 IMMUNIZATION ADMIN: CPT | Mod: PBBFAC

## 2021-06-14 RX ORDER — MOMETASONE FUROATE 50 UG/1
2 SPRAY, METERED NASAL DAILY
Qty: 17 G | Refills: 11 | Status: SHIPPED | OUTPATIENT
Start: 2021-06-14 | End: 2022-10-14

## 2021-06-14 RX ORDER — CALCIUM CARBONATE 200(500)MG
1 TABLET,CHEWABLE ORAL
COMMUNITY
End: 2021-10-27 | Stop reason: ALTCHOICE

## 2021-06-14 RX ORDER — PANTOPRAZOLE SODIUM 40 MG/1
40 TABLET, DELAYED RELEASE ORAL
COMMUNITY
Start: 2020-09-23 | End: 2022-02-24

## 2021-06-14 RX ORDER — ERGOCALCIFEROL 1.25 MG/1
50000 CAPSULE ORAL
COMMUNITY
End: 2022-02-24

## 2021-06-14 RX ORDER — TRIAMCINOLONE ACETONIDE 1 MG/G
CREAM TOPICAL
COMMUNITY
End: 2023-02-02 | Stop reason: SDUPTHER

## 2021-06-16 ENCOUNTER — SPECIALTY PHARMACY (OUTPATIENT)
Dept: PHARMACY | Facility: CLINIC | Age: 81
End: 2021-06-16

## 2021-07-12 ENCOUNTER — HOSPITAL ENCOUNTER (OUTPATIENT)
Dept: RADIOLOGY | Facility: CLINIC | Age: 81
Discharge: HOME | End: 2021-07-12
Attending: INTERNAL MEDICINE
Payer: MEDICARE

## 2021-07-12 DIAGNOSIS — M81.0 AGE-RELATED OSTEOPOROSIS WITHOUT CURRENT PATHOLOGICAL FRACTURE: ICD-10-CM

## 2021-07-12 PROCEDURE — 77080 DEXA BONE DENSITY SPINE HIP: ICD-10-PCS | Mod: 26,,, | Performed by: INTERNAL MEDICINE

## 2021-07-12 PROCEDURE — 77080 DXA BONE DENSITY AXIAL: CPT | Mod: 26,,, | Performed by: INTERNAL MEDICINE

## 2021-07-12 PROCEDURE — 77080 DXA BONE DENSITY AXIAL: CPT | Mod: TC

## 2021-07-14 ENCOUNTER — SPECIALTY PHARMACY (OUTPATIENT)
Dept: PHARMACY | Facility: CLINIC | Age: 81
End: 2021-07-14

## 2021-07-14 ENCOUNTER — PATIENT MESSAGE (OUTPATIENT)
Dept: PHARMACY | Facility: CLINIC | Age: 81
End: 2021-07-14

## 2021-07-20 ENCOUNTER — PATIENT MESSAGE (OUTPATIENT)
Dept: INTERNAL MEDICINE | Facility: CLINIC | Age: 81
End: 2021-07-20

## 2021-07-21 ENCOUNTER — PATIENT MESSAGE (OUTPATIENT)
Dept: ALLERGY | Facility: CLINIC | Age: 81
End: 2021-07-21

## 2021-08-11 ENCOUNTER — SPECIALTY PHARMACY (OUTPATIENT)
Dept: PHARMACY | Facility: CLINIC | Age: 81
End: 2021-08-11

## 2021-08-16 ENCOUNTER — TELEPHONE (OUTPATIENT)
Dept: OPHTHALMOLOGY | Facility: CLINIC | Age: 81
End: 2021-08-16

## 2021-08-16 RX ORDER — OLOPATADINE HYDROCHLORIDE 1 MG/ML
1 SOLUTION/ DROPS OPHTHALMIC 2 TIMES DAILY
Qty: 5 ML | Refills: 1 | Status: SHIPPED | OUTPATIENT
Start: 2021-08-16 | End: 2022-01-28

## 2021-08-17 ENCOUNTER — TELEPHONE (OUTPATIENT)
Dept: OPHTHALMOLOGY | Facility: CLINIC | Age: 81
End: 2021-08-17

## 2021-08-23 ENCOUNTER — OFFICE VISIT (OUTPATIENT)
Dept: OPTOMETRY | Facility: CLINIC | Age: 81
End: 2021-08-23
Payer: MEDICARE

## 2021-08-23 DIAGNOSIS — H10.13 ALLERGIC CONJUNCTIVITIS OF BOTH EYES: Primary | ICD-10-CM

## 2021-08-23 DIAGNOSIS — L23.9 ALLERGIC CONTACT DERMATITIS, UNSPECIFIED TRIGGER: ICD-10-CM

## 2021-08-23 PROCEDURE — 92012 PR EYE EXAM, EST PATIENT,INTERMED: ICD-10-PCS | Mod: S$PBB,,, | Performed by: OPTOMETRIST

## 2021-08-23 PROCEDURE — 92012 INTRM OPH EXAM EST PATIENT: CPT | Mod: S$PBB,,, | Performed by: OPTOMETRIST

## 2021-08-23 PROCEDURE — 99212 OFFICE O/P EST SF 10 MIN: CPT | Mod: PBBFAC | Performed by: OPTOMETRIST

## 2021-08-23 PROCEDURE — 99999 PR PBB SHADOW E&M-EST. PATIENT-LVL II: ICD-10-PCS | Mod: PBBFAC,,, | Performed by: OPTOMETRIST

## 2021-08-23 PROCEDURE — 99999 PR PBB SHADOW E&M-EST. PATIENT-LVL II: CPT | Mod: PBBFAC,,, | Performed by: OPTOMETRIST

## 2021-08-23 RX ORDER — TOBRAMYCIN AND DEXAMETHASONE 3; 1 MG/ML; MG/ML
1-2 SUSPENSION/ DROPS OPHTHALMIC 4 TIMES DAILY
Qty: 5 ML | Refills: 0 | Status: SHIPPED | OUTPATIENT
Start: 2021-08-23 | End: 2021-09-02

## 2021-08-23 RX ORDER — NEOMYCIN SULFATE, POLYMYXIN B SULFATE, AND DEXAMETHASONE 3.5; 10000; 1 MG/G; [USP'U]/G; MG/G
OINTMENT OPHTHALMIC
Qty: 3.5 G | Refills: 2 | Status: SHIPPED | OUTPATIENT
Start: 2021-08-23 | End: 2022-02-24

## 2021-09-11 DIAGNOSIS — L20.9 ATOPIC DERMATITIS, UNSPECIFIED TYPE: ICD-10-CM

## 2021-09-13 DIAGNOSIS — L20.9 ATOPIC DERMATITIS, UNSPECIFIED TYPE: ICD-10-CM

## 2021-09-13 RX ORDER — DUPILUMAB 300 MG/2ML
300 INJECTION, SOLUTION SUBCUTANEOUS
Qty: 4 ML | Refills: 11 | Status: SHIPPED | OUTPATIENT
Start: 2021-09-13 | End: 2022-08-10 | Stop reason: SDUPTHER

## 2021-09-13 RX ORDER — DUPILUMAB 300 MG/2ML
300 INJECTION, SOLUTION SUBCUTANEOUS
Qty: 4 ML | Refills: 11 | OUTPATIENT
Start: 2021-09-13 | End: 2021-09-13 | Stop reason: SDUPTHER

## 2021-09-14 ENCOUNTER — SPECIALTY PHARMACY (OUTPATIENT)
Dept: PHARMACY | Facility: CLINIC | Age: 81
End: 2021-09-14

## 2021-10-06 ENCOUNTER — SPECIALTY PHARMACY (OUTPATIENT)
Dept: PHARMACY | Facility: CLINIC | Age: 81
End: 2021-10-06

## 2021-10-27 ENCOUNTER — OFFICE VISIT (OUTPATIENT)
Dept: OTOLARYNGOLOGY | Facility: CLINIC | Age: 81
End: 2021-10-27
Payer: MEDICARE

## 2021-10-27 ENCOUNTER — CLINICAL SUPPORT (OUTPATIENT)
Dept: AUDIOLOGY | Facility: CLINIC | Age: 81
End: 2021-10-27
Payer: MEDICARE

## 2021-10-27 VITALS — HEART RATE: 59 BPM | DIASTOLIC BLOOD PRESSURE: 70 MMHG | SYSTOLIC BLOOD PRESSURE: 116 MMHG

## 2021-10-27 DIAGNOSIS — H90.3 SENSORINEURAL HEARING LOSS (SNHL) OF BOTH EARS: Primary | ICD-10-CM

## 2021-10-27 DIAGNOSIS — H93.12 TINNITUS OF LEFT EAR: ICD-10-CM

## 2021-10-27 DIAGNOSIS — H90.3 BILATERAL SENSORINEURAL HEARING LOSS: Primary | ICD-10-CM

## 2021-10-27 DIAGNOSIS — H90.3 ASYMMETRICAL SENSORINEURAL HEARING LOSS: ICD-10-CM

## 2021-10-27 DIAGNOSIS — H61.22 EXCESSIVE CERUMEN IN LEFT EAR CANAL: ICD-10-CM

## 2021-10-27 PROCEDURE — 99999 PR PBB SHADOW E&M-EST. PATIENT-LVL III: CPT | Mod: PBBFAC,,, | Performed by: NURSE PRACTITIONER

## 2021-10-27 PROCEDURE — 99203 PR OFFICE/OUTPT VISIT, NEW, LEVL III, 30-44 MIN: ICD-10-PCS | Mod: S$PBB,,, | Performed by: NURSE PRACTITIONER

## 2021-10-27 PROCEDURE — 99203 OFFICE O/P NEW LOW 30 MIN: CPT | Mod: S$PBB,,, | Performed by: NURSE PRACTITIONER

## 2021-10-27 PROCEDURE — 92557 COMPREHENSIVE HEARING TEST: CPT | Mod: PBBFAC | Performed by: AUDIOLOGIST

## 2021-10-27 PROCEDURE — 99999 PR PBB SHADOW E&M-EST. PATIENT-LVL III: ICD-10-PCS | Mod: PBBFAC,,, | Performed by: NURSE PRACTITIONER

## 2021-10-27 PROCEDURE — 92567 TYMPANOMETRY: CPT | Mod: PBBFAC | Performed by: AUDIOLOGIST

## 2021-10-27 PROCEDURE — 99213 OFFICE O/P EST LOW 20 MIN: CPT | Mod: PBBFAC | Performed by: NURSE PRACTITIONER

## 2021-10-28 ENCOUNTER — PATIENT MESSAGE (OUTPATIENT)
Dept: OTOLARYNGOLOGY | Facility: CLINIC | Age: 81
End: 2021-10-28
Payer: MEDICARE

## 2021-10-28 DIAGNOSIS — H90.A22 SENSORINEURAL HEARING LOSS (SNHL) OF LEFT EAR WITH RESTRICTED HEARING OF RIGHT EAR: ICD-10-CM

## 2021-10-29 ENCOUNTER — TELEPHONE (OUTPATIENT)
Dept: OTOLARYNGOLOGY | Facility: CLINIC | Age: 81
End: 2021-10-29
Payer: MEDICARE

## 2021-10-29 ENCOUNTER — SPECIALTY PHARMACY (OUTPATIENT)
Dept: PHARMACY | Facility: CLINIC | Age: 81
End: 2021-10-29
Payer: MEDICARE

## 2021-10-29 ENCOUNTER — TELEPHONE (OUTPATIENT)
Dept: OPHTHALMOLOGY | Facility: CLINIC | Age: 81
End: 2021-10-29
Payer: MEDICARE

## 2021-10-29 ENCOUNTER — PATIENT MESSAGE (OUTPATIENT)
Dept: OPHTHALMOLOGY | Facility: CLINIC | Age: 81
End: 2021-10-29

## 2021-10-29 ENCOUNTER — OFFICE VISIT (OUTPATIENT)
Dept: OPHTHALMOLOGY | Facility: CLINIC | Age: 81
End: 2021-10-29
Attending: OPHTHALMOLOGY
Payer: MEDICARE

## 2021-10-29 ENCOUNTER — PATIENT MESSAGE (OUTPATIENT)
Dept: OTOLARYNGOLOGY | Facility: CLINIC | Age: 81
End: 2021-10-29
Payer: MEDICARE

## 2021-10-29 DIAGNOSIS — H10.13 ALLERGIC CONJUNCTIVITIS OF BOTH EYES: Primary | ICD-10-CM

## 2021-10-29 PROCEDURE — 99213 OFFICE O/P EST LOW 20 MIN: CPT | Mod: PBBFAC | Performed by: OPHTHALMOLOGY

## 2021-10-29 PROCEDURE — 92012 INTRM OPH EXAM EST PATIENT: CPT | Mod: S$PBB,,, | Performed by: OPHTHALMOLOGY

## 2021-10-29 PROCEDURE — 99999 PR PBB SHADOW E&M-EST. PATIENT-LVL III: ICD-10-PCS | Mod: PBBFAC,,, | Performed by: OPHTHALMOLOGY

## 2021-10-29 PROCEDURE — 92012 PR EYE EXAM, EST PATIENT,INTERMED: ICD-10-PCS | Mod: S$PBB,,, | Performed by: OPHTHALMOLOGY

## 2021-10-29 PROCEDURE — 99999 PR PBB SHADOW E&M-EST. PATIENT-LVL III: CPT | Mod: PBBFAC,,, | Performed by: OPHTHALMOLOGY

## 2021-11-09 ENCOUNTER — TELEPHONE (OUTPATIENT)
Dept: OPHTHALMOLOGY | Facility: CLINIC | Age: 81
End: 2021-11-09
Payer: MEDICARE

## 2021-11-10 ENCOUNTER — HOSPITAL ENCOUNTER (OUTPATIENT)
Dept: RADIOLOGY | Facility: OTHER | Age: 81
Discharge: HOME | End: 2021-11-10
Attending: NURSE PRACTITIONER
Payer: MEDICARE

## 2021-11-10 DIAGNOSIS — H90.A22 SENSORINEURAL HEARING LOSS (SNHL) OF LEFT EAR WITH RESTRICTED HEARING OF RIGHT EAR: ICD-10-CM

## 2021-11-10 PROCEDURE — 70553 MRI IAC/TEMPORAL BONES W W/O CONTRAST: ICD-10-PCS | Mod: 26,,, | Performed by: RADIOLOGY

## 2021-11-10 PROCEDURE — 70553 MRI BRAIN STEM W/O & W/DYE: CPT | Mod: TC

## 2021-11-10 PROCEDURE — 70553 MRI BRAIN STEM W/O & W/DYE: CPT | Mod: 26,,, | Performed by: RADIOLOGY

## 2021-11-10 PROCEDURE — A9585 GADOBUTROL INJECTION: HCPCS | Performed by: NURSE PRACTITIONER

## 2021-11-10 PROCEDURE — 25500020 PHARM REV CODE 255: Performed by: NURSE PRACTITIONER

## 2021-11-10 RX ORDER — GADOBUTROL 604.72 MG/ML
5.5 INJECTION INTRAVENOUS
Status: COMPLETED | OUTPATIENT
Start: 2021-11-10 | End: 2021-11-10

## 2021-11-10 RX ADMIN — GADOBUTROL 5.5 ML: 604.72 INJECTION INTRAVENOUS at 04:11

## 2021-11-12 ENCOUNTER — TELEPHONE (OUTPATIENT)
Dept: OTOLARYNGOLOGY | Facility: CLINIC | Age: 81
End: 2021-11-12
Payer: MEDICARE

## 2021-11-30 ENCOUNTER — TELEPHONE (OUTPATIENT)
Dept: INTERNAL MEDICINE | Facility: CLINIC | Age: 81
End: 2021-11-30
Payer: MEDICARE

## 2021-12-02 ENCOUNTER — SPECIALTY PHARMACY (OUTPATIENT)
Dept: PHARMACY | Facility: CLINIC | Age: 81
End: 2021-12-02
Payer: MEDICARE

## 2021-12-03 ENCOUNTER — OFFICE VISIT (OUTPATIENT)
Dept: INTERNAL MEDICINE | Facility: CLINIC | Age: 81
End: 2021-12-03
Payer: MEDICARE

## 2021-12-03 DIAGNOSIS — J06.9 ACUTE URI: Primary | ICD-10-CM

## 2021-12-03 PROCEDURE — 99442 PR PHYSICIAN TELEPHONE EVALUATION 11-20 MIN: ICD-10-PCS | Mod: 95,,, | Performed by: INTERNAL MEDICINE

## 2021-12-03 PROCEDURE — 99442 PR PHYSICIAN TELEPHONE EVALUATION 11-20 MIN: CPT | Mod: 95,,, | Performed by: INTERNAL MEDICINE

## 2021-12-11 ENCOUNTER — PATIENT MESSAGE (OUTPATIENT)
Dept: INTERNAL MEDICINE | Facility: CLINIC | Age: 81
End: 2021-12-11
Payer: MEDICARE

## 2021-12-14 ENCOUNTER — OFFICE VISIT (OUTPATIENT)
Dept: URGENT CARE | Facility: CLINIC | Age: 81
End: 2021-12-14
Payer: MEDICARE

## 2021-12-14 VITALS
BODY MASS INDEX: 20.83 KG/M2 | SYSTOLIC BLOOD PRESSURE: 123 MMHG | DIASTOLIC BLOOD PRESSURE: 66 MMHG | TEMPERATURE: 98 F | RESPIRATION RATE: 15 BRPM | WEIGHT: 122 LBS | HEART RATE: 74 BPM | HEIGHT: 64 IN | OXYGEN SATURATION: 96 %

## 2021-12-14 DIAGNOSIS — B96.89 ACUTE BACTERIAL BRONCHITIS: Primary | ICD-10-CM

## 2021-12-14 DIAGNOSIS — J20.8 ACUTE BACTERIAL BRONCHITIS: Primary | ICD-10-CM

## 2021-12-14 DIAGNOSIS — R05.9 COUGH: ICD-10-CM

## 2021-12-14 LAB
CTP QC/QA: YES
SARS-COV-2 RDRP RESP QL NAA+PROBE: NEGATIVE

## 2021-12-14 PROCEDURE — 99214 PR OFFICE/OUTPT VISIT, EST, LEVL IV, 30-39 MIN: ICD-10-PCS | Mod: S$GLB,,, | Performed by: NURSE PRACTITIONER

## 2021-12-14 PROCEDURE — U0002: ICD-10-PCS | Mod: QW,CR,S$GLB, | Performed by: NURSE PRACTITIONER

## 2021-12-14 PROCEDURE — 99214 OFFICE O/P EST MOD 30 MIN: CPT | Mod: S$GLB,,, | Performed by: NURSE PRACTITIONER

## 2021-12-14 PROCEDURE — U0002 COVID-19 LAB TEST NON-CDC: HCPCS | Mod: QW,CR,S$GLB, | Performed by: NURSE PRACTITIONER

## 2021-12-14 PROCEDURE — 71046 XR CHEST PA AND LATERAL: ICD-10-PCS | Mod: S$GLB,,, | Performed by: RADIOLOGY

## 2021-12-14 PROCEDURE — 71046 X-RAY EXAM CHEST 2 VIEWS: CPT | Mod: S$GLB,,, | Performed by: RADIOLOGY

## 2021-12-14 RX ORDER — ALBUTEROL SULFATE 90 UG/1
2 AEROSOL, METERED RESPIRATORY (INHALATION) EVERY 6 HOURS PRN
Qty: 18 G | Refills: 0 | Status: SHIPPED | OUTPATIENT
Start: 2021-12-14 | End: 2022-02-24

## 2021-12-14 RX ORDER — AZITHROMYCIN 250 MG/1
TABLET, FILM COATED ORAL
Qty: 6 TABLET | Refills: 0 | Status: SHIPPED | OUTPATIENT
Start: 2021-12-14 | End: 2021-12-19

## 2022-01-26 ENCOUNTER — SPECIALTY PHARMACY (OUTPATIENT)
Dept: PHARMACY | Facility: CLINIC | Age: 82
End: 2022-01-26
Payer: MEDICARE

## 2022-01-26 ENCOUNTER — PATIENT MESSAGE (OUTPATIENT)
Dept: ALLERGY | Facility: CLINIC | Age: 82
End: 2022-01-26
Payer: MEDICARE

## 2022-01-26 RX ORDER — OLOPATADINE HYDROCHLORIDE 1 MG/ML
1 SOLUTION/ DROPS OPHTHALMIC 2 TIMES DAILY
Qty: 5 ML | Refills: 6 | Status: SHIPPED | OUTPATIENT
Start: 2022-01-26 | End: 2022-08-30 | Stop reason: SDUPTHER

## 2022-01-26 NOTE — TELEPHONE ENCOUNTER
Specialty Pharmacy - Refill Coordination    Specialty Medication Orders Linked to Encounter    Flowsheet Row Most Recent Value   Medication #1 dupilumab (DUPIXENT SYRINGE) 300 mg/2 mL Syrg (Order#[id number], Rx#[id number])          Refill Questions - Documented Responses    Flowsheet Row Most Recent Value   Patient Availability and HIPAA Verification    Does patient want to proceed with activity? Yes   HIPAA/medical authority confirmed? Yes   Relationship to patient of person spoken to? Self   Refill Screening Questions    Changes to allergies? No   Changes to medications? No   New conditions since last clinic visit? No   Unplanned office visit, urgent care, ED, or hospital admission in the last 4 weeks? No   How does patient/caregiver feel medication is working? Good   Financial problems or insurance changes? No   How many doses of your specialty medications were missed in the last 4 weeks? 0   Would patient like to speak to a pharmacist? No   When does the patient need to receive the medication? 02/02/22   Refill Delivery Questions    How will the patient receive the medication? Delivery Anne-Marie   When does the patient need to receive the medication? 02/02/22   Shipping Address Home   Address in [facility] confirmed and updated if neccessary? Yes   Expected Copay ($) 80   Is the patient able to afford the medication copay? Yes   Payment Method CC on file   Days supply of Refill 28   Supplies needed? No supplies needed   Refill activity completed? Yes   Refill activity plan Refill scheduled   Shipment/Pickup Date: 02/01/22          Current Outpatient Medications   Medication Sig    albuterol (PROVENTIL HFA) 90 mcg/actuation inhaler Inhale 2 puffs into the lungs every 6 (six) hours as needed for Shortness of Breath (cough). Rescue    dupilumab (DUPIXENT SYRINGE) 300 mg/2 mL Syrg Inject 2 mLs (300 mg total) into the skin every 14 (fourteen) days. Start 14 days after initial 600mg dose    ergocalciferol  (ERGOCALCIFEROL) 50,000 unit Cap Take 50,000 Units by mouth.    levetiracetam (KEPPRA XR) 500 mg Tb24 Take 500 mg by mouth every evening. Pt takes 5 tablets nightly for a total of 2500mg    levocetirizine (XYZAL) 5 MG tablet Take 2 tablets (10 mg total) by mouth 2 (two) times daily. For chronic urticaria    mometasone (NASONEX) 50 mcg/actuation nasal spray 2 sprays by Nasal route once daily.    neomycin-polymyxin-dexamethasone (DEXACINE) 3.5 mg/g-10,000 unit/g-0.1 % Oint Apply 0.5 inch strip of ointment to affected areas around the eyes 2x/day for 10 days.    olopatadine (PATANOL) 0.1 % ophthalmic solution Place 1 drop into both eyes 2 (two) times daily.    pantoprazole (PROTONIX) 40 MG tablet Take 40 mg by mouth.    triamcinolone acetonide 0.1% (KENALOG) 0.1 % cream Apply topically.   Last reviewed on 12/14/2021  1:06 PM by Jenni Blackmon, NP-C    Review of patient's allergies indicates:   Allergen Reactions    House dust mite Hives, Itching, Rash and Swelling    Last reviewed on  12/14/2021 1:06 PM by Jenni Blackmon      Tasks added this encounter   2/23/2022 - Refill Call (Auto Added)   Tasks due within next 3 months   No tasks due.     [facility] - [facility] Pharm[facility]  [address] [place] [id number]  Phone: [phone]  Fax: [phone]

## 2022-01-27 RX ORDER — TACROLIMUS 1 MG/G
OINTMENT TOPICAL 2 TIMES DAILY
Qty: 60 G | Refills: 3 | Status: SHIPPED | OUTPATIENT
Start: 2022-01-27 | End: 2022-10-14

## 2022-02-23 ENCOUNTER — SPECIALTY PHARMACY (OUTPATIENT)
Dept: PHARMACY | Facility: CLINIC | Age: 82
End: 2022-02-23
Payer: MEDICARE

## 2022-02-23 ENCOUNTER — PATIENT MESSAGE (OUTPATIENT)
Dept: PHARMACY | Facility: CLINIC | Age: 82
End: 2022-02-23
Payer: MEDICARE

## 2022-02-23 NOTE — TELEPHONE ENCOUNTER
Specialty Pharmacy - Refill Coordination    Specialty Medication Orders Linked to Encounter    Flowsheet Row Most Recent Value   Medication #1 dupilumab (DUPIXENT SYRINGE) 300 mg/2 mL Syrg (Order#[id number], Rx#[id number])          Refill Questions - Documented Responses    Flowsheet Row Most Recent Value   Patient Availability and HIPAA Verification    Does patient want to proceed with activity? Yes   HIPAA/medical authority confirmed? Yes   Relationship to patient of person spoken to? Self   Refill Screening Questions    Changes to allergies? No   Changes to medications? No   New conditions since last clinic visit? No   Unplanned office visit, urgent care, ED, or hospital admission in the last 4 weeks? No   How does patient/caregiver feel medication is working? Excellent   Financial problems or insurance changes? No   How many doses of your specialty medications were missed in the last 4 weeks? 0   Would patient like to speak to a pharmacist? No   When does the patient need to receive the medication? 03/02/22   Refill Delivery Questions    How will the patient receive the medication? Delivery Anne-Marie   When does the patient need to receive the medication? 03/02/22   Shipping Address Home   Address in [facility] confirmed and updated if neccessary? Yes   Expected Copay ($) 0   Is the patient able to afford the medication copay? Yes   Payment Method zero copay   Days supply of Refill 28   Supplies needed? No supplies needed   Refill activity completed? Yes   Refill activity plan Refill scheduled   Shipment/Pickup Date: 02/25/22          Current Outpatient Medications   Medication Sig    albuterol (PROVENTIL HFA) 90 mcg/actuation inhaler Inhale 2 puffs into the lungs every 6 (six) hours as needed for Shortness of Breath (cough). Rescue    dupilumab (DUPIXENT SYRINGE) 300 mg/2 mL Syrg Inject 2 mLs (300 mg total) into the skin every 14 (fourteen) days. Start 14 days after initial 600mg dose    ergocalciferol  (ERGOCALCIFEROL) 50,000 unit Cap Take 50,000 Units by mouth.    levetiracetam (KEPPRA XR) 500 mg Tb24 Take 500 mg by mouth every evening. Pt takes 5 tablets nightly for a total of 2500mg    levocetirizine (XYZAL) 5 MG tablet Take 2 tablets (10 mg total) by mouth 2 (two) times daily. For chronic urticaria    mometasone (NASONEX) 50 mcg/actuation nasal spray 2 sprays by Nasal route once daily.    neomycin-polymyxin-dexamethasone (DEXACINE) 3.5 mg/g-10,000 unit/g-0.1 % Oint Apply 0.5 inch strip of ointment to affected areas around the eyes 2x/day for 10 days.    olopatadine (PATANOL) 0.1 % ophthalmic solution INSTILL 1 DROP IN BOTH EYES TWICE DAILY    olopatadine (PATANOL) 0.1 % ophthalmic solution Place 1 drop into both eyes 2 (two) times daily. As needed    pantoprazole (PROTONIX) 40 MG tablet Take 40 mg by mouth.    tacrolimus (PROTOPIC) 0.1 % ointment Apply topically 2 (two) times daily. To affected areas as needed    triamcinolone acetonide 0.1% (KENALOG) 0.1 % cream Apply topically.   Last reviewed on 12/14/2021  1:06 PM by Jenni Blackmon NP-C    Review of patient's allergies indicates:   Allergen Reactions    House dust mite Hives, Itching, Rash and Swelling    Last reviewed on  1/27/2022 5:17 PM by Claude Kahn      Tasks added this encounter   3/23/2022 - Refill Call (Auto Added)   Tasks due within next 3 months   No tasks due.     Serina Ellis, PharmD  [facility]  [address] [place] [id number]  Phone: [phone]  Fax: [phone]

## 2022-02-24 ENCOUNTER — OFFICE VISIT (OUTPATIENT)
Dept: OPTOMETRY | Facility: CLINIC | Age: 82
End: 2022-02-24
Payer: MEDICARE

## 2022-02-24 DIAGNOSIS — Z96.1 PSEUDOPHAKIA OF BOTH EYES: ICD-10-CM

## 2022-02-24 DIAGNOSIS — L23.9 ALLERGIC CONTACT DERMATITIS, UNSPECIFIED TRIGGER: ICD-10-CM

## 2022-02-24 DIAGNOSIS — H10.13 ALLERGIC CONJUNCTIVITIS OF BOTH EYES: Primary | ICD-10-CM

## 2022-02-24 PROCEDURE — 99999 PR PBB SHADOW E&M-EST. PATIENT-LVL III: ICD-10-PCS | Mod: PBBFAC,,, | Performed by: OPTOMETRIST

## 2022-02-24 PROCEDURE — 99999 PR PBB SHADOW E&M-EST. PATIENT-LVL III: CPT | Mod: PBBFAC,,, | Performed by: OPTOMETRIST

## 2022-02-24 PROCEDURE — 92014 COMPRE OPH EXAM EST PT 1/>: CPT | Mod: S$PBB,,, | Performed by: OPTOMETRIST

## 2022-02-24 PROCEDURE — 99213 OFFICE O/P EST LOW 20 MIN: CPT | Mod: PBBFAC | Performed by: OPTOMETRIST

## 2022-02-24 PROCEDURE — 92014 PR EYE EXAM, EST PATIENT,COMPREHESV: ICD-10-PCS | Mod: S$PBB,,, | Performed by: OPTOMETRIST

## 2022-02-24 RX ORDER — CEFDINIR 300 MG/1
300 CAPSULE ORAL 2 TIMES DAILY
COMMUNITY
Start: 2022-01-26 | End: 2022-02-24

## 2022-02-24 RX ORDER — NEOMYCIN SULFATE, POLYMYXIN B SULFATE, AND DEXAMETHASONE 3.5; 10000; 1 MG/G; [USP'U]/G; MG/G
OINTMENT OPHTHALMIC 2 TIMES DAILY
Qty: 3.5 G | Refills: 6 | Status: SHIPPED | OUTPATIENT
Start: 2022-02-24 | End: 2022-10-14

## 2022-02-24 NOTE — PROGRESS NOTES
HPI     Last eye exam was 8/23/21 with Dr. Julian.  Patient states still having issues with ocular allergies. Immunologist   prescribed drops to use until she came to her appointment. Has eczema and   Immunologist concluded she gets a breakout on eyelid margins. Wanted to   know if she should be using another ointment.     Patanol prn OU (during eczema flare up)  Tacrolimus ginger prn OU  Erythromycin ginger prn OU    Last edited by Andreea Lemus MA on 2/24/2022 12:55 PM. (History)            Assessment /Plan     For exam results, see Encounter Report.    Allergic conjunctivitis of both eyes  -     neomycin-polymyxin-dexamethasone (DEXACINE) 3.5 mg/g-10,000 unit/g-0.1 % Oint; Place into both eyes 2 (two) times a day.  Dispense: 3.5 g; Refill: 6    Allergic contact dermatitis, unspecified trigger    Pseudophakia of both eyes            1-2.  Allergies followed by Dr. Kahn--treated with Dupilumab.  Uses Patanol only during flare-ups.  Rx given for Maxitrol ointment to apply to lids during flare-ups.  3.  Pt doing well since cataracts surgery.   Continue w/ current rx.   Retina flat and intact OU--no holes, tears, breaks, or RDs.  Eye health normal OU.      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  No signs of recurrence.

## 2022-03-12 ENCOUNTER — PATIENT MESSAGE (OUTPATIENT)
Dept: OPTOMETRY | Facility: CLINIC | Age: 82
End: 2022-03-12
Payer: MEDICARE

## 2022-03-18 ENCOUNTER — LAB VISIT (OUTPATIENT)
Dept: LAB | Facility: HOSPITAL | Age: 82
End: 2022-03-18
Attending: INTERNAL MEDICINE
Payer: MEDICARE

## 2022-03-18 ENCOUNTER — OFFICE VISIT (OUTPATIENT)
Dept: INTERNAL MEDICINE | Facility: CLINIC | Age: 82
End: 2022-03-18
Payer: MEDICARE

## 2022-03-18 VITALS
SYSTOLIC BLOOD PRESSURE: 110 MMHG | BODY MASS INDEX: 21.68 KG/M2 | DIASTOLIC BLOOD PRESSURE: 76 MMHG | HEART RATE: 62 BPM | HEIGHT: 64 IN | OXYGEN SATURATION: 99 % | WEIGHT: 127 LBS

## 2022-03-18 DIAGNOSIS — R00.2 HEART PALPITATIONS: ICD-10-CM

## 2022-03-18 DIAGNOSIS — R00.2 HEART PALPITATIONS: Primary | ICD-10-CM

## 2022-03-18 PROBLEM — T78.40XA ALLERGIC REACTION: Status: RESOLVED | Noted: 2017-04-17 | Resolved: 2022-03-18

## 2022-03-18 PROBLEM — H25.12 NUCLEAR SCLEROTIC CATARACT OF LEFT EYE: Status: RESOLVED | Noted: 2019-09-30 | Resolved: 2022-03-18

## 2022-03-18 PROBLEM — Z98.890 POST-OPERATIVE STATE: Status: RESOLVED | Noted: 2019-09-30 | Resolved: 2022-03-18

## 2022-03-18 LAB
ALBUMIN SERPL BCP-MCNC: 4.2 G/DL (ref 3.5–5.2)
ALP SERPL-CCNC: 45 U/L (ref 55–135)
ALT SERPL W/O P-5'-P-CCNC: 12 U/L (ref 10–44)
ANION GAP SERPL CALC-SCNC: 10 MMOL/L (ref 8–16)
AST SERPL-CCNC: 21 U/L (ref 10–40)
BASOPHILS # BLD AUTO: 0.05 K/UL (ref 0–0.2)
BASOPHILS NFR BLD: 1.1 % (ref 0–1.9)
BILIRUB SERPL-MCNC: 0.5 MG/DL (ref 0.1–1)
BUN SERPL-MCNC: 12 MG/DL (ref 8–23)
CALCIUM SERPL-MCNC: 10.2 MG/DL (ref 8.7–10.5)
CHLORIDE SERPL-SCNC: 104 MMOL/L (ref 95–110)
CO2 SERPL-SCNC: 27 MMOL/L (ref 23–29)
CREAT SERPL-MCNC: 0.7 MG/DL (ref 0.5–1.4)
DIFFERENTIAL METHOD: ABNORMAL
EOSINOPHIL # BLD AUTO: 0.5 K/UL (ref 0–0.5)
EOSINOPHIL NFR BLD: 10.5 % (ref 0–8)
ERYTHROCYTE [DISTWIDTH] IN BLOOD BY AUTOMATED COUNT: 13.2 % (ref 11.5–14.5)
EST. GFR  (AFRICAN AMERICAN): >60 ML/MIN/1.73 M^2
EST. GFR  (NON AFRICAN AMERICAN): >60 ML/MIN/1.73 M^2
GLUCOSE SERPL-MCNC: 92 MG/DL (ref 70–110)
HCT VFR BLD AUTO: 40.1 % (ref 37–48.5)
HGB BLD-MCNC: 12.7 G/DL (ref 12–16)
IMM GRANULOCYTES # BLD AUTO: 0 K/UL (ref 0–0.04)
IMM GRANULOCYTES NFR BLD AUTO: 0 % (ref 0–0.5)
LYMPHOCYTES # BLD AUTO: 1.7 K/UL (ref 1–4.8)
LYMPHOCYTES NFR BLD: 36 % (ref 18–48)
MCH RBC QN AUTO: 29.4 PG (ref 27–31)
MCHC RBC AUTO-ENTMCNC: 31.7 G/DL (ref 32–36)
MCV RBC AUTO: 93 FL (ref 82–98)
MONOCYTES # BLD AUTO: 0.5 K/UL (ref 0.3–1)
MONOCYTES NFR BLD: 11.1 % (ref 4–15)
NEUTROPHILS # BLD AUTO: 1.9 K/UL (ref 1.8–7.7)
NEUTROPHILS NFR BLD: 41.3 % (ref 38–73)
NRBC BLD-RTO: 0 /100 WBC
PLATELET # BLD AUTO: 216 K/UL (ref 150–450)
PMV BLD AUTO: 11.1 FL (ref 9.2–12.9)
POTASSIUM SERPL-SCNC: 4.8 MMOL/L (ref 3.5–5.1)
PROT SERPL-MCNC: 7.5 G/DL (ref 6–8.4)
RBC # BLD AUTO: 4.32 M/UL (ref 4–5.4)
SODIUM SERPL-SCNC: 141 MMOL/L (ref 136–145)
TSH SERPL DL<=0.005 MIU/L-ACNC: 2.79 UIU/ML (ref 0.4–4)
WBC # BLD AUTO: 4.67 K/UL (ref 3.9–12.7)

## 2022-03-18 PROCEDURE — 99214 OFFICE O/P EST MOD 30 MIN: CPT | Mod: S$PBB,,, | Performed by: INTERNAL MEDICINE

## 2022-03-18 PROCEDURE — 99999 PR PBB SHADOW E&M-EST. PATIENT-LVL IV: ICD-10-PCS | Mod: PBBFAC,,, | Performed by: INTERNAL MEDICINE

## 2022-03-18 PROCEDURE — 36415 COLL VENOUS BLD VENIPUNCTURE: CPT | Performed by: INTERNAL MEDICINE

## 2022-03-18 PROCEDURE — 85025 COMPLETE CBC W/AUTO DIFF WBC: CPT | Performed by: INTERNAL MEDICINE

## 2022-03-18 PROCEDURE — 84443 ASSAY THYROID STIM HORMONE: CPT | Performed by: INTERNAL MEDICINE

## 2022-03-18 PROCEDURE — 99214 OFFICE O/P EST MOD 30 MIN: CPT | Mod: PBBFAC | Performed by: INTERNAL MEDICINE

## 2022-03-18 PROCEDURE — 99214 PR OFFICE/OUTPT VISIT, EST, LEVL IV, 30-39 MIN: ICD-10-PCS | Mod: S$PBB,,, | Performed by: INTERNAL MEDICINE

## 2022-03-18 PROCEDURE — 99999 PR PBB SHADOW E&M-EST. PATIENT-LVL IV: CPT | Mod: PBBFAC,,, | Performed by: INTERNAL MEDICINE

## 2022-03-18 PROCEDURE — 80053 COMPREHEN METABOLIC PANEL: CPT | Performed by: INTERNAL MEDICINE

## 2022-03-18 NOTE — PROGRESS NOTES
Gabriela Edwards presents today to urgent care for:  Ankle Pain and heart fluttler      Palpitations   This is a new problem. The current episode started more than 1 month ago. The problem occurs intermittently. The problem has been unchanged. On average, each episode lasts 5 minutes. Nothing aggravates the symptoms. Pertinent negatives include no anxiety, chest fullness, coughing, diaphoresis, dizziness, fever, irregular heartbeat, malaise/fatigue, near-syncope, shortness of breath or vomiting. She has tried nothing for the symptoms. There are no known risk factors. There is no history of anemia, anxiety, heart disease, hyperthyroidism or a valve disorder.       Past medical, social, family and surgical history was reviewed and updated today as needed. See encounter for details.     Review of Systems   Constitutional: Negative for diaphoresis, fever and malaise/fatigue.   Respiratory: Negative for cough and shortness of breath.    Cardiovascular: Positive for palpitations. Negative for near-syncope.   Gastrointestinal: Negative for vomiting.   Musculoskeletal:        She occasionally feels her pulse in her left lateral ankle but it resolves.  It's not related to her palpitations. She also states it twitches which makes me think it's not her pulse and it's just some localized fasciculations    Neurological: Negative for dizziness.   Psychiatric/Behavioral: The patient is not nervous/anxious.        Vitals:    03/18/22 0927   BP: 110/76   Pulse: 62   Body mass index is 21.8 kg/m².   Physical Exam  Vitals reviewed.   Constitutional:       Appearance: She is well-developed.   HENT:      Nose: Nose normal.   Eyes:      Pupils: Pupils are equal, round, and reactive to light.   Neck:      Thyroid: No thyromegaly.      Vascular: No JVD.   Cardiovascular:      Rate and Rhythm: Normal rate and regular rhythm.      Heart sounds: Normal heart sounds.   Pulmonary:      Effort: Pulmonary effort is normal.      Breath sounds:  Normal breath sounds. No wheezing or rales.   Abdominal:      General: Bowel sounds are normal.      Palpations: Abdomen is soft. There is no mass.      Tenderness: There is no abdominal tenderness.   Musculoskeletal:         General: Normal range of motion.      Cervical back: Neck supple.   Lymphadenopathy:      Cervical: No cervical adenopathy.   Neurological:      Mental Status: She is alert and oriented to person, place, and time.      Deep Tendon Reflexes: Reflexes are normal and symmetric.   Psychiatric:         Behavior: Behavior normal.          Assessment/plan:   1. Heart palpitations  Needs additional evaluation for this new complaint.  Will notify her of the results and any additional recommendations at that time.   - CBC Auto Differential; Future  - Comprehensive Metabolic Panel; Future  - TSH; Future  - Holter monitor - 48 hour; Future    I spent a total of 30 minutes on the day of the visit.This includes face to face time and non-face to face time preparing to see the patient (eg, review of tests), obtaining and/or reviewing separately obtained history, documenting clinical information in the electronic or other health record, independently interpreting results and communicating results to the patient/family/caregiver, and coordinating care.

## 2022-03-23 ENCOUNTER — PATIENT MESSAGE (OUTPATIENT)
Dept: PHARMACY | Facility: CLINIC | Age: 82
End: 2022-03-23
Payer: MEDICARE

## 2022-03-23 ENCOUNTER — SPECIALTY PHARMACY (OUTPATIENT)
Dept: PHARMACY | Facility: CLINIC | Age: 82
End: 2022-03-23
Payer: MEDICARE

## 2022-03-23 NOTE — TELEPHONE ENCOUNTER
Specialty Pharmacy - Refill Coordination    Specialty Medication Orders Linked to Encounter    Flowsheet Row Most Recent Value   Medication #1 dupilumab (DUPIXENT SYRINGE) 300 mg/2 mL Syrg (Order#[id number], Rx#[id number])          Refill Questions - Documented Responses    Flowsheet Row Most Recent Value   Patient Availability and HIPAA Verification    Does patient want to proceed with activity? Yes   HIPAA/medical authority confirmed? Yes   Relationship to patient of person spoken to? Self   Refill Screening Questions    Changes to allergies? No   Changes to medications? No   New conditions since last clinic visit? No   Unplanned office visit, urgent care, ED, or hospital admission in the last 4 weeks? No   How does patient/caregiver feel medication is working? Excellent   Financial problems or insurance changes? No   How many doses of your specialty medications were missed in the last 4 weeks? 0   Would patient like to speak to a pharmacist? No   When does the patient need to receive the medication? 03/30/22   Refill Delivery Questions    How will the patient receive the medication? Delivery Anne-Marie   When does the patient need to receive the medication? 03/30/22   Shipping Address Home   Address in [facility] confirmed and updated if neccessary? Yes   Expected Copay ($) 0   Is the patient able to afford the medication copay? Yes   Payment Method zero copay   Days supply of Refill 28   Supplies needed? No supplies needed   Refill activity completed? Yes   Refill activity plan Refill scheduled   Shipment/Pickup Date: 03/24/22          Current Outpatient Medications   Medication Sig    dupilumab (DUPIXENT SYRINGE) 300 mg/2 mL Syrg Inject 2 mLs (300 mg total) into the skin every 14 (fourteen) days. Start 14 days after initial 600mg dose    levetiracetam (KEPPRA XR) 500 mg Tb24 Take 500 mg by mouth every evening. Pt takes 5 tablets nightly for a total of 2500mg    levocetirizine (XYZAL) 5 MG tablet Take 2  tablets (10 mg total) by mouth 2 (two) times daily. For chronic urticaria    mometasone (NASONEX) 50 mcg/actuation nasal spray 2 sprays by Nasal route once daily.    neomycin-polymyxin-dexamethasone (DEXACINE) 3.5 mg/g-10,000 unit/g-0.1 % Oint Place into both eyes 2 (two) times a day.    olopatadine (PATANOL) 0.1 % ophthalmic solution INSTILL 1 DROP IN BOTH EYES TWICE DAILY    olopatadine (PATANOL) 0.1 % ophthalmic solution Place 1 drop into both eyes 2 (two) times daily. As needed    tacrolimus (PROTOPIC) 0.1 % ointment Apply topically 2 (two) times daily. To affected areas as needed    triamcinolone acetonide 0.1% (KENALOG) 0.1 % cream Apply topically.   Last reviewed on 3/18/2022  9:53 AM by KEVIN Talavera II, MD    Review of patient's allergies indicates:   Allergen Reactions    House dust mite Hives, Itching, Rash and Swelling    Last reviewed on  3/18/2022 9:53 AM by EDDY Talavera      Tasks added this encounter   4/20/2022 - Refill Call (Auto Added)   Tasks due within next 3 months   No tasks due.     Katie Mars, PharmD  [facility]  [address] [place] [id number]  Phone: [phone]  Fax: [phone]

## 2022-03-25 ENCOUNTER — PES CALL (OUTPATIENT)
Dept: ADMINISTRATIVE | Facility: CLINIC | Age: 82
End: 2022-03-25
Payer: MEDICARE

## 2022-03-28 ENCOUNTER — CLINICAL SUPPORT (OUTPATIENT)
Dept: CARDIOLOGY | Facility: HOSPITAL | Age: 82
End: 2022-03-28
Attending: INTERNAL MEDICINE
Payer: MEDICARE

## 2022-03-28 DIAGNOSIS — R00.2 HEART PALPITATIONS: ICD-10-CM

## 2022-03-28 PROCEDURE — 93227 XTRNL ECG REC<48 HR R&I: CPT | Mod: ,,, | Performed by: INTERNAL MEDICINE

## 2022-03-28 PROCEDURE — 93225 XTRNL ECG REC<48 HRS REC: CPT

## 2022-03-28 PROCEDURE — 93227 HOLTER MONITOR - 48 HOUR (CUPID ONLY): ICD-10-PCS | Mod: ,,, | Performed by: INTERNAL MEDICINE

## 2022-04-05 LAB
OHS CV EVENT MONITOR DAY: 0
OHS CV HOLTER LENGTH DECIMAL HOURS: 48
OHS CV HOLTER LENGTH HOURS: 48
OHS CV HOLTER LENGTH MINUTES: 0
OHS CV HOLTER SINUS AVERAGE HR: 68
OHS CV HOLTER SINUS MAX HR: 146
OHS CV HOLTER SINUS MIN HR: 39

## 2022-04-06 DIAGNOSIS — I49.3 FREQUENT PVCS: Primary | ICD-10-CM

## 2022-04-06 DIAGNOSIS — R00.2 PALPITATIONS: ICD-10-CM

## 2022-04-12 DIAGNOSIS — I49.3 PVC'S (PREMATURE VENTRICULAR CONTRACTIONS): Primary | ICD-10-CM

## 2022-04-18 ENCOUNTER — PATIENT MESSAGE (OUTPATIENT)
Dept: ADMINISTRATIVE | Facility: OTHER | Age: 82
End: 2022-04-18
Payer: MEDICARE

## 2022-04-18 ENCOUNTER — TELEPHONE (OUTPATIENT)
Dept: OPHTHALMOLOGY | Facility: CLINIC | Age: 82
End: 2022-04-18
Payer: MEDICARE

## 2022-04-18 ENCOUNTER — PATIENT MESSAGE (OUTPATIENT)
Dept: OPTOMETRY | Facility: CLINIC | Age: 82
End: 2022-04-18
Payer: MEDICARE

## 2022-04-19 ENCOUNTER — HOSPITAL ENCOUNTER (OUTPATIENT)
Dept: CARDIOLOGY | Facility: OTHER | Age: 82
Discharge: HOME | End: 2022-04-19
Attending: INTERNAL MEDICINE
Payer: MEDICARE

## 2022-04-19 ENCOUNTER — HOSPITAL ENCOUNTER (OUTPATIENT)
Dept: CARDIOLOGY | Facility: CLINIC | Age: 82
Discharge: HOME | End: 2022-04-19
Payer: MEDICARE

## 2022-04-19 ENCOUNTER — OFFICE VISIT (OUTPATIENT)
Dept: ELECTROPHYSIOLOGY | Facility: CLINIC | Age: 82
End: 2022-04-19
Payer: MEDICARE

## 2022-04-19 VITALS
HEART RATE: 50 BPM | DIASTOLIC BLOOD PRESSURE: 62 MMHG | SYSTOLIC BLOOD PRESSURE: 110 MMHG | BODY MASS INDEX: 22.06 KG/M2 | WEIGHT: 129.19 LBS | HEIGHT: 64 IN

## 2022-04-19 VITALS
DIASTOLIC BLOOD PRESSURE: 62 MMHG | HEIGHT: 64 IN | HEART RATE: 50 BPM | BODY MASS INDEX: 22.02 KG/M2 | SYSTOLIC BLOOD PRESSURE: 110 MMHG | WEIGHT: 129 LBS

## 2022-04-19 DIAGNOSIS — R00.2 PALPITATIONS: ICD-10-CM

## 2022-04-19 DIAGNOSIS — I49.3 FREQUENT PVCS: ICD-10-CM

## 2022-04-19 DIAGNOSIS — I49.3 PVC'S (PREMATURE VENTRICULAR CONTRACTIONS): ICD-10-CM

## 2022-04-19 LAB
ASCENDING AORTA: 2.37 CM
AV INDEX (PROSTH): 0.57
AV MEAN GRADIENT: 9 MMHG
AV PEAK GRADIENT: 18 MMHG
AV REGURGITATION PRESSURE HALF TIME: 674 MS
AV VALVE AREA: 1.75 CM2
AV VELOCITY RATIO: 0.47
BSA FOR ECHO PROCEDURE: 1.63 M2
CV ECHO LV RWT: 0.32 CM
DOP CALC AO PEAK VEL: 2.12 M/S
DOP CALC AO VTI: 38.88 CM
DOP CALC LVOT AREA: 3 CM2
DOP CALC LVOT DIAMETER: 1.97 CM
DOP CALC LVOT PEAK VEL: 1 M/S
DOP CALC LVOT STROKE VOLUME: 68.09 CM3
DOP CALC MV VTI: 207.17 CM
DOP CALCLVOT PEAK VEL VTI: 22.35 CM
E WAVE DECELERATION TIME: 302.05 MSEC
E/A RATIO: 0.61
E/E' RATIO: 5.86 M/S
ECHO LV POSTERIOR WALL: 0.73 CM (ref 0.6–1.1)
EJECTION FRACTION: 65 %
FRACTIONAL SHORTENING: 44 % (ref 28–44)
INTERVENTRICULAR SEPTUM: 0.67 CM (ref 0.6–1.1)
IVRT: 98.95 MSEC
LA MAJOR: 4.53 CM
LA MINOR: 4.34 CM
LA WIDTH: 4.38 CM
LEFT ATRIUM SIZE: 3.02 CM
LEFT ATRIUM VOLUME INDEX MOD: 32.7 ML/M2
LEFT ATRIUM VOLUME INDEX: 30.8 ML/M2
LEFT ATRIUM VOLUME MOD: 53 CM3
LEFT ATRIUM VOLUME: 49.84 CM3
LEFT INTERNAL DIMENSION IN SYSTOLE: 2.53 CM (ref 2.1–4)
LEFT VENTRICLE DIASTOLIC VOLUME INDEX: 57.23 ML/M2
LEFT VENTRICLE DIASTOLIC VOLUME: 92.72 ML
LEFT VENTRICLE MASS INDEX: 59 G/M2
LEFT VENTRICLE SYSTOLIC VOLUME INDEX: 14.3 ML/M2
LEFT VENTRICLE SYSTOLIC VOLUME: 23.09 ML
LEFT VENTRICULAR INTERNAL DIMENSION IN DIASTOLE: 4.51 CM (ref 3.5–6)
LEFT VENTRICULAR MASS: 96.02 G
LV LATERAL E/E' RATIO: 5.13 M/S
LV SEPTAL E/E' RATIO: 6.83 M/S
MV A" WAVE DURATION": 6.47 MSEC
MV MEAN GRADIENT: 5 MMHG
MV PEAK A VEL: 0.67 M/S
MV PEAK E VEL: 0.41 M/S
MV PEAK GRADIENT: 125 MMHG
MV STENOSIS PRESSURE HALF TIME: 87.59 MS
MV VALVE AREA BY CONTINUITY EQUATION: 0.33 CM2
MV VALVE AREA P 1/2 METHOD: 2.51 CM2
PISA MRMAX VEL: 0.06 M/S
PISA TR MAX VEL: 1.3 M/S
PULM VEIN S/D RATIO: 1.23
PV PEAK D VEL: 0.26 M/S
PV PEAK S VEL: 0.32 M/S
PV PEAK VELOCITY: 0.98 CM/S
RA MAJOR: 4.8 CM
RA PRESSURE: 3 MMHG
RA WIDTH: 3.98 CM
RIGHT VENTRICULAR END-DIASTOLIC DIMENSION: 3.35 CM
RV TISSUE DOPPLER FREE WALL SYSTOLIC VELOCITY 1 (APICAL 4 CHAMBER VIEW): 12.96 CM/S
SINUS: 2.64 CM
STJ: 2.44 CM
TDI LATERAL: 0.08 M/S
TDI SEPTAL: 0.06 M/S
TDI: 0.07 M/S
TR MAX PG: 7 MMHG
TRICUSPID ANNULAR PLANE SYSTOLIC EXCURSION: 2.19 CM
TV REST PULMONARY ARTERY PRESSURE: 10 MMHG

## 2022-04-19 PROCEDURE — 99999 PR PBB SHADOW E&M-EST. PATIENT-LVL IV: CPT | Mod: PBBFAC,,, | Performed by: INTERNAL MEDICINE

## 2022-04-19 PROCEDURE — 93010 RHYTHM STRIP: ICD-10-PCS | Mod: S$PBB,,, | Performed by: INTERNAL MEDICINE

## 2022-04-19 PROCEDURE — 93005 ELECTROCARDIOGRAM TRACING: CPT | Mod: PBBFAC | Performed by: INTERNAL MEDICINE

## 2022-04-19 PROCEDURE — 99205 PR OFFICE/OUTPT VISIT, NEW, LEVL V, 60-74 MIN: ICD-10-PCS | Mod: S$PBB,,, | Performed by: INTERNAL MEDICINE

## 2022-04-19 PROCEDURE — 99999 PR PBB SHADOW E&M-EST. PATIENT-LVL IV: ICD-10-PCS | Mod: PBBFAC,,, | Performed by: INTERNAL MEDICINE

## 2022-04-19 PROCEDURE — 93306 ECHO (CUPID ONLY): ICD-10-PCS | Mod: 26,,, | Performed by: INTERNAL MEDICINE

## 2022-04-19 PROCEDURE — 99214 OFFICE O/P EST MOD 30 MIN: CPT | Mod: PBBFAC,25 | Performed by: INTERNAL MEDICINE

## 2022-04-19 PROCEDURE — 93010 ELECTROCARDIOGRAM REPORT: CPT | Mod: S$PBB,,, | Performed by: INTERNAL MEDICINE

## 2022-04-19 PROCEDURE — 93306 TTE W/DOPPLER COMPLETE: CPT

## 2022-04-19 PROCEDURE — 93306 TTE W/DOPPLER COMPLETE: CPT | Mod: 26,,, | Performed by: INTERNAL MEDICINE

## 2022-04-19 PROCEDURE — 99205 OFFICE O/P NEW HI 60 MIN: CPT | Mod: S$PBB,,, | Performed by: INTERNAL MEDICINE

## 2022-04-19 NOTE — PROGRESS NOTES
Subjective:    Patient ID:  Gabriela Edwards is a 81 y.o. female who presents for evaluation of Palpitations      Patient is a 82 yo F referred here for palpitations. Patient states that this started 2 months prior occurred for a few minutes in the evening 2 nights in a row and never occurred again. This occurred in a setting where she had had an increase in her wine drinking from 1 glass/night to having a few while out with frineds both nights. Patient was seen by PCP who ordered a holter which showed 5% burden of PVCs with different morphologies but notably she did not feel symptoms during this instance. Patient does not have a 12 lead with the PVCs.  ECG today shows sinus bradycardia rate 50bpm normal pr QRS duration 108ms.      Review of Systems   Constitutional: Negative.   HENT: Negative.    Eyes: Negative.    Cardiovascular: Positive for irregular heartbeat.   Respiratory: Negative.    Endocrine: Negative.    Hematologic/Lymphatic: Negative.    Skin: Negative.    Musculoskeletal: Negative.    Gastrointestinal: Negative.    Genitourinary: Negative.    Neurological: Negative.    Psychiatric/Behavioral: Negative.    Allergic/Immunologic: Negative.         Objective:    Physical Exam  Constitutional:       Appearance: Normal appearance. She is normal weight.   HENT:      Head: Normocephalic and atraumatic.      Right Ear: External ear normal.      Left Ear: External ear normal.      Nose: Nose normal.      Mouth/Throat:      Mouth: Mucous membranes are dry.   Eyes:      Extraocular Movements: Extraocular movements intact.      Conjunctiva/sclera: Conjunctivae normal.      Pupils: Pupils are equal, round, and reactive to light.   Cardiovascular:      Rate and Rhythm: Normal rate and regular rhythm.      Pulses: Normal pulses.   Abdominal:      General: Abdomen is flat.   Musculoskeletal:      Cervical back: Normal range of motion.   Neurological:      Mental Status: She is alert.           Assessment:       1.  Frequent PVCs    2. Palpitations         Plan:       Patient is a 82 yo F who had essentially isolated episode of palpitations, irregular heart beat and tachycardia, brief lasting a few minutes at a time. Occurred in setting of increase ETOH intake. Has not recurred since. Did not occur during holter which had PVCs at a 5% burden. Most likely this was isolated atrial fibrillation based on history however no objective evidence of AF. Given this will get an echo and if normal then monitor for now. Stressed avoidance of triggers for atrial fibrillation. If recurs again will order a longer term monitor.

## 2022-04-20 ENCOUNTER — PATIENT MESSAGE (OUTPATIENT)
Dept: ELECTROPHYSIOLOGY | Facility: CLINIC | Age: 82
End: 2022-04-20
Payer: MEDICARE

## 2022-04-22 ENCOUNTER — SPECIALTY PHARMACY (OUTPATIENT)
Dept: PHARMACY | Facility: CLINIC | Age: 82
End: 2022-04-22
Payer: MEDICARE

## 2022-04-22 NOTE — TELEPHONE ENCOUNTER
PA required for dupixent. [facility] to get next injection date. Routing to [facility].

## 2022-04-23 NOTE — TELEPHONE ENCOUNTER
Incoming call from patient. PA required for dupixent, patient due to inject 4/27. Will route to GI/Derm team to submit urgent PA.

## 2022-04-25 NOTE — TELEPHONE ENCOUNTER
"Incoming call from patient inquiring on status of Dupixent. Advised that PA is still pending with insurance.    Attempted to submit PA via CMM (Key: [id number]). Error message received stating "Patient authentication failed. The patient's zip code and/or phone number provided do not match our records." Will follow up with insurance.   "

## 2022-04-26 NOTE — TELEPHONE ENCOUNTER
Specialty Pharmacy - Refill Coordination  Specialty Pharmacy - Medication/Referral Authorization    Specialty Medication Orders Linked to Encounter    Flowsheet Row Most Recent Value   Medication #1 dupilumab (DUPIXENT SYRINGE) 300 mg/2 mL Syrg (Order#[id number], Rx#[id number])          Refill Questions - Documented Responses    Flowsheet Row Most Recent Value   Patient Availability and HIPAA Verification    Does patient want to proceed with activity? Yes   HIPAA/medical authority confirmed? Yes   Relationship to patient of person spoken to? Self   Refill Screening Questions    Changes to allergies? No   Changes to medications? No   New conditions since last clinic visit? No   Unplanned office visit, urgent care, ED, or hospital admission in the last 4 weeks? No   How does patient/caregiver feel medication is working? Excellent   Financial problems or insurance changes? No   How many doses of your specialty medications were missed in the last 4 weeks? 0   Would patient like to speak to a pharmacist? No   When does the patient need to receive the medication? 04/27/22   Refill Delivery Questions    How will the patient receive the medication? Delivery Anne-Marie   When does the patient need to receive the medication? 04/27/22   Shipping Address Home   Address in [facility] confirmed and updated if neccessary? Yes   Expected Copay ($) 80   Is the patient able to afford the medication copay? Yes   Payment Method CC on file   Days supply of Refill 28   Supplies needed? Alcohol Swabs   Refill activity completed? Yes   Refill activity plan Refill scheduled   Shipment/Pickup Date: 04/27/22          Current Outpatient Medications   Medication Sig    dupilumab (DUPIXENT SYRINGE) 300 mg/2 mL Syrg Inject 2 mLs (300 mg total) into the skin every 14 (fourteen) days. Start 14 days after initial 600mg dose    levetiracetam (KEPPRA XR) 500 mg Tb24 Take 500 mg by mouth every evening. Pt takes 5 tablets nightly for a total of  2500mg    levocetirizine (XYZAL) 5 MG tablet Take 2 tablets (10 mg total) by mouth 2 (two) times daily. For chronic urticaria    mometasone (NASONEX) 50 mcg/actuation nasal spray 2 sprays by Nasal route once daily.    neomycin-polymyxin-dexamethasone (DEXACINE) 3.5 mg/g-10,000 unit/g-0.1 % Oint Place into both eyes 2 (two) times a day.    olopatadine (PATANOL) 0.1 % ophthalmic solution INSTILL 1 DROP IN BOTH EYES TWICE DAILY    olopatadine (PATANOL) 0.1 % ophthalmic solution Place 1 drop into both eyes 2 (two) times daily. As needed    tacrolimus (PROTOPIC) 0.1 % ointment Apply topically 2 (two) times daily. To affected areas as needed    triamcinolone acetonide 0.1% (KENALOG) 0.1 % cream Apply topically.   Last reviewed on 4/19/2022  9:16 AM by Sea Cooley MD    Review of patient's allergies indicates:   Allergen Reactions    House dust mite Hives, Itching, Rash and Swelling    Last reviewed on  4/19/2022 8:41 AM by Gila Sarmiento      Tasks added this encounter   5/18/2022 - Refill Call (Auto Added)   Tasks due within next 3 months   No tasks due.     Ervin Torres, PharmD  [facility]  [address] [facility] [address]  Phone: [phone]  Fax: [phone]

## 2022-04-26 NOTE — TELEPHONE ENCOUNTER
Dupixent PA completed on the phone with insurance rep. Dupixent is approved from 3/27/22 to 4/26/23. Case ID: [id number].

## 2022-05-18 ENCOUNTER — SPECIALTY PHARMACY (OUTPATIENT)
Dept: PHARMACY | Facility: CLINIC | Age: 82
End: 2022-05-18
Payer: MEDICARE

## 2022-05-18 NOTE — TELEPHONE ENCOUNTER
Specialty Pharmacy - Refill Coordination    Specialty Medication Orders Linked to Encounter    Flowsheet Row Most Recent Value   Medication #1 dupilumab (DUPIXENT SYRINGE) 300 mg/2 mL Syrg (Order#[id number], Rx#[id number])          Refill Questions - Documented Responses    Flowsheet Row Most Recent Value   Patient Availability and HIPAA Verification    Does patient want to proceed with activity? Yes   HIPAA/medical authority confirmed? Yes   Relationship to patient of person spoken to? Self   Refill Screening Questions    Changes to allergies? No   Changes to medications? No   New conditions since last clinic visit? No   Unplanned office visit, urgent care, ED, or hospital admission in the last 4 weeks? No   How does patient/caregiver feel medication is working? Good   Financial problems or insurance changes? No   How many doses of your specialty medications were missed in the last 4 weeks? 0   Would patient like to speak to a pharmacist? No   When does the patient need to receive the medication? 05/25/22   Refill Delivery Questions    How will the patient receive the medication? Delivery Anne-Marie   When does the patient need to receive the medication? 05/25/22   Shipping Address Home   Address in [facility] confirmed and updated if neccessary? Yes   Expected Copay ($) 80   Is the patient able to afford the medication copay? No   Payment Method zero copay   Days supply of Refill 28   Supplies needed? No supplies needed   Refill activity completed? Yes   Refill activity plan Refill scheduled   Shipment/Pickup Date: 05/24/22          Current Outpatient Medications   Medication Sig    dupilumab (DUPIXENT SYRINGE) 300 mg/2 mL Syrg Inject 2 mLs (300 mg total) into the skin every 14 (fourteen) days. Start 14 days after initial 600mg dose    levetiracetam (KEPPRA XR) 500 mg Tb24 Take 500 mg by mouth every evening. Pt takes 5 tablets nightly for a total of 2500mg    levocetirizine (XYZAL) 5 MG tablet Take 2 tablets  (10 mg total) by mouth 2 (two) times daily. For chronic urticaria    mometasone (NASONEX) 50 mcg/actuation nasal spray 2 sprays by Nasal route once daily.    neomycin-polymyxin-dexamethasone (DEXACINE) 3.5 mg/g-10,000 unit/g-0.1 % Oint Place into both eyes 2 (two) times a day.    olopatadine (PATANOL) 0.1 % ophthalmic solution INSTILL 1 DROP IN BOTH EYES TWICE DAILY    olopatadine (PATANOL) 0.1 % ophthalmic solution Place 1 drop into both eyes 2 (two) times daily. As needed    tacrolimus (PROTOPIC) 0.1 % ointment Apply topically 2 (two) times daily. To affected areas as needed    triamcinolone acetonide 0.1% (KENALOG) 0.1 % cream Apply topically.   Last reviewed on 4/19/2022  9:16 AM by Sea Cooley MD    Review of patient's allergies indicates:   Allergen Reactions    House dust mite Hives, Itching, Rash and Swelling    Last reviewed on  4/19/2022 8:41 AM by Gila Sarmiento      Tasks added this encounter   5/18/2022 - Referral Authorization - Refill Call  6/15/2022 - Refill Call (Auto Added)   Tasks due within next 3 months   No tasks due.     Lisa Berkowitz, Patient Care Assistant  [facility]  [address]  [facility] [address]  Phone: [phone]  Fax: [phone]

## 2022-05-19 ENCOUNTER — TELEPHONE (OUTPATIENT)
Dept: INTERNAL MEDICINE | Facility: CLINIC | Age: 82
End: 2022-05-19
Payer: MEDICARE

## 2022-05-19 ENCOUNTER — TELEPHONE (OUTPATIENT)
Dept: ALLERGY | Facility: CLINIC | Age: 82
End: 2022-05-19
Payer: MEDICARE

## 2022-05-19 NOTE — TELEPHONE ENCOUNTER
"Pt is calling because she feels she is experiencing side effects from Dupixent and injectable medication that she administers herself in her thigh. It was prescribed by her immunologist Dr Kahn. She states that she is having pain in her left knee through her lt hip and the area in between is "bloated".   "

## 2022-05-19 NOTE — TELEPHONE ENCOUNTER
Returned patient's call. Patient stated that she is has been having reactions from dupixent.  Pt describes a feeling of being bloated and gassy on one side. Pt also states that she is having swelling in her joints to the left side and is unable to walk without using a cane. Offered an appointment today. Pt declined appointment today stating that she can not drive and does not have a ride. Appointment made Monday at 10:30.

## 2022-05-19 NOTE — TELEPHONE ENCOUNTER
----- Message from Andrea Marte sent at 5/19/2022  9:54 AM CDT -----  Contact: [phone]  Pt is calling in, she would like a call back, she said she is swollen and the medicine she has isn't working, please return call, thanks

## 2022-05-19 NOTE — TELEPHONE ENCOUNTER
----- Message from Alina Castellanos sent at 5/19/2022  8:07 AM CDT -----  Contact: self/[phone]  .1 Patient would like to get medical advice.  Symptoms (please be specific): bloated from the right knee to the waist  How long has patient had these symptoms: 05/16/22  Pharmacy name and phone#:[id number] - [place], [place] - [address] AT [facility] [address]   Phone:  [phone]  Fax:  [phone]  Any drug allergies: on file  Comments: Patient would like to get medical advice. Patient stated that Dr Kahn gave her a pill that side effect is bloating - pantoprazole 40 mg, and since then has been bloated, would like to get an advise from Dr Cruz. Please call and advise. Thank you

## 2022-05-26 ENCOUNTER — OFFICE VISIT (OUTPATIENT)
Dept: INTERNAL MEDICINE | Facility: CLINIC | Age: 82
End: 2022-05-26
Payer: MEDICARE

## 2022-05-26 ENCOUNTER — LAB VISIT (OUTPATIENT)
Dept: LAB | Facility: HOSPITAL | Age: 82
End: 2022-05-26
Payer: MEDICARE

## 2022-05-26 VITALS
HEIGHT: 64 IN | WEIGHT: 130.5 LBS | BODY MASS INDEX: 22.28 KG/M2 | OXYGEN SATURATION: 98 % | HEART RATE: 59 BPM | DIASTOLIC BLOOD PRESSURE: 80 MMHG | SYSTOLIC BLOOD PRESSURE: 118 MMHG

## 2022-05-26 DIAGNOSIS — R10.13 DYSPEPSIA: ICD-10-CM

## 2022-05-26 DIAGNOSIS — R10.13 DYSPEPSIA: Primary | ICD-10-CM

## 2022-05-26 LAB
ALBUMIN SERPL BCP-MCNC: 3.9 G/DL (ref 3.5–5.2)
ALP SERPL-CCNC: 47 U/L (ref 55–135)
ALT SERPL W/O P-5'-P-CCNC: 14 U/L (ref 10–44)
ANION GAP SERPL CALC-SCNC: 9 MMOL/L (ref 8–16)
AST SERPL-CCNC: 19 U/L (ref 10–40)
BILIRUB SERPL-MCNC: 0.3 MG/DL (ref 0.1–1)
BUN SERPL-MCNC: 19 MG/DL (ref 8–23)
CALCIUM SERPL-MCNC: 10 MG/DL (ref 8.7–10.5)
CHLORIDE SERPL-SCNC: 102 MMOL/L (ref 95–110)
CO2 SERPL-SCNC: 28 MMOL/L (ref 23–29)
CREAT SERPL-MCNC: 0.8 MG/DL (ref 0.5–1.4)
EST. GFR  (AFRICAN AMERICAN): >60 ML/MIN/1.73 M^2
EST. GFR  (NON AFRICAN AMERICAN): >60 ML/MIN/1.73 M^2
GLUCOSE SERPL-MCNC: 98 MG/DL (ref 70–110)
POTASSIUM SERPL-SCNC: 4.8 MMOL/L (ref 3.5–5.1)
PROT SERPL-MCNC: 7.6 G/DL (ref 6–8.4)
SODIUM SERPL-SCNC: 139 MMOL/L (ref 136–145)

## 2022-05-26 PROCEDURE — 99999 PR PBB SHADOW E&M-EST. PATIENT-LVL III: CPT | Mod: PBBFAC,GC,, | Performed by: STUDENT IN AN ORGANIZED HEALTH CARE EDUCATION/TRAINING PROGRAM

## 2022-05-26 PROCEDURE — 80053 COMPREHEN METABOLIC PANEL: CPT | Performed by: STUDENT IN AN ORGANIZED HEALTH CARE EDUCATION/TRAINING PROGRAM

## 2022-05-26 PROCEDURE — 99213 PR OFFICE/OUTPT VISIT, EST, LEVL III, 20-29 MIN: ICD-10-PCS | Mod: S$PBB,GC,, | Performed by: STUDENT IN AN ORGANIZED HEALTH CARE EDUCATION/TRAINING PROGRAM

## 2022-05-26 PROCEDURE — 99213 OFFICE O/P EST LOW 20 MIN: CPT | Mod: PBBFAC | Performed by: STUDENT IN AN ORGANIZED HEALTH CARE EDUCATION/TRAINING PROGRAM

## 2022-05-26 PROCEDURE — 99213 OFFICE O/P EST LOW 20 MIN: CPT | Mod: S$PBB,GC,, | Performed by: STUDENT IN AN ORGANIZED HEALTH CARE EDUCATION/TRAINING PROGRAM

## 2022-05-26 PROCEDURE — 36415 COLL VENOUS BLD VENIPUNCTURE: CPT | Performed by: STUDENT IN AN ORGANIZED HEALTH CARE EDUCATION/TRAINING PROGRAM

## 2022-05-26 PROCEDURE — 99999 PR PBB SHADOW E&M-EST. PATIENT-LVL III: ICD-10-PCS | Mod: PBBFAC,GC,, | Performed by: STUDENT IN AN ORGANIZED HEALTH CARE EDUCATION/TRAINING PROGRAM

## 2022-05-26 RX ORDER — PANTOPRAZOLE SODIUM 40 MG/1
40 TABLET, DELAYED RELEASE ORAL DAILY
Qty: 30 TABLET | Refills: 11 | Status: SHIPPED | OUTPATIENT
Start: 2022-05-26 | End: 2022-08-30

## 2022-05-26 RX ORDER — DICYCLOMINE HYDROCHLORIDE 20 MG/1
20 TABLET ORAL 2 TIMES DAILY PRN
Qty: 120 TABLET | Refills: 0 | Status: SHIPPED | OUTPATIENT
Start: 2022-05-26 | End: 2022-06-25

## 2022-05-26 NOTE — PROGRESS NOTES
Subjective     Chief Complaint: stomach cramps    History of Present Illness:  Ms. Gabriela Edwards is a 81 y.o. female with past medical history of eczema on Dupixent presents due to severe abdominal pain, now resolved.  Patient reports she developed severe abdominal pain a few days ago.  Described as severe, generalized, seems like gas, radiating up to her chest and down legs, unable to bend over during episodes. Pain is not related to eating food. She has had no relief with over-the-counter Gas-X.  She also reports early satiety and postprandial fullness over the past year.  She has had an episode like this 12 years ago at an outside hospital.  At that time she was admitted and they were unable to diagnose the problem.  She does not remember if she had EGD or colonoscopy.  Reports a colonoscopy a few years ago which was self-reported negative.  She is not currently on any PPIs. No systemic symptoms such as fever.  Denies nausea, vomiting, diarrhea, constipation, history of kidney stones, dysuria.    Review of Systems   Constitutional: Negative for fever and weight loss.   HENT: Negative for congestion and sore throat.    Eyes: Negative for blurred vision and pain.   Respiratory: Negative for cough and shortness of breath.    Cardiovascular: Negative for chest pain, orthopnea and leg swelling.   Gastrointestinal: Negative for abdominal pain, constipation, diarrhea, nausea and vomiting.   Genitourinary: Negative for dysuria and hematuria.   Neurological: Negative for weakness and headaches.       PAST HISTORY:     Past Medical History:   Diagnosis Date    Cataract     Choroidal nevus     Eczema     Encounter for blood transfusion     Epilepsy     H/O right wrist surgery 2009    Nuclear sclerosis, bilateral 8/19/2019    Nuclear sclerotic cataract of left eye 9/30/2019    Transfusion reaction     h/o hepatitis that was treated     Urticaria        Past Surgical History:   Procedure Laterality Date     CATARACT EXTRACTION W/  INTRAOCULAR LENS IMPLANT Right 08/19/2019    Dr. Freeman    CATARACT EXTRACTION W/  INTRAOCULAR LENS IMPLANT Right 08/19/2019    Procedure: EXTRACTION, CATARACT, WITH IOL INSERTION;  Surgeon: Maryuri Freeman MD;  Location: [facility];  Service: Ophthalmology;  Laterality: Right;  Laser    CATARACT EXTRACTION W/  INTRAOCULAR LENS IMPLANT Left 09/30/2019    Procedure: EXTRACTION, CATARACT, WITH IOL INSERTION;  Surgeon: Maryuri Freeman MD;  Location: [facility];  Service: Ophthalmology;  Laterality: Left;  LASER ASSISTED    NOSE SURGERY      WRIST SURGERY Right     2009       MEDICATIONS & ALLERGIES:     Current Outpatient Medications on File Prior to Visit   Medication Sig    dupilumab (DUPIXENT SYRINGE) 300 mg/2 mL Syrg Inject 2 mLs (300 mg total) into the skin every 14 (fourteen) days. Start 14 days after initial 600mg dose    levetiracetam (KEPPRA XR) 500 mg Tb24 Take 500 mg by mouth every evening. Pt takes 5 tablets nightly for a total of 2500mg    levocetirizine (XYZAL) 5 MG tablet Take 2 tablets (10 mg total) by mouth 2 (two) times daily. For chronic urticaria    mometasone (NASONEX) 50 mcg/actuation nasal spray 2 sprays by Nasal route once daily.    neomycin-polymyxin-dexamethasone (DEXACINE) 3.5 mg/g-10,000 unit/g-0.1 % Oint Place into both eyes 2 (two) times a day.    olopatadine (PATANOL) 0.1 % ophthalmic solution INSTILL 1 DROP IN BOTH EYES TWICE DAILY    olopatadine (PATANOL) 0.1 % ophthalmic solution Place 1 drop into both eyes 2 (two) times daily. As needed    tacrolimus (PROTOPIC) 0.1 % ointment Apply topically 2 (two) times daily. To affected areas as needed    triamcinolone acetonide 0.1% (KENALOG) 0.1 % cream Apply topically.     No current facility-administered medications on file prior to visit.       Review of patient's allergies indicates:   Allergen Reactions    House dust mite Hives, Itching, Rash and Swelling       OBJECTIVE:     Vital Signs:  Vitals:    05/26/22  "1423   BP: 118/80   BP Location: Left arm   Patient Position: Sitting   BP Method: Medium (Manual)   Pulse: (!) 59   SpO2: 98%   Weight: 59.2 kg (130 lb 8.2 oz)   Height: 5' 4" (1.626 m)       Body mass index is 22.4 kg/m².     Physical Exam  HENT:      Head: Normocephalic and atraumatic.   Eyes:      Conjunctiva/sclera: Conjunctivae normal.   Cardiovascular:      Rate and Rhythm: Normal rate and regular rhythm.      Heart sounds: Normal heart sounds.   Pulmonary:      Effort: Pulmonary effort is normal. No respiratory distress.      Breath sounds: Normal breath sounds. No wheezing.   Abdominal:      General: Abdomen is flat. Bowel sounds are normal. There is no distension.      Palpations: Abdomen is soft.      Tenderness: There is no abdominal tenderness. There is no guarding or rebound.      Comments: No right upper quadrant tenderness, no CVA tenderness.   Musculoskeletal:      Cervical back: Normal range of motion.      Right lower leg: No edema.      Left lower leg: No edema.   Skin:     Capillary Refill: Capillary refill takes less than 2 seconds.      Findings: No rash.   Neurological:      General: No focal deficit present.      Mental Status: She is alert and oriented to person, place, and time.   Psychiatric:         Mood and Affect: Mood and affect normal.         Behavior: Behavior normal.         Laboratory  Lab Results   Component Value Date    WBC 4.67 03/18/2022    HGB 12.7 03/18/2022    HCT 40.1 03/18/2022    MCV 93 03/18/2022     03/18/2022     BMP  Lab Results   Component Value Date     05/26/2022    K 4.8 05/26/2022     05/26/2022    CO2 28 05/26/2022    BUN 19 05/26/2022    CREATININE 0.8 05/26/2022    CALCIUM 10.0 05/26/2022    ANIONGAP 9 05/26/2022    ESTGFRAFRICA >60.0 05/26/2022    EGFRNONAA >60.0 05/26/2022     No results found for: INR, PROTIME  Lab Results   Component Value Date    HGBA1C 5.5 10/21/2020     No results for input(s): POCTGLUCOSE in the last 72 " hours.    Diagnostic Results:  Labs: Reviewed    Health Maintenance Due   Topic Date Due    COVID-19 Vaccine (4 - Booster for Pfizer series) 12/17/2021         ASSESSMENT & PLAN:   Ms. Garbiela Edwards is a 81 y.o. female here due to an episode of severe abdominal pain.  Patient reports it feels primarily like gas which radiates essentially all over her body.  She is debilitated during these episodes.  Her symptoms are now resolved.  More concerning she reports a history of dyspepsia like symptoms with early satiety and postprandial fullness.  Overall her diagnosis unclear.  Differential diagnosis include dyspepsia, reflux, small intestine bacterial overgrowth.  Felt less likely that this is gallstones given no relation to food.  And these symptoms would be unusual for kidney stones given there bilateral more crampy in nature.    Dyspepsia  -  Ambulatory referral/consult to Gastroenterology; Future; Expected date: 06/02/2022  -  referring patient to GI for upper endoscopy given age and dyspepsia symptoms per the American College of Gastroenterology guidelines: Jovanny P, Cintia BE, Wesley CN, Bette RA, Chapincito CW, Kesha N. ACG and CAG Clinical Guideline: Management of Dyspepsia. Am J Gastroenterol. 2017 Jul;112(7):988-1013. doi: 10.1038/ajg.2017.154. Epub 2017 Jun 20. Erratum in: Am J Gastroenterol. 2017 Sep;112(9):1484. PMID: 43315723.  - will treat with PPI for 6-8 weeks and/or until upper endoscopy is complete  - told to use Bentyl p.r.n. for cramps.  - discussed with patient that I felt gallstones were less likely but we could perform a right upper quadrant ultrasound.  However patient is requesting to hold off on ultrasound and pursue GI consult with upper endoscopy 1st.  Will obtain ultrasound if further symptoms or concerns.  Will obtain CMP to assess alk-phos and LFTs.    Other orders  -     pantoprazole (PROTONIX) 40 MG tablet; Take 1 tablet (40 mg total) by mouth once daily.  Dispense: 30 tablet; Refill:  11  -     dicyclomine (BENTYL) 20 mg tablet; Take 1 tablet (20 mg total) by mouth 2 (two) times daily as needed (stomach cramping).  Dispense: 120 tablet; Refill: 0        RTC in 2 weeks with PCP    Discussed with Dr. Levy  - staff attestation to follow      Kvng Cruzor, MD  Internal Medicine PGY3  [facility]  [address] [place] [address] [phone]

## 2022-06-01 ENCOUNTER — TELEPHONE (OUTPATIENT)
Dept: GASTROENTEROLOGY | Facility: CLINIC | Age: 82
End: 2022-06-01
Payer: MEDICARE

## 2022-06-01 NOTE — TELEPHONE ENCOUNTER
----- Message from Patti Guerra sent at 6/1/2022  2:21 PM CDT -----  Regarding: speak with nurse  Contact: patient  [phone]    please call patient said she was given medication to hold her over to get in but will be ut soon would like sooner appointment with the doctor waiting on a call back thanks.

## 2022-06-01 NOTE — TELEPHONE ENCOUNTER
Return call and spoke with patient. Inform patient of the next available GI provider appointment. Patient schedule with Dr. Borges.     Patient verbalized understanding.

## 2022-06-14 ENCOUNTER — SPECIALTY PHARMACY (OUTPATIENT)
Dept: PHARMACY | Facility: CLINIC | Age: 82
End: 2022-06-14
Payer: MEDICARE

## 2022-06-14 ENCOUNTER — TELEPHONE (OUTPATIENT)
Dept: NEUROLOGY | Facility: CLINIC | Age: 82
End: 2022-06-14
Payer: MEDICARE

## 2022-06-14 DIAGNOSIS — L20.9 ATOPIC DERMATITIS, UNSPECIFIED TYPE: Primary | ICD-10-CM

## 2022-06-14 NOTE — TELEPHONE ENCOUNTER
Specialty Pharmacy - Clinical Reassessment    Specialty Medication Orders Linked to Encounter    Flowsheet Row Most Recent Value   Medication #1 dupilumab (DUPIXENT SYRINGE) 300 mg/2 mL Syrg (Order#[id number], Rx#[id number])        Patient Diagnosis   L30.9 - Eczema    Specialty clinical pharmacist review completed for an annual review of reassessment. Reviewed the following areas: current med list, reports of adverse effects, adherence and progress towards therapeutic goals.    Recommendations: none at this time.    Tasks added this encounter   3/14/2023 - Clinical - Follow Up Assesement (Annual)   Tasks due within next 3 months   6/15/2022 - Refill Call (Auto Added)     Maribeth Amor, PharmD  [facility]  [address]  [facility] [address]  Phone: [phone]  Fax: [phone]

## 2022-06-14 NOTE — TELEPHONE ENCOUNTER
----- Message from Marija Robison sent at 6/14/2022 10:50 AM CDT -----  Regarding: call back  Contact: [phone]  Type:  Sooner Apoointment Request    Caller is requesting a sooner appointment.  Caller declined first available appointment listed below.  Caller will not accept being placed on the waitlist and is requesting a message be sent to doctor.  Name of Caller: PT   When is the first available appointment? September   Symptoms: Epilepsy   Would the patient rather a call back or a response via [facility]? Call back   Best Call Back Number: [phone]  Additional Information:

## 2022-06-15 ENCOUNTER — SPECIALTY PHARMACY (OUTPATIENT)
Dept: PHARMACY | Facility: CLINIC | Age: 82
End: 2022-06-15
Payer: MEDICARE

## 2022-06-15 NOTE — TELEPHONE ENCOUNTER
Specialty Pharmacy - Refill Coordination    Specialty Medication Orders Linked to Encounter    Flowsheet Row Most Recent Value   Medication #1 dupilumab (DUPIXENT SYRINGE) 300 mg/2 mL Syrg (Order#[id number], Rx#[id number])          Refill Questions - Documented Responses    Flowsheet Row Most Recent Value   Patient Availability and HIPAA Verification    Does patient want to proceed with activity? Yes   HIPAA/medical authority confirmed? Yes   Relationship to patient of person spoken to? Self   Refill Screening Questions    Changes to allergies? No   Changes to medications? No   New conditions since last clinic visit? No   Unplanned office visit, urgent care, ED, or hospital admission in the last 4 weeks? No   How does patient/caregiver feel medication is working? Good   Financial problems or insurance changes? No   How many doses of your specialty medications were missed in the last 4 weeks? 0   Would patient like to speak to a pharmacist? No   When does the patient need to receive the medication? 06/22/22   Refill Delivery Questions    How will the patient receive the medication? Delivery Anne-Marie   When does the patient need to receive the medication? 06/22/22   Shipping Address Home   Address in [facility] confirmed and updated if neccessary? Yes   Expected Copay ($) 80   Is the patient able to afford the medication copay? Yes   Payment Method CC on file   Days supply of Refill 28   Supplies needed? No supplies needed   Refill activity completed? Yes   Refill activity plan Refill scheduled   Shipment/Pickup Date: 06/20/22          Current Outpatient Medications   Medication Sig    dicyclomine (BENTYL) 20 mg tablet Take 1 tablet (20 mg total) by mouth 2 (two) times daily as needed (stomach cramping).    dupilumab (DUPIXENT SYRINGE) 300 mg/2 mL Syrg Inject 2 mLs (300 mg total) into the skin every 14 (fourteen) days. Start 14 days after initial 600mg dose    levetiracetam (KEPPRA XR) 500 mg Tb24 Take 500 mg by  mouth every evening. Pt takes 5 tablets nightly for a total of 2500mg    levocetirizine (XYZAL) 5 MG tablet TAKE 2 TABLETS(10 MG) BY MOUTH TWICE DAILY FOR CHRONIC URTICARIA    mometasone (NASONEX) 50 mcg/actuation nasal spray 2 sprays by Nasal route once daily.    neomycin-polymyxin-dexamethasone (DEXACINE) 3.5 mg/g-10,000 unit/g-0.1 % Oint Place into both eyes 2 (two) times a day.    olopatadine (PATANOL) 0.1 % ophthalmic solution INSTILL 1 DROP IN BOTH EYES TWICE DAILY    olopatadine (PATANOL) 0.1 % ophthalmic solution Place 1 drop into both eyes 2 (two) times daily. As needed    pantoprazole (PROTONIX) 40 MG tablet Take 1 tablet (40 mg total) by mouth once daily.    tacrolimus (PROTOPIC) 0.1 % ointment Apply topically 2 (two) times daily. To affected areas as needed    triamcinolone acetonide 0.1% (KENALOG) 0.1 % cream Apply topically.   Last reviewed on 4/19/2022  9:16 AM by Sea Cooley MD    Review of patient's allergies indicates:   Allergen Reactions    House dust mite Hives, Itching, Rash and Swelling    Last reviewed on  5/26/2022 2:22 PM by Humberto Li      Tasks added this encounter   7/13/2022 - Refill Call (Auto Added)   Tasks due within next 3 months   No tasks due.     Kalyani Wilkes  [facility]  [address] [facility] [address]  Phone: [phone]  Fax: [phone]

## 2022-07-13 ENCOUNTER — SPECIALTY PHARMACY (OUTPATIENT)
Dept: PHARMACY | Facility: CLINIC | Age: 82
End: 2022-07-13
Payer: MEDICARE

## 2022-07-13 ENCOUNTER — PATIENT MESSAGE (OUTPATIENT)
Dept: PHARMACY | Facility: CLINIC | Age: 82
End: 2022-07-13
Payer: MEDICARE

## 2022-07-13 NOTE — TELEPHONE ENCOUNTER
Specialty Pharmacy - Refill Coordination    Specialty Medication Orders Linked to Encounter    Flowsheet Row Most Recent Value   Medication #1 dupilumab (DUPIXENT SYRINGE) 300 mg/2 mL Syrg (Order#[id number], Rx#[id number])        Refill Questions - Documented Responses    Flowsheet Row Most Recent Value   Patient Availability and HIPAA Verification    Does patient want to proceed with activity? Yes   HIPAA/medical authority confirmed? Yes   Relationship to patient of person spoken to? Self   Refill Screening Questions    Changes to allergies? No   Changes to medications? No   New conditions since last clinic visit? No   Unplanned office visit, urgent care, ED, or hospital admission in the last 4 weeks? No   How does patient/caregiver feel medication is working? Excellent   Financial problems or insurance changes? No   How many doses of your specialty medications were missed in the last 4 weeks? 0   Would patient like to speak to a pharmacist? No   When does the patient need to receive the medication? 07/20/22   Refill Delivery Questions    How will the patient receive the medication? Delivery Anne-Marie   When does the patient need to receive the medication? 07/20/22   Shipping Address Home   Address in [facility] confirmed and updated if neccessary? Yes   Expected Copay ($) 80   Is the patient able to afford the medication copay? Yes   Payment Method CC on file   Days supply of Refill 28   Supplies needed? Injection Device   Refill activity completed? Yes   Refill activity plan Refill scheduled   Shipment/Pickup Date: 07/18/22        Current Outpatient Medications   Medication Sig    dupilumab (DUPIXENT SYRINGE) 300 mg/2 mL Syrg Inject 2 mLs (300 mg total) into the skin every 14 (fourteen) days. Start 14 days after initial 600mg dose    levetiracetam (KEPPRA XR) 500 mg Tb24 Take 500 mg by mouth every evening. Pt takes 5 tablets nightly for a total of 2500mg    levocetirizine (XYZAL) 5 MG tablet TAKE 2  TABLETS(10 MG) BY MOUTH TWICE DAILY FOR CHRONIC URTICARIA    mometasone (NASONEX) 50 mcg/actuation nasal spray 2 sprays by Nasal route once daily.    neomycin-polymyxin-dexamethasone (DEXACINE) 3.5 mg/g-10,000 unit/g-0.1 % Oint Place into both eyes 2 (two) times a day.    olopatadine (PATANOL) 0.1 % ophthalmic solution INSTILL 1 DROP IN BOTH EYES TWICE DAILY    olopatadine (PATANOL) 0.1 % ophthalmic solution Place 1 drop into both eyes 2 (two) times daily. As needed    pantoprazole (PROTONIX) 40 MG tablet Take 1 tablet (40 mg total) by mouth once daily.    tacrolimus (PROTOPIC) 0.1 % ointment Apply topically 2 (two) times daily. To affected areas as needed    triamcinolone acetonide 0.1% (KENALOG) 0.1 % cream Apply topically.   Last reviewed on 4/19/2022  9:16 AM by Sea Cooley MD    Review of patient's allergies indicates:   Allergen Reactions    House dust mite Hives, Itching, Rash and Swelling    Last reviewed on  5/26/2022 2:22 PM by Humberto Li    Tasks added this encounter   8/10/2022 - Refill Call (Auto Added)   Tasks due within next 3 months   No tasks due.     Sabino Cordova, PharmD  [facility]  [address] [place], [place] [address]  [facility] [address]  Phone: [phone]  Fax: [phone]

## 2022-07-27 ENCOUNTER — TELEPHONE (OUTPATIENT)
Dept: NEUROLOGY | Facility: CLINIC | Age: 82
End: 2022-07-27
Payer: MEDICARE

## 2022-07-27 NOTE — TELEPHONE ENCOUNTER
Chart reviewed. Pt has been followed externally for epilepsy mgmt (Dr. Amanda rothman/ [facility]). She has been maintained on Keppra 500mg XR tablets (noted to be brand name necessary in the chart). She would like to transition her care to [facility]. All her other providers are here.    Currently scheduled to see Dr. Shipley in March 2023. Updated appt offered/accepted for Aug 30th @ 1:00pm with Dr. Cintron at [facility]. Appt details viewable in portal and appt letter placed in the mail.

## 2022-07-27 NOTE — TELEPHONE ENCOUNTER
----- Message from Maureen Yañez sent at 6/14/2022  3:57 PM CDT -----  Contact: patient  Patient calling in regards to missing a call from Shobha Ferrera MA. Requesting call back      Patient @[phone] ([place])

## 2022-08-10 ENCOUNTER — SPECIALTY PHARMACY (OUTPATIENT)
Dept: PHARMACY | Facility: CLINIC | Age: 82
End: 2022-08-10
Payer: MEDICARE

## 2022-08-10 DIAGNOSIS — L20.9 ATOPIC DERMATITIS, UNSPECIFIED TYPE: ICD-10-CM

## 2022-08-10 RX ORDER — DUPILUMAB 300 MG/2ML
300 INJECTION, SOLUTION SUBCUTANEOUS
Qty: 4 ML | Refills: 11 | Status: SHIPPED | OUTPATIENT
Start: 2022-08-10 | End: 2023-07-10 | Stop reason: SDUPTHER

## 2022-08-12 ENCOUNTER — PATIENT MESSAGE (OUTPATIENT)
Dept: PHARMACY | Facility: CLINIC | Age: 82
End: 2022-08-12
Payer: MEDICARE

## 2022-08-12 NOTE — TELEPHONE ENCOUNTER
Specialty Pharmacy - Refill Coordination    Specialty Medication Orders Linked to Encounter    Flowsheet Row Most Recent Value   Medication #1 dupilumab (DUPIXENT SYRINGE) 300 mg/2 mL Syrg (Order#[id number], Rx#[id number])          Refill Questions - Documented Responses    Flowsheet Row Most Recent Value   Patient Availability and HIPAA Verification    Does patient want to proceed with activity? Yes   HIPAA/medical authority confirmed? Yes   Relationship to patient of person spoken to? Self   Refill Screening Questions    Changes to allergies? No   Changes to medications? No   New conditions since last clinic visit? No   Unplanned office visit, urgent care, ED, or hospital admission in the last 4 weeks? No   How does patient/caregiver feel medication is working? Excellent   Financial problems or insurance changes? No   How many doses of your specialty medications were missed in the last 4 weeks? 0   Would patient like to speak to a pharmacist? No   When does the patient need to receive the medication? 08/17/22   Refill Delivery Questions    How will the patient receive the medication? Delivery Anne-Marie   When does the patient need to receive the medication? 08/17/22   Shipping Address Home   Address in [facility] confirmed and updated if neccessary? Yes   Expected Copay ($) 80   Is the patient able to afford the medication copay? Yes   Payment Method CC on file   Days supply of Refill 28   Supplies needed? No supplies needed   Refill activity completed? Yes   Refill activity plan Refill scheduled   Shipment/Pickup Date: 08/15/22          Current Outpatient Medications   Medication Sig    dupilumab (DUPIXENT SYRINGE) 300 mg/2 mL Syrg Inject 2 mLs (300 mg total) into the skin every 14 (fourteen) days. Start 14 days after initial 600mg dose    levetiracetam (KEPPRA XR) 500 mg Tb24 Take 500 mg by mouth every evening. Pt takes 5 tablets nightly for a total of 2500mg    levocetirizine (XYZAL) 5 MG tablet TAKE 2  TABLETS(10 MG) BY MOUTH TWICE DAILY FOR CHRONIC URTICARIA    mometasone (NASONEX) 50 mcg/actuation nasal spray 2 sprays by Nasal route once daily.    neomycin-polymyxin-dexamethasone (DEXACINE) 3.5 mg/g-10,000 unit/g-0.1 % Oint Place into both eyes 2 (two) times a day.    olopatadine (PATANOL) 0.1 % ophthalmic solution INSTILL 1 DROP IN BOTH EYES TWICE DAILY    olopatadine (PATANOL) 0.1 % ophthalmic solution Place 1 drop into both eyes 2 (two) times daily. As needed    pantoprazole (PROTONIX) 40 MG tablet Take 1 tablet (40 mg total) by mouth once daily.    tacrolimus (PROTOPIC) 0.1 % ointment Apply topically 2 (two) times daily. To affected areas as needed    triamcinolone acetonide 0.1% (KENALOG) 0.1 % cream Apply topically.   Last reviewed on 4/19/2022  9:16 AM by Sea Cooley MD    Review of patient's allergies indicates:   Allergen Reactions    House dust mite Hives, Itching, Rash and Swelling    Last reviewed on  5/26/2022 2:22 PM by Humberto Li      Tasks added this encounter   9/7/2022 - Refill Call (Auto Added)   Tasks due within next 3 months   No tasks due.     Lisa Berkowitz, Patient Care Assistant  [facility]  [address] [facility]  [facility] [address]  Phone: [phone]  Fax: [phone]

## 2022-08-25 ENCOUNTER — LAB VISIT (OUTPATIENT)
Dept: LAB | Facility: HOSPITAL | Age: 82
End: 2022-08-25
Attending: STUDENT IN AN ORGANIZED HEALTH CARE EDUCATION/TRAINING PROGRAM
Payer: MEDICARE

## 2022-08-25 ENCOUNTER — OFFICE VISIT (OUTPATIENT)
Dept: GASTROENTEROLOGY | Facility: CLINIC | Age: 82
End: 2022-08-25
Payer: MEDICARE

## 2022-08-25 VITALS
SYSTOLIC BLOOD PRESSURE: 122 MMHG | DIASTOLIC BLOOD PRESSURE: 71 MMHG | HEIGHT: 64 IN | HEART RATE: 65 BPM | WEIGHT: 126 LBS | BODY MASS INDEX: 21.51 KG/M2

## 2022-08-25 DIAGNOSIS — R10.13 DYSPEPSIA: ICD-10-CM

## 2022-08-25 DIAGNOSIS — R14.0 BLOATING: Primary | ICD-10-CM

## 2022-08-25 PROCEDURE — 99999 PR PBB SHADOW E&M-EST. PATIENT-LVL IV: CPT | Mod: PBBFAC,,, | Performed by: STUDENT IN AN ORGANIZED HEALTH CARE EDUCATION/TRAINING PROGRAM

## 2022-08-25 PROCEDURE — 83516 IMMUNOASSAY NONANTIBODY: CPT | Performed by: STUDENT IN AN ORGANIZED HEALTH CARE EDUCATION/TRAINING PROGRAM

## 2022-08-25 PROCEDURE — 99214 OFFICE O/P EST MOD 30 MIN: CPT | Mod: PBBFAC | Performed by: STUDENT IN AN ORGANIZED HEALTH CARE EDUCATION/TRAINING PROGRAM

## 2022-08-25 PROCEDURE — 99204 OFFICE O/P NEW MOD 45 MIN: CPT | Mod: S$PBB,,, | Performed by: STUDENT IN AN ORGANIZED HEALTH CARE EDUCATION/TRAINING PROGRAM

## 2022-08-25 PROCEDURE — 99999 PR PBB SHADOW E&M-EST. PATIENT-LVL IV: ICD-10-PCS | Mod: PBBFAC,,, | Performed by: STUDENT IN AN ORGANIZED HEALTH CARE EDUCATION/TRAINING PROGRAM

## 2022-08-25 PROCEDURE — 99204 PR OFFICE/OUTPT VISIT, NEW, LEVL IV, 45-59 MIN: ICD-10-PCS | Mod: S$PBB,,, | Performed by: STUDENT IN AN ORGANIZED HEALTH CARE EDUCATION/TRAINING PROGRAM

## 2022-08-25 PROCEDURE — 86677 HELICOBACTER PYLORI ANTIBODY: CPT | Performed by: STUDENT IN AN ORGANIZED HEALTH CARE EDUCATION/TRAINING PROGRAM

## 2022-08-25 NOTE — PROGRESS NOTES
"    [facility] Consultation Note    Reason for Consult:  The primary encounter diagnosis was Bloating. A diagnosis of Dyspepsia was also pertinent to this visit.    PCP:   Derek Cruz   [address] / [place] [address]    Referring MD:  Kvng Nunez Md  [address] [place] [address]    HPI:  This is a 82 y.o. female here for evaluation of bloating.    She notes that two months ago she awakened with severe bloating.  The bloating she developed was diffuse throughout her entire body.  She states it was difficult to even walk or move around.    Reports a long history of bloating and gassiness.  Her stomach swells up as if she is pregnant.  This varies based on the quantity of food she eats.  With small amounts, she does well, though with a "normal" sized meal, her abdomen becomes hard as a rock.      She denies any clear heartburn or reflux symptoms.      Three years ago she had a similar instance of bloating.  She was seen at [facility] and reports a 3 day hospitalization without any clear etiology for her symptoms.      She walks 3-4 miles daily.    She wonders if it is related to tight clothing she previously wore.      Of note, she had been on pantoprazole previously, but does not think it helped at all.    No prior EGD.    ROS:  Constitutional: No fevers, chills, No weight loss  ENT: No allergies  CV: No chest pain  Pulm: No cough, No shortness of breath  Ophtho: No vision changes  GI: see HPI  Derm: No rash  Heme: No lymphadenopathy, No bruising  MSK: No arthritis  : No dysuria, No hematuria  Endo: No hot or cold intolerance  Neuro: No syncope, No seizure  Psych: No anxiety, No depression    Medical History:  has a past medical history of Cataract, Choroidal nevus, Eczema, Encounter for blood transfusion, Epilepsy, H/O right wrist surgery (2009), Nuclear sclerosis, bilateral (8/19/2019), Nuclear sclerotic cataract of left eye (9/30/2019), Transfusion reaction, and " Urticaria.    Surgical History:  has a past surgical history that includes Wrist surgery (Right); Nose surgery; Cataract extraction w/  intraocular lens implant (Right, 08/19/2019); Cataract extraction w/  intraocular lens implant (Right, 08/19/2019); and Cataract extraction w/  intraocular lens implant (Left, 09/30/2019).    Family History: family history includes Asthma in her mother; Cancer in her brother..     Social History:  reports that she has never smoked. She has never used smokeless tobacco. She reports current alcohol use of about 1.0 standard drink of alcohol per week. She reports that she does not use drugs.    Review of patient's allergies indicates:   Allergen Reactions    House dust mite Hives, Itching, Rash and Swelling       Current Outpatient Rx   Medication Sig Dispense Refill    dupilumab (DUPIXENT SYRINGE) 300 mg/2 mL Syrg Inject 2 mLs (1 syringe) into the skin every 14 (fourteen) days. 4 mL 11    levetiracetam (KEPPRA XR) 500 mg Tb24 Take 500 mg by mouth every evening. Pt takes 5 tablets nightly for a total of 2500mg      pantoprazole (PROTONIX) 40 MG tablet Take 1 tablet (40 mg total) by mouth once daily. 30 tablet 11    levocetirizine (XYZAL) 5 MG tablet TAKE 2 TABLETS(10 MG) BY MOUTH TWICE DAILY FOR CHRONIC URTICARIA (Patient not taking: Reported on 8/25/2022) 120 tablet 6    mometasone (NASONEX) 50 mcg/actuation nasal spray 2 sprays by Nasal route once daily. (Patient not taking: Reported on 8/25/2022) 17 g 11    neomycin-polymyxin-dexamethasone (DEXACINE) 3.5 mg/g-10,000 unit/g-0.1 % Oint Place into both eyes 2 (two) times a day. (Patient not taking: Reported on 8/25/2022) 3.5 g 6    olopatadine (PATANOL) 0.1 % ophthalmic solution INSTILL 1 DROP IN BOTH EYES TWICE DAILY (Patient not taking: Reported on 8/25/2022) 5 mL 1    olopatadine (PATANOL) 0.1 % ophthalmic solution Place 1 drop into both eyes 2 (two) times daily. As needed 5 mL 6    tacrolimus (PROTOPIC) 0.1 % ointment  "Apply topically 2 (two) times daily. To affected areas as needed (Patient not taking: Reported on 8/25/2022) 60 g 3    triamcinolone acetonide 0.1% (KENALOG) 0.1 % cream Apply topically.         Objective Findings:    Vital Signs:  /71   Pulse 65   Ht 5' 4" (1.626 m)   Wt 57.2 kg (126 lb)   BMI 21.63 kg/m²   Body mass index is 21.63 kg/m².    Physical Exam:  General Appearance: Well appearing in no acute distress  Head:   Normocephalic, without obvious abnormality  Eyes:    No scleral icterus, EOMI  ENT: Neck supple, Lips, mucosa, and tongue normal; teeth and gums normal  Lungs: CTA bilaterally in anterior and posterior fields, no wheezes, no crackles.  Heart:  Regular rate and rhythm, S1, S2 normal, no murmurs heard  Abdomen: Soft, non tender, non distended with positive bowel sounds in all four quadrants. No hepatosplenomegaly, ascites, or mass  Extremities: 2+ pulses, no clubbing, cyanosis or edema  Skin: No rash  Neurologic: CN II-XII intact      Labs:  Lab Results   Component Value Date    WBC 4.67 03/18/2022    HGB 12.7 03/18/2022    HCT 40.1 03/18/2022     03/18/2022    ALT 14 05/26/2022    AST 19 05/26/2022     05/26/2022    K 4.8 05/26/2022     05/26/2022    CREATININE 0.8 05/26/2022    BUN 19 05/26/2022    CO2 28 05/26/2022    TSH 2.789 03/18/2022    HGBA1C 5.5 10/21/2020           Assessment:  1. Bloating    2. Dyspepsia      In brief, the patient is a 82-year-old female who presents to GI clinic in the setting of bloating.    The patient is doing well at the moment without any notable ongoing symptoms.  She reports intermittent bloating that occurs in the postprandial setting with a few severe episodes.  I am not entirely sure what the etiology of her symptoms are, but I have considered H pylori gastritis, celiac disease, and small intestinal bacterial overgrowth.  I will check blood work today to rule out H pylori and celiac disease.  I will also schedule her for a breath " test to assess for SIBO.    Additionally, given that she is 82 with weight loss and food avoidance I have recommended we schedule an EGD.  I would like to assess for luminal and/or mucosal abnormalities.    She was given a low FODMAP handout for guidance to potentially decrease bloating with dietary modification.    Follow up in about 3 months (around 11/25/2022).      Order summary:  Orders Placed This Encounter    Celiac Disease Panel    H. PYLORI ANTIBODY, IGG    Ambulatory referral/consult to Endo Procedure          Thank you so much for allowing me to participate in the care of Gabriela Kirkland MD

## 2022-08-25 NOTE — PATIENT INSTRUCTIONS
Source: [email or web address]

## 2022-08-26 LAB — H PYLORI IGG SERPL QL IA: NEGATIVE

## 2022-08-29 ENCOUNTER — PATIENT MESSAGE (OUTPATIENT)
Dept: GASTROENTEROLOGY | Facility: CLINIC | Age: 82
End: 2022-08-29
Payer: MEDICARE

## 2022-08-29 DIAGNOSIS — R10.9 ABDOMINAL PAIN, UNSPECIFIED ABDOMINAL LOCATION: Primary | ICD-10-CM

## 2022-08-29 LAB
GLIADIN PEPTIDE IGA SER-ACNC: 5 UNITS
GLIADIN PEPTIDE IGG SER-ACNC: 2 UNITS
IGA SERPL-MCNC: 231 MG/DL (ref 70–400)
TTG IGA SER-ACNC: 13 UNITS
TTG IGG SER-ACNC: 8 UNITS

## 2022-08-30 ENCOUNTER — LAB VISIT (OUTPATIENT)
Dept: LAB | Facility: HOSPITAL | Age: 82
End: 2022-08-30
Attending: PSYCHIATRY & NEUROLOGY
Payer: MEDICARE

## 2022-08-30 ENCOUNTER — TELEPHONE (OUTPATIENT)
Dept: INTERNAL MEDICINE | Facility: CLINIC | Age: 82
End: 2022-08-30
Payer: MEDICARE

## 2022-08-30 ENCOUNTER — OFFICE VISIT (OUTPATIENT)
Dept: NEUROLOGY | Facility: CLINIC | Age: 82
End: 2022-08-30
Payer: MEDICARE

## 2022-08-30 VITALS
WEIGHT: 126 LBS | DIASTOLIC BLOOD PRESSURE: 65 MMHG | SYSTOLIC BLOOD PRESSURE: 112 MMHG | HEART RATE: 57 BPM | BODY MASS INDEX: 21.51 KG/M2 | HEIGHT: 64 IN

## 2022-08-30 DIAGNOSIS — G40.309 NONINTRACTABLE GENERALIZED IDIOPATHIC EPILEPSY WITHOUT STATUS EPILEPTICUS: Primary | ICD-10-CM

## 2022-08-30 DIAGNOSIS — G40.309 NONINTRACTABLE GENERALIZED IDIOPATHIC EPILEPSY WITHOUT STATUS EPILEPTICUS: ICD-10-CM

## 2022-08-30 PROCEDURE — 99204 OFFICE O/P NEW MOD 45 MIN: CPT | Mod: S$PBB,,, | Performed by: PSYCHIATRY & NEUROLOGY

## 2022-08-30 PROCEDURE — 99204 PR OFFICE/OUTPT VISIT, NEW, LEVL IV, 45-59 MIN: ICD-10-PCS | Mod: S$PBB,,, | Performed by: PSYCHIATRY & NEUROLOGY

## 2022-08-30 PROCEDURE — 99213 OFFICE O/P EST LOW 20 MIN: CPT | Mod: PBBFAC | Performed by: PSYCHIATRY & NEUROLOGY

## 2022-08-30 PROCEDURE — 80177 DRUG SCRN QUAN LEVETIRACETAM: CPT | Performed by: PSYCHIATRY & NEUROLOGY

## 2022-08-30 PROCEDURE — 99999 PR PBB SHADOW E&M-EST. PATIENT-LVL III: ICD-10-PCS | Mod: PBBFAC,,, | Performed by: PSYCHIATRY & NEUROLOGY

## 2022-08-30 PROCEDURE — 99999 PR PBB SHADOW E&M-EST. PATIENT-LVL III: CPT | Mod: PBBFAC,,, | Performed by: PSYCHIATRY & NEUROLOGY

## 2022-08-30 PROCEDURE — 36415 COLL VENOUS BLD VENIPUNCTURE: CPT | Performed by: PSYCHIATRY & NEUROLOGY

## 2022-08-30 RX ORDER — LEVETIRACETAM 500 MG/1
2500 TABLET, FILM COATED, EXTENDED RELEASE ORAL DAILY
Qty: 480 TABLET | Refills: 3 | Status: SHIPPED | OUTPATIENT
Start: 2022-08-30 | End: 2023-05-02 | Stop reason: SDUPTHER

## 2022-08-30 NOTE — PATIENT INSTRUCTIONS
You came to Epilepsy Clinic because of your seizure disorder.  Your seizures are well controlled on Keppra (brand name only) XR 2500mg nightly.  Please continue this medication at this dose.     Do not miss any doses of medication. If a dose of medication is missed, take it as soon as it is remembered even if that means doubling up on the dose. Please get a lab test to check out the blood level of medication. Get regular sleep. Go to sleep at the same time and wake up at the same time every day. People with epilepsy require more sleep than people without epilepsy.  Sleeping 10-12 hours a day can be normal for a person with epilepsy.  Every seizure makes it harder to prevent the next seizure. Epilepsy is associated with SUDEP, or sudden unexpected death in epilepsy.  The risk is significantly higher if convulsive seizures are not well controlled. For more information, check out these websites: https://www.epilepsy.com/, https://www.epilepsyallianceamerica.org/, www.[place]-epilepsy.org, www.womenandepilepsy.org.      Per [place] law, no episodes of loss of consciousness for 6 months before driving.  Avoid dangerous situations.  For example, no baths/pools alone, no heights, no power tools.  Wear a bike helmet.  If breakthrough seizures occur that are different in character, frequency, or duration from normal episodes, please patient portal me or call the office and we will decide the next steps. If multiple seizures occur in a row without return back to baseline, 911 needs to be called.     Return to clinic in 12 months or sooner with issues.  Please patient portal with any questions or concerns.    Rach Cintron MD PhD  Neurology-Epilepsy  [facility].

## 2022-08-30 NOTE — PROGRESS NOTES
Name: Gabriela Edwards  MRN:[id number]   CSN: [id number]  Date of service: 2022  Age:82 y.o.   Gender:female   Referring Physician/Service: Aaareferral Self  No address on file   The patient is here today with:  self    Neurology Clinic: Initial Visit    CHIEF COMPLAINT:  Epilepsy    HPI 2022:     Age of first seizure: 2002, 65yo   Handedness: right   Seizure Risk Factors: maternal uncle with seizures (details unclear), no head strike with LOC, no CNS infections,  term with no prolonged hospitalization   Time of Last Seizure:    # of lifetime Seizures: 1 GTC, 8 other behavior arrest seizures   Frequency of Seizures: at most 1 in a day, every couple of years   Seizure Triggers: no idea   Injuries/Hospitalization for seizures? No injury, ED -> admitted after the GTC ()   Driving? Yes   Bone Health: Osteoporosis, previously on treatment with Fosamax  Mood: excellent      Auras: no warning, out of the blue     Seizure Events:   1. Generalized convulsions x1, foaming at the mouth, no urinary incotinence, no tongue bite, postictal confusion for about an hour   2. Staring forward, loss of awareness, 10s    Current AED/SEs:  1. Keppra (brand name only) extended release 500 mg tablets -> 2500ER nightly SE no issues     Previous AED/SEs or reason for DC.   Generic Keppra -> had another staring episode, needs brand name only medication     EEG: yes in the past, with abnormalities, but no reports or tracing avaialbe for review   MRI: for hearing loss, IAC after a mass was removed from her ear, no abnormalities     Other Allergies:  Dust mites     AED compliance, adherence: no missed doses     ROS 2022:  Previously followed by Dr. Patel for epilepsy.  Allergies, under great control with dupixent.  Occasion bloating. Joint pain. Otherwise, denies headache, loss of vision, blurred vision, diplopia, dysarthria, dysphagia, lightheadedness, vertigo, tinnitus or hearing difficulty. Denies difficulties producing  "or comprehending speech.  Denies focal weakness, numbness, paresthesias. Denies difficulty with gait. Denies cough, shortness of breath.  Denies chest pain or tightness, palpitations.  Denies nausea, vomiting, diarrhea, constipation or abdominal pain.  No falls.       EXAM:   - Vitals: /65   Pulse (!) 57   Ht 5' 4" (1.626 m)   Wt 57.2 kg (126 lb)   BMI 21.63 kg/m²    - General: Awake, cooperative, NAD.  - HEENT: NC/AT  - Neck:  Decreased range of motion  - Pulmonary: no increased WOB  - Cardiac: well perfused   - Abdomen: soft, nontender, nondistended  - Extremities: no edema  - Skin: no rashes or lesions noted.     NEURO EXAM:   - Mental Status: Awake, alert, oriented x 3. Able to relate history without difficulty. Attentive to examiner. Language is fluent with intact repetition and comprehension. Normal prosody. There were no paraphasic errors. Able to name both high and low frequency objects. Speech was not dysarthric. Able to follow both midline and appendicular commands. There was no evidence of apraxia or neglect.    - Cranial Nerves:  VFF. EOMI. No facial droop. Hearing intact to finger-rub bilaterally. 5/5 strength in trapezii and SCM bilaterally. Tongue protrudes in midline and to either side with no evidence of atrophy or weakness.    - Motor: Normal bulk and tone throughout. No pronator drift bilaterally. No adventitious movements such as tremor or asterixis noted.  Exam pain limited in right shoulder/arm    Delt Bic Tri WrE WrF  FFl FE IO IP Quad Ham TA Gastroc  R   5     5    5    5    5        5   4    5   5    5        5     5      5            L   5      5    5   5    5        5    5   5    5    5       5     5      5              - Sensory: No deficits to light touch. No extinction to DSS.  - Coordination: No dysmetria on FNF   - Gait: Good initiation.  Mildly wide-based, normal stride and arm swing. Romberg negative.    PLAN:  82-year-old woman with cryptogenic seizures well controlled " with Keppra (brand name only) 2500 mg extended release at night.  Refills.  Level. Follow up in about 1 year (around 8/30/2023).     Patient Instructions   You came to Epilepsy Clinic because of your seizure disorder.  Your seizures are well controlled on Keppra (brand name only) XR 2500mg nightly.  Please continue this medication at this dose.     Do not miss any doses of medication. If a dose of medication is missed, take it as soon as it is remembered even if that means doubling up on the dose. Please get a lab test to check out the blood level of medication. Get regular sleep. Go to sleep at the same time and wake up at the same time every day. People with epilepsy require more sleep than people without epilepsy.  Sleeping 10-12 hours a day can be normal for a person with epilepsy.  Every seizure makes it harder to prevent the next seizure. Epilepsy is associated with SUDEP, or sudden unexpected death in epilepsy.  The risk is significantly higher if convulsive seizures are not well controlled. For more information, check out these websites: https://www.epilepsy.com/, https://www.epilepsyallianceamerica.org/, www.[place]-epilepsy.org, www.womenandepilepsy.org.      Per [place] law, no episodes of loss of consciousness for 6 months before driving.  Avoid dangerous situations.  For example, no baths/pools alone, no heights, no power tools.  Wear a bike helmet.  If breakthrough seizures occur that are different in character, frequency, or duration from normal episodes, please patient portal me or call the office and we will decide the next steps. If multiple seizures occur in a row without return back to baseline, 911 needs to be called.     Return to clinic in 12 months or sooner with issues.  Please patient portal with any questions or concerns.    Rach Cintron MD PhD  Neurology-Epilepsy  [facility].      Problem List Items Addressed This Visit       Nonintractable generalized idiopathic  epilepsy without status epilepticus - Primary    Overview     Dx: 2000  Followed by Dr. Saravanan Perez  Last seizure: 2020         Relevant Medications    KEPPRA  mg Tb24 24 hr tablet    Other Relevant Orders    Levetiracetam Level       More than 50% of the 35 minutes spent with the patient (as well as family/caregiver(s) was spent on face-to-face counseling. Total time spent on encounter:  51 minutes     Disclaimer: This note has been generated using voice-recognition software. There may be typographical errors that were missed during proof-reading.     LABS:  Recent Labs   Lab 08/06/20  1255 10/21/20  0927 06/14/21  1047 11/10/21  1042 03/18/22  1006 05/26/22  1517   WBC  --   --  4.88  --  4.67  --    Hemoglobin  --   --  12.3  --  12.7  --    Hematocrit  --   --  38.0  --  40.1  --    Platelets  --   --  198  --  216  --    Sodium   < >  --  137  --  141 139   Potassium   < >  --  4.5  --  4.8 4.8   BUN   < >  --  11  --  12 19   Creatinine  --   --  0.8   < > 0.7 0.8   eGFR if   --   --  >60.0   < > >60.0 >60.0   eGFR if non   --   --  >60.0   < > >60.0 >60.0   Hemoglobin A1c  --  5.5  --   --   --   --    TSH  --   --   --   --  2.789  --     < > = values in this interval not displayed.              IMAGING:  Recent imaging is personally reviewed with the patient.    Results for orders placed during the hospital encounter of 11/10/21    MRI IAC/Temporal Bones W W/O Contrast    Impression  1. Overall unremarkable MRI performed with attention towards the internal auditory canals/temporal bones.  No definite finding identified to account for the patient's reported right-sided sensorineural hearing loss.  2. Additional details, as per the body of report.      Electronically signed by: Km Root  Date:    11/11/2021  Time:    08:59    Results for orders placed during the hospital encounter of 07/12/21    DXA Bone Density Spine And Hip    Impression  *Osteoporosis on  treatment with Fosamax    RECOMMENDATIONS:  *Daily calcium intake 0075-5419 mg, dietary sources preferred; Vitamin D 9497-6655 IU daily.  *Weight bearing exercise and fall precautions.  *If the patient has been treated with Fosamax for a period of at least 5 years and has not fractured, a drug holiday can be considered.  Continuing treatment is also reasonable if the patient is deemed to be at high risk for fracture.  *Repeat BMD in 2 years    EXPLANATION OF RESULTS:  T-score compares these results to the average bone density of a 20-29 year-old of the same gender.    Z-score compares this result to the average bone density to people of the same age, gender, and race.    The amounts indicate the number of standard deviations above or below the mean.    * Osteoporosis is generally defined as having a T-score between less than or equal to -2.5.    * Low bone mass (osteopenia) is generally defined as having a T-score between -1.0 and -2.5.    * The normal range is generally defined as having a T-score greater than or equal to -1.0.    * Calculated FRAX scores for fracture risk prediction may not be accurate in the setting of certain clinical factors such as pharmacologic therapy for osteoporosis, prior fragility fractures, high dose glucocorticoid use.      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    Urticaria, idiopathic 06/07/2017    Allergic rhinitis 06/07/2017    Eczema 06/07/2017    Nonintractable generalized idiopathic epilepsy without status epilepticus 06/14/2021    Age-related osteoporosis without current pathological fracture 06/14/2021     Resolved Ambulatory Problems     Diagnosis Date Noted    Allergic reaction 04/17/2017    Nuclear sclerosis, bilateral 08/19/2019    Post-operative state 09/30/2019    Nuclear sclerotic cataract of  left eye 09/30/2019     Past Medical History:   Diagnosis Date    Cataract     Choroidal nevus     Encounter for blood transfusion     Epilepsy     H/O right wrist surgery 2009    Transfusion reaction     Urticaria         PAST SURGICAL HISTORY:   Past Surgical History:   Procedure Laterality Date    CATARACT EXTRACTION W/  INTRAOCULAR LENS IMPLANT Right 08/19/2019    Dr. Freeman    CATARACT EXTRACTION W/  INTRAOCULAR LENS IMPLANT Right 08/19/2019    Procedure: EXTRACTION, CATARACT, WITH IOL INSERTION;  Surgeon: Maryuri Freeman MD;  Location: [facility] OR;  Service: Ophthalmology;  Laterality: Right;  Laser    CATARACT EXTRACTION W/  INTRAOCULAR LENS IMPLANT Left 09/30/2019    Procedure: EXTRACTION, CATARACT, WITH IOL INSERTION;  Surgeon: Maryuri Freeman MD;  Location: [facility];  Service: Ophthalmology;  Laterality: Left;  LASER ASSISTED    NOSE SURGERY      WRIST SURGERY Right     2009        ALLERGIES: House dust mite   CURRENT MEDICATIONS:   Current Outpatient Medications   Medication Sig Dispense Refill    dupilumab (DUPIXENT SYRINGE) 300 mg/2 mL Syrg Inject 2 mLs (1 syringe) into the skin every 14 (fourteen) days. 4 mL 11    KEPPRA  mg Tb24 24 hr tablet Take 5 tablets (2,500 mg total) by mouth once daily. 450 tablet 3    levocetirizine (XYZAL) 5 MG tablet TAKE 2 TABLETS(10 MG) BY MOUTH TWICE DAILY FOR CHRONIC URTICARIA (Patient not taking: Reported on 8/25/2022) 120 tablet 6    mometasone (NASONEX) 50 mcg/actuation nasal spray 2 sprays by Nasal route once daily. (Patient not taking: Reported on 8/25/2022) 17 g 11    neomycin-polymyxin-dexamethasone (DEXACINE) 3.5 mg/g-10,000 unit/g-0.1 % Oint Place into both eyes 2 (two) times a day. (Patient not taking: Reported on 8/25/2022) 3.5 g 6    olopatadine (PATANOL) 0.1 % ophthalmic solution INSTILL 1 DROP IN BOTH EYES TWICE DAILY (Patient not taking: Reported on 8/25/2022) 5 mL 1    tacrolimus (PROTOPIC) 0.1 % ointment Apply topically 2 (two) times daily. To affected  areas as needed (Patient not taking: Reported on 8/25/2022) 60 g 3    triamcinolone acetonide 0.1% (KENALOG) 0.1 % cream Apply topically.       No current facility-administered medications for this visit.        FAMILY HISTORY:   Family History   Problem Relation Age of Onset    Cancer Brother     Asthma Mother     Amblyopia Neg Hx     Blindness Neg Hx     Cataracts Neg Hx     Glaucoma Neg Hx     Macular degeneration Neg Hx     Retinal detachment Neg Hx     Strabismus Neg Hx     Melanoma Neg Hx     Psoriasis Neg Hx     Lupus Neg Hx     Colon cancer Neg Hx     Esophageal cancer Neg Hx          SOCIAL HISTORY:   Social History     Socioeconomic History    Marital status:    Occupational History    Occupation: Retired      Comment: retired 2016   Tobacco Use    Smoking status: Never    Smokeless tobacco: Never   Substance and Sexual Activity    Alcohol use: Yes     Alcohol/week: 1.0 standard drink     Types: 1 Glasses of wine per week     Comment: 1 glass of wine a day    Drug use: No    Sexual activity: Not Currently     Birth control/protection: None         Questions and concerns raised by the patient and family/care-giver(s) were addressed and they indicated understanding of everything discussed and agreed to plans as above.    Rach Cintron MD PhD  Neurology-Epilepsy  [facility].

## 2022-08-30 NOTE — TELEPHONE ENCOUNTER
----- Message from Palmer Chaka sent at 8/30/2022  6:33 AM CDT -----  Contact: Patient  The following request was sent through the pt portal    Appointment Request From: Gabriela Edwards    With Provider: Derek Cruz MD [[facility]]    Preferred Date Range: 8/30/2022 - 9/30/2022    Preferred Times: Any Time    Reason for visit: Annual Checkup    Comments:  This is my annual checkup with my primary care doctor.    The pt can be reached at [phone]

## 2022-08-31 ENCOUNTER — PATIENT MESSAGE (OUTPATIENT)
Dept: NEUROLOGY | Facility: CLINIC | Age: 82
End: 2022-08-31
Payer: MEDICARE

## 2022-09-02 LAB — LEVETIRACETAM SERPL-MCNC: 37.7 UG/ML (ref 3–60)

## 2022-09-07 ENCOUNTER — SPECIALTY PHARMACY (OUTPATIENT)
Dept: PHARMACY | Facility: CLINIC | Age: 82
End: 2022-09-07
Payer: MEDICARE

## 2022-09-07 NOTE — TELEPHONE ENCOUNTER
Specialty Pharmacy - Refill Coordination    Specialty Medication Orders Linked to Encounter      Flowsheet Row Most Recent Value   Medication #1 dupilumab (DUPIXENT SYRINGE) 300 mg/2 mL Syrg (Order#[id number], Rx#[id number])            Refill Questions - Documented Responses      Flowsheet Row Most Recent Value   Patient Availability and HIPAA Verification    Does patient want to proceed with activity? Yes   HIPAA/medical authority confirmed? Yes   Relationship to patient of person spoken to? Self   Refill Screening Questions    Changes to allergies? No   Changes to medications? No   New conditions since last clinic visit? No   Unplanned office visit, urgent care, ED, or hospital admission in the last 4 weeks? No   How does patient/caregiver feel medication is working? Excellent   Financial problems or insurance changes? No   How many doses of your specialty medications were missed in the last 4 weeks? 0   Would patient like to speak to a pharmacist? No   When does the patient need to receive the medication? 09/14/22   Refill Delivery Questions    How will the patient receive the medication? Delivery Anne-Marie   When does the patient need to receive the medication? 09/14/22   Shipping Address Home   Address in [place] confirmed and updated if neccessary? Yes   Expected Copay ($) 35.76   Is the patient able to afford the medication copay? Yes   Payment Method CC on file   Days supply of Refill 28   Supplies needed? No supplies needed   Refill activity completed? Yes   Refill activity plan Refill scheduled   Shipment/Pickup Date: 09/12/22            Current Outpatient Medications   Medication Sig    dupilumab (DUPIXENT SYRINGE) 300 mg/2 mL Syrg Inject 2 mLs (1 syringe) into the skin every 14 (fourteen) days.    KEPPRA  mg Tb24 24 hr tablet Take 5 tablets (2,500 mg total) by mouth once daily.    levocetirizine (XYZAL) 5 MG tablet TAKE 2 TABLETS(10 MG) BY MOUTH TWICE DAILY FOR CHRONIC URTICARIA (Patient not  taking: Reported on 8/25/2022)    mometasone (NASONEX) 50 mcg/actuation nasal spray 2 sprays by Nasal route once daily. (Patient not taking: Reported on 8/25/2022)    neomycin-polymyxin-dexamethasone (DEXACINE) 3.5 mg/g-10,000 unit/g-0.1 % Oint Place into both eyes 2 (two) times a day. (Patient not taking: Reported on 8/25/2022)    olopatadine (PATANOL) 0.1 % ophthalmic solution INSTILL 1 DROP IN BOTH EYES TWICE DAILY (Patient not taking: Reported on 8/25/2022)    tacrolimus (PROTOPIC) 0.1 % ointment Apply topically 2 (two) times daily. To affected areas as needed (Patient not taking: Reported on 8/25/2022)    triamcinolone acetonide 0.1% (KENALOG) 0.1 % cream Apply topically.   Last reviewed on 8/30/2022  1:53 PM by Rach Cintron MD PhD    Review of patient's allergies indicates:   Allergen Reactions    House dust mite Hives, Itching, Rash and Swelling    Last reviewed on  8/30/2022 1:53 PM by Rach Cintron      Tasks added this encounter   10/5/2022 - Refill Call (Auto Added)   Tasks due within next 3 months   No tasks due.     Iona Goodwin, PharmD  [facility]  [address] [facility] [address]  Phone: [phone]  Fax: [phone]

## 2022-09-16 ENCOUNTER — PATIENT MESSAGE (OUTPATIENT)
Dept: GASTROENTEROLOGY | Facility: CLINIC | Age: 82
End: 2022-09-16
Payer: MEDICARE

## 2022-09-20 ENCOUNTER — PATIENT MESSAGE (OUTPATIENT)
Dept: OPTOMETRY | Facility: CLINIC | Age: 82
End: 2022-09-20
Payer: MEDICARE

## 2022-09-21 ENCOUNTER — HOSPITAL ENCOUNTER (OUTPATIENT)
Dept: RADIOLOGY | Facility: HOSPITAL | Age: 82
Discharge: HOME | End: 2022-09-21
Attending: STUDENT IN AN ORGANIZED HEALTH CARE EDUCATION/TRAINING PROGRAM
Payer: MEDICARE

## 2022-09-21 DIAGNOSIS — R10.9 ABDOMINAL PAIN, UNSPECIFIED ABDOMINAL LOCATION: ICD-10-CM

## 2022-09-21 PROCEDURE — 74177 CT ABD & PELVIS W/CONTRAST: CPT | Mod: TC

## 2022-09-21 PROCEDURE — 74177 CT ABD & PELVIS W/CONTRAST: CPT | Mod: 26,,, | Performed by: INTERNAL MEDICINE

## 2022-09-21 PROCEDURE — 25500020 PHARM REV CODE 255: Performed by: STUDENT IN AN ORGANIZED HEALTH CARE EDUCATION/TRAINING PROGRAM

## 2022-09-21 PROCEDURE — 74177 CT ABDOMEN PELVIS WITH CONTRAST: ICD-10-PCS | Mod: 26,,, | Performed by: INTERNAL MEDICINE

## 2022-09-21 RX ADMIN — IOHEXOL 75 ML: 350 INJECTION, SOLUTION INTRAVENOUS at 03:09

## 2022-09-22 LAB
CREAT SERPL-MCNC: 0.6 MG/DL (ref 0.5–1.4)
SAMPLE: NORMAL

## 2022-10-05 ENCOUNTER — SPECIALTY PHARMACY (OUTPATIENT)
Dept: PHARMACY | Facility: CLINIC | Age: 82
End: 2022-10-05
Payer: MEDICARE

## 2022-10-05 NOTE — TELEPHONE ENCOUNTER
Specialty Pharmacy - Refill Coordination    Specialty Medication Orders Linked to Encounter      Flowsheet Row Most Recent Value   Medication #1 dupilumab (DUPIXENT SYRINGE) 300 mg/2 mL Syrg (Order#[id number], Rx#[id number])            Refill Questions - Documented Responses      Flowsheet Row Most Recent Value   Patient Availability and HIPAA Verification    Does patient want to proceed with activity? Yes   HIPAA/medical authority confirmed? Yes   Relationship to patient of person spoken to? Self   Refill Screening Questions    Changes to allergies? No   Changes to medications? No   New conditions since last clinic visit? No   Unplanned office visit, urgent care, ED, or hospital admission in the last 4 weeks? No   How does patient/caregiver feel medication is working? Excellent   Financial problems or insurance changes? No   How many doses of your specialty medications were missed in the last 4 weeks? 0   Would patient like to speak to a pharmacist? No   When does the patient need to receive the medication? 10/12/22   Refill Delivery Questions    How will the patient receive the medication? MEDRx   When does the patient need to receive the medication? 10/12/22   Shipping Address Home   Address in [facility] confirmed and updated if neccessary? Yes   Expected Copay ($) 40   Is the patient able to afford the medication copay? Yes   Payment Method CC on file   Days supply of Refill 28   Supplies needed? No supplies needed   Refill activity completed? Yes   Refill activity plan Refill scheduled   Shipment/Pickup Date: 10/06/22            Current Outpatient Medications   Medication Sig    dupilumab (DUPIXENT SYRINGE) 300 mg/2 mL Syrg Inject 2 mLs (1 syringe) into the skin every 14 (fourteen) days.    KEPPRA  mg Tb24 24 hr tablet Take 5 tablets (2,500 mg total) by mouth once daily.    levocetirizine (XYZAL) 5 MG tablet TAKE 2 TABLETS(10 MG) BY MOUTH TWICE DAILY FOR CHRONIC URTICARIA (Patient not taking:  Reported on 8/25/2022)    mometasone (NASONEX) 50 mcg/actuation nasal spray 2 sprays by Nasal route once daily. (Patient not taking: Reported on 8/25/2022)    neomycin-polymyxin-dexamethasone (DEXACINE) 3.5 mg/g-10,000 unit/g-0.1 % Oint Place into both eyes 2 (two) times a day. (Patient not taking: Reported on 8/25/2022)    olopatadine (PATANOL) 0.1 % ophthalmic solution INSTILL 1 DROP IN BOTH EYES TWICE DAILY (Patient not taking: Reported on 8/25/2022)    tacrolimus (PROTOPIC) 0.1 % ointment Apply topically 2 (two) times daily. To affected areas as needed (Patient not taking: Reported on 8/25/2022)    triamcinolone acetonide 0.1% (KENALOG) 0.1 % cream Apply topically.   Last reviewed on 8/30/2022  1:53 PM by Rach Cintron MD PhD    Review of patient's allergies indicates:   Allergen Reactions    House dust mite Hives, Itching, Rash and Swelling    Last reviewed on  8/30/2022 1:53 PM by Rach Cintron      Tasks added this encounter   11/2/2022 - Refill Call (Auto Added)   Tasks due within next 3 months   No tasks due.     Behzad Duong, PharmD  [facility]  [address] [facility] [address]  Phone: [phone]  Fax: [phone]

## 2022-10-13 ENCOUNTER — PATIENT MESSAGE (OUTPATIENT)
Dept: GASTROENTEROLOGY | Facility: CLINIC | Age: 82
End: 2022-10-13
Payer: MEDICARE

## 2022-10-14 ENCOUNTER — LAB VISIT (OUTPATIENT)
Dept: LAB | Facility: HOSPITAL | Age: 82
End: 2022-10-14
Attending: INTERNAL MEDICINE
Payer: MEDICARE

## 2022-10-14 ENCOUNTER — PATIENT MESSAGE (OUTPATIENT)
Dept: OPTOMETRY | Facility: CLINIC | Age: 82
End: 2022-10-14
Payer: MEDICARE

## 2022-10-14 ENCOUNTER — OFFICE VISIT (OUTPATIENT)
Dept: INTERNAL MEDICINE | Facility: CLINIC | Age: 82
End: 2022-10-14
Payer: MEDICARE

## 2022-10-14 VITALS
HEIGHT: 64 IN | SYSTOLIC BLOOD PRESSURE: 136 MMHG | WEIGHT: 126.56 LBS | HEART RATE: 59 BPM | OXYGEN SATURATION: 97 % | DIASTOLIC BLOOD PRESSURE: 80 MMHG | BODY MASS INDEX: 21.61 KG/M2

## 2022-10-14 DIAGNOSIS — R79.9 ABNORMAL FINDING OF BLOOD CHEMISTRY, UNSPECIFIED: ICD-10-CM

## 2022-10-14 DIAGNOSIS — Z13.6 SCREENING FOR CARDIOVASCULAR CONDITION: ICD-10-CM

## 2022-10-14 DIAGNOSIS — L50.1 URTICARIA, IDIOPATHIC: Primary | ICD-10-CM

## 2022-10-14 DIAGNOSIS — G40.309 NONINTRACTABLE GENERALIZED IDIOPATHIC EPILEPSY WITHOUT STATUS EPILEPTICUS: ICD-10-CM

## 2022-10-14 DIAGNOSIS — L50.1 URTICARIA, IDIOPATHIC: ICD-10-CM

## 2022-10-14 LAB
ALBUMIN SERPL BCP-MCNC: 4.5 G/DL (ref 3.5–5.2)
ALP SERPL-CCNC: 54 U/L (ref 55–135)
ALT SERPL W/O P-5'-P-CCNC: 13 U/L (ref 10–44)
ANION GAP SERPL CALC-SCNC: 8 MMOL/L (ref 8–16)
AST SERPL-CCNC: 23 U/L (ref 10–40)
BASOPHILS # BLD AUTO: 0.05 K/UL (ref 0–0.2)
BASOPHILS NFR BLD: 0.6 % (ref 0–1.9)
BILIRUB SERPL-MCNC: 0.7 MG/DL (ref 0.1–1)
BUN SERPL-MCNC: 11 MG/DL (ref 8–23)
CALCIUM SERPL-MCNC: 10.3 MG/DL (ref 8.7–10.5)
CHLORIDE SERPL-SCNC: 104 MMOL/L (ref 95–110)
CHOLEST SERPL-MCNC: 239 MG/DL (ref 120–199)
CHOLEST/HDLC SERPL: 2.7 {RATIO} (ref 2–5)
CO2 SERPL-SCNC: 26 MMOL/L (ref 23–29)
CREAT SERPL-MCNC: 0.8 MG/DL (ref 0.5–1.4)
DIFFERENTIAL METHOD: ABNORMAL
EOSINOPHIL # BLD AUTO: 1 K/UL (ref 0–0.5)
EOSINOPHIL NFR BLD: 12.9 % (ref 0–8)
ERYTHROCYTE [DISTWIDTH] IN BLOOD BY AUTOMATED COUNT: 13.2 % (ref 11.5–14.5)
EST. GFR  (NO RACE VARIABLE): >60 ML/MIN/1.73 M^2
ESTIMATED AVG GLUCOSE: 111 MG/DL (ref 68–131)
GLUCOSE SERPL-MCNC: 96 MG/DL (ref 70–110)
HBA1C MFR BLD: 5.5 % (ref 4–5.6)
HCT VFR BLD AUTO: 41 % (ref 37–48.5)
HDLC SERPL-MCNC: 88 MG/DL (ref 40–75)
HDLC SERPL: 36.8 % (ref 20–50)
HGB BLD-MCNC: 13.3 G/DL (ref 12–16)
IMM GRANULOCYTES # BLD AUTO: 0.04 K/UL (ref 0–0.04)
IMM GRANULOCYTES NFR BLD AUTO: 0.5 % (ref 0–0.5)
LDLC SERPL CALC-MCNC: 133 MG/DL (ref 63–159)
LYMPHOCYTES # BLD AUTO: 2.4 K/UL (ref 1–4.8)
LYMPHOCYTES NFR BLD: 29.8 % (ref 18–48)
MCH RBC QN AUTO: 29.8 PG (ref 27–31)
MCHC RBC AUTO-ENTMCNC: 32.4 G/DL (ref 32–36)
MCV RBC AUTO: 92 FL (ref 82–98)
MONOCYTES # BLD AUTO: 0.7 K/UL (ref 0.3–1)
MONOCYTES NFR BLD: 8.5 % (ref 4–15)
NEUTROPHILS # BLD AUTO: 3.8 K/UL (ref 1.8–7.7)
NEUTROPHILS NFR BLD: 47.7 % (ref 38–73)
NONHDLC SERPL-MCNC: 151 MG/DL
NRBC BLD-RTO: 0 /100 WBC
PLATELET # BLD AUTO: 228 K/UL (ref 150–450)
PMV BLD AUTO: 10.4 FL (ref 9.2–12.9)
POTASSIUM SERPL-SCNC: 5.1 MMOL/L (ref 3.5–5.1)
PROT SERPL-MCNC: 8 G/DL (ref 6–8.4)
RBC # BLD AUTO: 4.47 M/UL (ref 4–5.4)
SODIUM SERPL-SCNC: 138 MMOL/L (ref 136–145)
TRIGL SERPL-MCNC: 90 MG/DL (ref 30–150)
WBC # BLD AUTO: 7.88 K/UL (ref 3.9–12.7)

## 2022-10-14 PROCEDURE — 83036 HEMOGLOBIN GLYCOSYLATED A1C: CPT | Performed by: INTERNAL MEDICINE

## 2022-10-14 PROCEDURE — 36415 COLL VENOUS BLD VENIPUNCTURE: CPT | Performed by: INTERNAL MEDICINE

## 2022-10-14 PROCEDURE — 99214 PR OFFICE/OUTPT VISIT, EST, LEVL IV, 30-39 MIN: ICD-10-PCS | Mod: S$PBB,,, | Performed by: INTERNAL MEDICINE

## 2022-10-14 PROCEDURE — 80061 LIPID PANEL: CPT | Performed by: INTERNAL MEDICINE

## 2022-10-14 PROCEDURE — 99213 OFFICE O/P EST LOW 20 MIN: CPT | Mod: PBBFAC | Performed by: INTERNAL MEDICINE

## 2022-10-14 PROCEDURE — 99999 PR PBB SHADOW E&M-EST. PATIENT-LVL III: CPT | Mod: PBBFAC,,, | Performed by: INTERNAL MEDICINE

## 2022-10-14 PROCEDURE — 99214 OFFICE O/P EST MOD 30 MIN: CPT | Mod: S$PBB,,, | Performed by: INTERNAL MEDICINE

## 2022-10-14 PROCEDURE — 80053 COMPREHEN METABOLIC PANEL: CPT | Performed by: INTERNAL MEDICINE

## 2022-10-14 PROCEDURE — 85025 COMPLETE CBC W/AUTO DIFF WBC: CPT | Performed by: INTERNAL MEDICINE

## 2022-10-14 PROCEDURE — 99999 PR PBB SHADOW E&M-EST. PATIENT-LVL III: ICD-10-PCS | Mod: PBBFAC,,, | Performed by: INTERNAL MEDICINE

## 2022-10-17 DIAGNOSIS — L23.9 ALLERGIC CONTACT DERMATITIS, UNSPECIFIED TRIGGER: Primary | ICD-10-CM

## 2022-10-17 RX ORDER — NEOMYCIN SULFATE, POLYMYXIN B SULFATE, AND DEXAMETHASONE 3.5; 10000; 1 MG/G; [USP'U]/G; MG/G
OINTMENT OPHTHALMIC 2 TIMES DAILY
Qty: 3.5 G | Refills: 3 | Status: SHIPPED | OUTPATIENT
Start: 2022-10-17 | End: 2022-10-31

## 2022-10-18 ENCOUNTER — PATIENT MESSAGE (OUTPATIENT)
Dept: INTERNAL MEDICINE | Facility: CLINIC | Age: 82
End: 2022-10-18
Payer: MEDICARE

## 2022-10-23 ENCOUNTER — PATIENT MESSAGE (OUTPATIENT)
Dept: OPTOMETRY | Facility: CLINIC | Age: 82
End: 2022-10-23
Payer: MEDICARE

## 2022-11-02 ENCOUNTER — SPECIALTY PHARMACY (OUTPATIENT)
Dept: PHARMACY | Facility: CLINIC | Age: 82
End: 2022-11-02
Payer: MEDICARE

## 2022-11-02 NOTE — TELEPHONE ENCOUNTER
Specialty Pharmacy - Refill Coordination    Specialty Medication Orders Linked to Encounter      Flowsheet Row Most Recent Value   Medication #1 dupilumab (DUPIXENT SYRINGE) 300 mg/2 mL Syrg (Order#[id number], Rx#[id number])          Pt going out of town and requests shipment early. Understands how to travel with medication and store it.      Refill Questions - Documented Responses      Flowsheet Row Most Recent Value   Patient Availability and HIPAA Verification    Does patient want to proceed with activity? Yes   HIPAA/medical authority confirmed? Yes   Relationship to patient of person spoken to? Self   Refill Screening Questions    Changes to allergies? No   Changes to medications? No   New conditions since last clinic visit? No   Unplanned office visit, urgent care, ED, or hospital admission in the last 4 weeks? No   How does patient/caregiver feel medication is working? Good   Financial problems or insurance changes? No   How many doses of your specialty medications were missed in the last 4 weeks? 0   Would patient like to speak to a pharmacist? No   When does the patient need to receive the medication? 11/09/22   Refill Delivery Questions    How will the patient receive the medication? MEDRx   When does the patient need to receive the medication? 11/09/22   Shipping Address Home   Address in [facility] confirmed and updated if neccessary? Yes   Expected Copay ($) 40   Is the patient able to afford the medication copay? Yes   Payment Method zero copay   Days supply of Refill 28   Supplies needed? Alcohol Swabs   Refill activity completed? Yes   Refill activity plan Refill scheduled   Shipment/Pickup Date: 11/03/22            Current Outpatient Medications   Medication Sig    dupilumab (DUPIXENT SYRINGE) 300 mg/2 mL Syrg Inject 2 mLs (1 syringe) into the skin every 14 (fourteen) days.    KEPPRA  mg Tb24 24 hr tablet Take 5 tablets (2,500 mg total) by mouth once daily.    triamcinolone acetonide  0.1% (KENALOG) 0.1 % cream Apply topically.   Last reviewed on 10/14/2022 10:59 AM by Derek Crzu MD    Review of patient's allergies indicates:   Allergen Reactions    House dust mite Hives, Itching, Rash and Swelling    Last reviewed on  10/17/2022 8:15 AM by Maci Trotter      Tasks added this encounter   11/30/2022 - Refill Call (Auto Added)   Tasks due within next 3 months   No tasks due.     Radha Feliciano, PharmD  [facility]  [address] [place] [id number]  Phone: [phone]  Fax: [phone]

## 2022-11-05 ENCOUNTER — PATIENT MESSAGE (OUTPATIENT)
Dept: NEUROLOGY | Facility: CLINIC | Age: 82
End: 2022-11-05
Payer: MEDICARE

## 2022-11-16 ENCOUNTER — PATIENT MESSAGE (OUTPATIENT)
Dept: OPTOMETRY | Facility: CLINIC | Age: 82
End: 2022-11-16
Payer: MEDICARE

## 2022-11-16 ENCOUNTER — PATIENT MESSAGE (OUTPATIENT)
Dept: GASTROENTEROLOGY | Facility: CLINIC | Age: 82
End: 2022-11-16
Payer: MEDICARE

## 2022-11-17 ENCOUNTER — PATIENT MESSAGE (OUTPATIENT)
Dept: GASTROENTEROLOGY | Facility: CLINIC | Age: 82
End: 2022-11-17
Payer: MEDICARE

## 2022-11-18 ENCOUNTER — PATIENT MESSAGE (OUTPATIENT)
Dept: GASTROENTEROLOGY | Facility: CLINIC | Age: 82
End: 2022-11-18
Payer: MEDICARE

## 2022-11-21 ENCOUNTER — PATIENT MESSAGE (OUTPATIENT)
Dept: INTERNAL MEDICINE | Facility: CLINIC | Age: 82
End: 2022-11-21
Payer: MEDICARE

## 2022-11-21 ENCOUNTER — TELEPHONE (OUTPATIENT)
Dept: ENDOSCOPY | Facility: HOSPITAL | Age: 82
End: 2022-11-21
Payer: MEDICARE

## 2022-11-22 ENCOUNTER — PATIENT MESSAGE (OUTPATIENT)
Dept: INTERNAL MEDICINE | Facility: CLINIC | Age: 82
End: 2022-11-22
Payer: MEDICARE

## 2022-11-22 DIAGNOSIS — R14.0 BLOATING: Primary | ICD-10-CM

## 2022-11-30 ENCOUNTER — SPECIALTY PHARMACY (OUTPATIENT)
Dept: PHARMACY | Facility: CLINIC | Age: 82
End: 2022-11-30
Payer: MEDICARE

## 2022-11-30 NOTE — TELEPHONE ENCOUNTER
Specialty Pharmacy - Refill Coordination    Specialty Medication Orders Linked to Encounter      Flowsheet Row Most Recent Value   Medication #1 dupilumab (DUPIXENT SYRINGE) 300 mg/2 mL Syrg (Order#[id number], Rx#[id number])            Refill Questions - Documented Responses      Flowsheet Row Most Recent Value   Patient Availability and HIPAA Verification    Does patient want to proceed with activity? Yes   HIPAA/medical authority confirmed? Yes   Relationship to patient of person spoken to? Self   Refill Screening Questions    Changes to allergies? No   Changes to medications? No   New conditions since last clinic visit? No   Unplanned office visit, urgent care, ED, or hospital admission in the last 4 weeks? No   How does patient/caregiver feel medication is working? Good   Financial problems or insurance changes? No   How many doses of your specialty medications were missed in the last 4 weeks? 0   Would patient like to speak to a pharmacist? No   When does the patient need to receive the medication? 12/07/22   Refill Delivery Questions    How will the patient receive the medication? MEDRx   When does the patient need to receive the medication? 12/07/22   Shipping Address Home   Address in [facility] confirmed and updated if neccessary? Yes   Expected Copay ($) 40   Is the patient able to afford the medication copay? Yes   Payment Method CC on file   Days supply of Refill 28   Supplies needed? No supplies needed   Refill activity completed? Yes   Refill activity plan Refill scheduled   Shipment/Pickup Date: 12/05/22            Current Outpatient Medications   Medication Sig    dupilumab (DUPIXENT SYRINGE) 300 mg/2 mL Syrg Inject 2 mLs (1 syringe) into the skin every 14 (fourteen) days.    KEPPRA  mg Tb24 24 hr tablet Take 5 tablets (2,500 mg total) by mouth once daily.    olopatadine (PATANOL) 0.1 % ophthalmic solution INSTILL 1 DROP IN BOTH EYES TWICE DAILY AS NEEDED    triamcinolone acetonide 0.1%  (KENALOG) 0.1 % cream Apply topically.   Last reviewed on 10/14/2022 10:59 AM by Derek Cruz MD    Review of patient's allergies indicates:   Allergen Reactions    House dust mite Hives, Itching, Rash and Swelling    Last reviewed on  10/17/2022 8:15 AM by Maci Trotter      Tasks added this encounter   12/28/2022 - Refill Call (Auto Added)   Tasks due within next 3 months   No tasks due.     Dinora Lewis [facility] Pharm[facility]  [address] [place] [address]  Phone: [phone]  Fax: [phone]

## 2022-12-09 ENCOUNTER — PATIENT MESSAGE (OUTPATIENT)
Dept: NEUROLOGY | Facility: CLINIC | Age: 82
End: 2022-12-09
Payer: MEDICARE

## 2022-12-12 ENCOUNTER — PATIENT MESSAGE (OUTPATIENT)
Dept: OPTOMETRY | Facility: CLINIC | Age: 82
End: 2022-12-12
Payer: MEDICARE

## 2022-12-28 ENCOUNTER — SPECIALTY PHARMACY (OUTPATIENT)
Dept: PHARMACY | Facility: CLINIC | Age: 82
End: 2022-12-28
Payer: MEDICARE

## 2022-12-28 NOTE — TELEPHONE ENCOUNTER
Specialty Pharmacy - Refill Coordination    Specialty Medication Orders Linked to Encounter      Flowsheet Row Most Recent Value   Medication #1 dupilumab (DUPIXENT SYRINGE) 300 mg/2 mL Syrg (Order#[id number], Rx#[id number])            Refill Questions - Documented Responses      Flowsheet Row Most Recent Value   Patient Availability and HIPAA Verification    Does patient want to proceed with activity? Yes   HIPAA/medical authority confirmed? Yes   Relationship to patient of person spoken to? Self   Refill Screening Questions    Changes to allergies? No   Changes to medications? No   New conditions since last clinic visit? No   Unplanned office visit, urgent care, ED, or hospital admission in the last 4 weeks? No   How does patient/caregiver feel medication is working? Good   Financial problems or insurance changes? No   How many doses of your specialty medications were missed in the last 4 weeks? 0   Would patient like to speak to a pharmacist? No   When does the patient need to receive the medication? 01/04/23   Refill Delivery Questions    How will the patient receive the medication? MEDRx   When does the patient need to receive the medication? 01/04/23   Shipping Address Home   Address in [facility] confirmed and updated if neccessary? Yes   Expected Copay ($) 40   Is the patient able to afford the medication copay? Yes   Payment Method CC on file   Days supply of Refill 28   Supplies needed? No supplies needed   Refill activity completed? Yes   Refill activity plan Refill scheduled   Shipment/Pickup Date: 12/29/22            Current Outpatient Medications   Medication Sig    dupilumab (DUPIXENT SYRINGE) 300 mg/2 mL Syrg Inject 2 mLs (1 syringe) into the skin every 14 (fourteen) days.    KEPPRA  mg Tb24 24 hr tablet Take 5 tablets (2,500 mg total) by mouth once daily.    olopatadine (PATANOL) 0.1 % ophthalmic solution INSTILL 1 DROP IN BOTH EYES TWICE DAILY AS NEEDED    triamcinolone acetonide 0.1%  (KENALOG) 0.1 % cream Apply topically.   Last reviewed on 10/14/2022 10:59 AM by Derek Cruz MD    Review of patient's allergies indicates:   Allergen Reactions    House dust mite Hives, Itching, Rash and Swelling    Last reviewed on  10/17/2022 8:15 AM by Maci Trotter      Tasks added this encounter   No tasks added.   Tasks due within next 3 months   3/14/2023 - Clinical - Follow Up Assesement (Annual)  1/25/2023 - Refill Call (Auto Added)     Fouzia Mckeon, PharmD  [facility] - Special[facility] Pharm[facility]  [address] [facility] [address]  Phone: [phone]  Fax: [phone]

## 2023-01-04 ENCOUNTER — PATIENT MESSAGE (OUTPATIENT)
Dept: INTERNAL MEDICINE | Facility: CLINIC | Age: 83
End: 2023-01-04
Payer: MEDICARE

## 2023-01-04 DIAGNOSIS — Z78.9 HISTORY OF INTERNATIONAL TRAVEL: Primary | ICD-10-CM

## 2023-01-05 ENCOUNTER — PATIENT MESSAGE (OUTPATIENT)
Dept: INTERNAL MEDICINE | Facility: CLINIC | Age: 83
End: 2023-01-05
Payer: MEDICARE

## 2023-01-10 ENCOUNTER — PATIENT MESSAGE (OUTPATIENT)
Dept: NEUROLOGY | Facility: CLINIC | Age: 83
End: 2023-01-10
Payer: MEDICARE

## 2023-01-24 ENCOUNTER — TELEPHONE (OUTPATIENT)
Dept: INFECTIOUS DISEASES | Facility: CLINIC | Age: 83
End: 2023-01-24
Payer: MEDICARE

## 2023-01-24 NOTE — TELEPHONE ENCOUNTER
----- Message from [place] sent at 1/24/2023  9:13 AM CST -----  Contact: [phone]  Pt is calling back from a missed call.  Pt would like a call back at your earliest convenience.  Pt can be reached at. [phone]

## 2023-01-25 ENCOUNTER — SPECIALTY PHARMACY (OUTPATIENT)
Dept: PHARMACY | Facility: CLINIC | Age: 83
End: 2023-01-25
Payer: MEDICARE

## 2023-01-25 NOTE — TELEPHONE ENCOUNTER
Specialty Pharmacy - Refill Coordination    Specialty Medication Orders Linked to Encounter      Flowsheet Row Most Recent Value   Medication #1 dupilumab (DUPIXENT SYRINGE) 300 mg/2 mL Syrg (Order#[id number], Rx#[id number])            Refill Questions - Documented Responses      Flowsheet Row Most Recent Value   Patient Availability and HIPAA Verification    Does patient want to proceed with activity? Yes   HIPAA/medical authority confirmed? Yes   Relationship to patient of person spoken to? Self   Refill Screening Questions    Changes to allergies? No   Changes to medications? No   New conditions since last clinic visit? No   Unplanned office visit, urgent care, ED, or hospital admission in the last 4 weeks? No   How does patient/caregiver feel medication is working? Excellent   Financial problems or insurance changes? No   How many doses of your specialty medications were missed in the last 4 weeks? 0   Would patient like to speak to a pharmacist? No   When does the patient need to receive the medication? 02/01/23   Refill Delivery Questions    How will the patient receive the medication? MEDRx   When does the patient need to receive the medication? 02/01/23   Shipping Address Home   Address in [facility] confirmed and updated if neccessary? Yes   Expected Copay ($) 80   Is the patient able to afford the medication copay? Yes   Days supply of Refill 28   Refill activity completed? Yes   Refill activity plan Refill scheduled   Shipment/Pickup Date: 01/26/23            Current Outpatient Medications   Medication Sig    dupilumab (DUPIXENT SYRINGE) 300 mg/2 mL Syrg Inject 2 mLs (1 syringe) into the skin every 14 (fourteen) days.    KEPPRA  mg Tb24 24 hr tablet Take 5 tablets (2,500 mg total) by mouth once daily.    olopatadine (PATANOL) 0.1 % ophthalmic solution INSTILL 1 DROP IN BOTH EYES TWICE DAILY AS NEEDED    triamcinolone acetonide 0.1% (KENALOG) 0.1 % cream Apply topically.   Last reviewed on  10/14/2022 10:59 AM by Derek Cruz MD    Review of patient's allergies indicates:   Allergen Reactions    House dust mite Hives, Itching, Rash and Swelling    Last reviewed on  10/17/2022 8:15 AM by Maci Trotter      Tasks added this encounter   2/22/2023 - Refill Call (Auto Added)   Tasks due within next 3 months   3/14/2023 - Clinical - Follow Up Assesement (Annual)     Arron AntonioD  [facility]  [address] [place] [address]  Phone: [phone]  Fax: [phone]

## 2023-02-01 ENCOUNTER — TELEPHONE (OUTPATIENT)
Dept: ALLERGY | Facility: CLINIC | Age: 83
End: 2023-02-01
Payer: MEDICARE

## 2023-02-01 ENCOUNTER — PATIENT MESSAGE (OUTPATIENT)
Dept: ALLERGY | Facility: CLINIC | Age: 83
End: 2023-02-01
Payer: MEDICARE

## 2023-02-01 NOTE — TELEPHONE ENCOUNTER
----- Message from Ryann Cavanaugh sent at 2/1/2023 12:43 PM CST -----  Regarding: Appointment  Contact: [phone]  Calling to confirm with nurse an afternoon appointment on tomorrow or any time on Friday, portal message didn't go through. Please call to confirm.

## 2023-02-02 ENCOUNTER — TELEPHONE (OUTPATIENT)
Dept: ALLERGY | Facility: CLINIC | Age: 83
End: 2023-02-02
Payer: MEDICARE

## 2023-02-02 ENCOUNTER — OFFICE VISIT (OUTPATIENT)
Dept: ALLERGY | Facility: CLINIC | Age: 83
End: 2023-02-02
Payer: MEDICARE

## 2023-02-02 VITALS
OXYGEN SATURATION: 98 % | WEIGHT: 130.31 LBS | DIASTOLIC BLOOD PRESSURE: 70 MMHG | BODY MASS INDEX: 22.36 KG/M2 | HEART RATE: 65 BPM | SYSTOLIC BLOOD PRESSURE: 133 MMHG

## 2023-02-02 DIAGNOSIS — J30.89 ALLERGIC RHINITIS DUE TO DUST MITE: ICD-10-CM

## 2023-02-02 DIAGNOSIS — H01.139 ECZEMA OF EYELID, UNSPECIFIED LATERALITY: ICD-10-CM

## 2023-02-02 DIAGNOSIS — L20.9 ATOPIC DERMATITIS, UNSPECIFIED TYPE: Primary | ICD-10-CM

## 2023-02-02 PROCEDURE — 99999 PR PBB SHADOW E&M-EST. PATIENT-LVL III: CPT | Mod: PBBFAC,,, | Performed by: ALLERGY & IMMUNOLOGY

## 2023-02-02 PROCEDURE — 99999 PR PBB SHADOW E&M-EST. PATIENT-LVL III: ICD-10-PCS | Mod: PBBFAC,,, | Performed by: ALLERGY & IMMUNOLOGY

## 2023-02-02 PROCEDURE — 99214 OFFICE O/P EST MOD 30 MIN: CPT | Mod: S$PBB,,, | Performed by: ALLERGY & IMMUNOLOGY

## 2023-02-02 PROCEDURE — 99213 OFFICE O/P EST LOW 20 MIN: CPT | Mod: PBBFAC | Performed by: ALLERGY & IMMUNOLOGY

## 2023-02-02 PROCEDURE — 99214 PR OFFICE/OUTPT VISIT, EST, LEVL IV, 30-39 MIN: ICD-10-PCS | Mod: S$PBB,,, | Performed by: ALLERGY & IMMUNOLOGY

## 2023-02-02 RX ORDER — TACROLIMUS 1 MG/G
OINTMENT TOPICAL 2 TIMES DAILY
Qty: 100 G | Refills: 6 | Status: SHIPPED | OUTPATIENT
Start: 2023-02-02 | End: 2023-08-23

## 2023-02-02 RX ORDER — TRIAMCINOLONE ACETONIDE 1 MG/G
CREAM TOPICAL 2 TIMES DAILY
Qty: 454 G | Refills: 4 | Status: SHIPPED | OUTPATIENT
Start: 2023-02-02 | End: 2023-08-23

## 2023-02-02 NOTE — TELEPHONE ENCOUNTER
----- Message from Tessie Aguilar sent at 2/2/2023  8:51 AM CST -----  Regarding: PA  Contact: [phone]  [facility] requesting prior auth for the following meds dupilumab (DUPIXENT SYRINGE) 300 mg/2 mL Syrg . Please call and adv @ [phone]

## 2023-02-02 NOTE — PROGRESS NOTES
Subjective:      Patient ID: Gabriela Edwards is a 82 y.o. female.    LV 9/30/20    Chief Complaint: Allergies and Other (Dupixent review)  fu atopic dermatitis, dupixent      History of Present Illness:     Pt presents for fu atopic dermatitis, on Dupixent since approx Oct '20.  Pt has noted significant improvement in atopic dermatitis since starting Dupixent. She starting noticing significant improvement within a month of starting therapy. Need for topical steroids has decreased dramatically. Will use prn topical TCN 0.1% cream on neck about once per month. Eyelid dermatitis is about the same, not worse, as it was prior to starting dupxient. Denies any side effects.  Rhinitis sx's unchanged.    Hx from 9/30/20:  Pt w hx chronic/recurrent idiopathic urticaria for approx 3+ years, as well as more recent atopic dermatitis.  Urticaria responded well to omalizumab.  Over the ~4 months though, pt has been affected more by chronic, recurrent atopic dermatitis rather than urticaria. Face, trunk and arms are most commonly affected. Poor response to topical steroids. Has needed multiple courses oral steroids for relief. Sleep often disturbed. No relief from pruritus w high dose antihistamines.  Recalls IT as child, for AR 3 times weekly 5 yrs  No asthma     Additional History:  Pt has seizure disorder, on Keppra x 20 yrs.  No changes.   Patient Active Problem List   Diagnosis    DON (conjunctival intraepithelial neoplasia)    Urticaria, idiopathic    Allergic rhinitis    Eczema    Nonintractable generalized idiopathic epilepsy without status epilepticus    Age-related osteoporosis without current pathological fracture       Review of Systems   Constitutional:  Negative for chills, fever and malaise/fatigue.   HENT:  Negative for congestion, ear discharge, ear pain, hearing loss, nosebleeds, sinus pain and sore throat.    Eyes:  Negative for photophobia, pain, discharge and redness.        Itching   Respiratory:   Negative for cough, hemoptysis, shortness of breath, wheezing and stridor.    Cardiovascular:  Negative for chest pain and palpitations.   Gastrointestinal:  Negative for abdominal pain, diarrhea, nausea and vomiting.   Skin:  Negative for itching and rash.   Neurological:  Negative for seizures and loss of consciousness.   Psychiatric/Behavioral:  The patient is not nervous/anxious.      Objective:     Vitals:    02/02/23 1127   BP: 133/70   Pulse: 65       Physical Exam  HENT:      Head: Normocephalic and atraumatic.      Right Ear: External ear normal.      Left Ear: External ear normal.      Nose: Nose normal.   Eyes:      General: No scleral icterus.     Conjunctiva/sclera: Conjunctivae normal.   Pulmonary:      Effort: Pulmonary effort is normal. No respiratory distress.   Abdominal:      General: There is no distension.   Musculoskeletal:      Cervical back: Normal range of motion.   Skin:     Findings: No erythema.   Neurological:      Mental Status: She is alert and oriented to person, place, and time.   Psychiatric:         Mood and Affect: Mood normal.         Behavior: Behavior normal.         Cognition and Memory: Memory normal.         Judgment: Judgment normal.         Results for ALFONSO DAMIAN (MRN [id number]) as of 9/9/2020 09:26   Ref. Range 8/6/2020 12:50   A. fumigatus Class Unknown CLASS 0   Altern. alternata Class Unknown CLASS 0   Alternaria alternata Latest Ref Range: <0.10 kU/L <0.10   Aspergillus Fumigatus IgE Latest Ref Range: <0.10 kU/L <0.10   Bahia Class Unknown CLASS 0   BAHIA GRASS Latest Ref Range: <0.10 kU/L <0.10   Cat Dander Latest Ref Range: <0.10 kU/L <0.10   Cat Epithelium Class Unknown CLASS 0   Crane Class Unknown CLASS 0   Crane IgE Latest Ref Range: <0.10 kU/L <0.10   Cockroach, IgE Unknown CLASS 1   D. farinae Latest Ref Range: <0.10 kU/L >100.00 (H)   D. farinae Class Unknown CLASS 6   D. pteronyssinus Class Unknown CLASS 6   Dog Dander, IgE Latest Ref Range: <0.10  kU/L 0.67 (H)   Dog Dander Class Unknown CLASS 1   Marshelder Class Unknown CLASS 0/1   Marshelder IgE Latest Ref Range: <0.10 kU/L 0.26 (H)   Mite Dust Pteronyssinus IgE Latest Ref Range: <0.10 kU/L >100.00 (H)   Oak, Class Unknown CLASS 0   Pecan [place] Tree Latest Ref Range: <0.10 kU/L <0.10   Pecan, Class Unknown CLASS 0   Ragweed, Short, Class Unknown CLASS 0   Ragweed, Short, IgE Latest Ref Range: <0.10 kU/L <0.10   Jose Grass Latest Ref Range: <0.10 kU/L <0.10   Jose Grass Class Unknown CLASS 0   White Oak(Quercus alba) IgE Latest Ref Range: <0.10 kU/L <0.10   Results for ALFONSO DAMIAN (MRN [id number]) as of 9/9/2020 09:26   Ref. Range 8/6/2020 12:55   IgE Latest Ref Range: 0 - 100 IU/mL 586 (H)       Assessment:     1. Atopic dermatitis, severe--markedly improved on Dupixent   2. Allergic rhinitis       Plan:     Continue Dupixent 300 mg q 2 weeks.    aquaphor, vaseline to keep affected areas moisturized  Prn TCN 0.1% cream to affected areas on neck, chest, shoulders   Prn protopic for flares of eyelid eczema    Fu 1 year, sooner prn

## 2023-02-08 ENCOUNTER — PATIENT MESSAGE (OUTPATIENT)
Dept: ALLERGY | Facility: CLINIC | Age: 83
End: 2023-02-08
Payer: MEDICARE

## 2023-02-10 ENCOUNTER — CLINICAL SUPPORT (OUTPATIENT)
Dept: INFECTIOUS DISEASES | Facility: CLINIC | Age: 83
End: 2023-02-10
Payer: MEDICARE

## 2023-02-10 ENCOUNTER — OFFICE VISIT (OUTPATIENT)
Dept: INFECTIOUS DISEASES | Facility: CLINIC | Age: 83
End: 2023-02-10
Payer: MEDICARE

## 2023-02-10 VITALS
SYSTOLIC BLOOD PRESSURE: 125 MMHG | WEIGHT: 128.31 LBS | TEMPERATURE: 98 F | BODY MASS INDEX: 21.91 KG/M2 | DIASTOLIC BLOOD PRESSURE: 74 MMHG | HEART RATE: 69 BPM | HEIGHT: 64 IN

## 2023-02-10 DIAGNOSIS — Z23 IMMUNIZATION DUE: ICD-10-CM

## 2023-02-10 DIAGNOSIS — Z71.84 TRAVEL ADVICE ENCOUNTER: Primary | ICD-10-CM

## 2023-02-10 PROCEDURE — 99999 PR PBB SHADOW E&M-EST. PATIENT-LVL III: CPT | Mod: PBBFAC,,, | Performed by: INTERNAL MEDICINE

## 2023-02-10 PROCEDURE — 99402 PR PREVENT COUNSEL,INDIV,30 MIN: ICD-10-PCS | Mod: S$PBB,,, | Performed by: INTERNAL MEDICINE

## 2023-02-10 PROCEDURE — 99999 PR PBB SHADOW E&M-EST. PATIENT-LVL III: ICD-10-PCS | Mod: PBBFAC,,, | Performed by: INTERNAL MEDICINE

## 2023-02-10 PROCEDURE — 90691 TYPHOID VACCINE IM: CPT | Mod: PBBFAC

## 2023-02-10 PROCEDURE — 99213 OFFICE O/P EST LOW 20 MIN: CPT | Mod: PBBFAC | Performed by: INTERNAL MEDICINE

## 2023-02-10 PROCEDURE — 99402 PREV MED CNSL INDIV APPRX 30: CPT | Mod: S$PBB,,, | Performed by: INTERNAL MEDICINE

## 2023-02-10 RX ORDER — ATOVAQUONE AND PROGUANIL HYDROCHLORIDE 250; 100 MG/1; MG/1
TABLET, FILM COATED ORAL
Qty: 24 TABLET | Refills: 0 | Status: SHIPPED | OUTPATIENT
Start: 2023-02-10 | End: 2023-08-23

## 2023-02-10 RX ORDER — ONDANSETRON 4 MG/1
4 TABLET, FILM COATED ORAL DAILY PRN
Qty: 30 TABLET | Refills: 1 | Status: SHIPPED | OUTPATIENT
Start: 2023-02-10 | End: 2023-08-23

## 2023-02-10 RX ORDER — AZITHROMYCIN 500 MG/1
500 TABLET, FILM COATED ORAL DAILY
Qty: 3 TABLET | Refills: 0 | Status: SHIPPED | OUTPATIENT
Start: 2023-02-10 | End: 2023-02-13

## 2023-02-10 NOTE — PROGRESS NOTES
Subjective:      Chief Complaint:   Chief Complaint   Patient presents with    Travel Consult       History of Present Illness    Patient  82 y.o. female who presents today for routine pretravel consultation.  The patient reports a past medical history of eczema (dupixant) and epilepsy (takes 5 medications).  The patient reports the following medication allergies; none.  The patient reports the following food allergies; none .  The patient will be traveling to  on [facility].  Fly into [place] for 1 night, then spend time in [facility] and some time in [facility].  The patient will be at this destination for 10 days.  The patient will be lodging at luxury facilities.  The patient has travelled to the following other countries in the past; [place], [place].  The patient reports that they received all their childhood vaccinations.  The patient reports receipt of the following travel related vaccinations; Hepatitis A #1 in 2016, Typhoid IM in 2016, Tdap in 2021, shingrix X2 in 2020, COVID X5 doses, prevnar in 2020 and pneumovax 2021.  The purpose of this trip is vacation.      Review of Systems   All other systems reviewed and are negative.    Objective:   Physical Exam   Assessment:     Pre-Travel clinic assessment    Plan:   Patient specific risks:      Patient is of advanced age.    Destination specific risks:      -Infectious Disease risks:       Mosquito Borne pathogens:  Reviewed basic mosquito avoidance precautions including wearing long sleeve clothing and insect repellant.  Malarone for malaria prevention.       Food Borne pathogens:  Reviewed basic hand, food and water sanitation precautions.  Patient instructed to take hand  on their trip.  Hepatitis A and Typhoid IM vaccine ordered.  Azithromycin as needed severe diarrhea.  Zofran as needed for nausea.     Routine:  She is up to date on influenza, tetanus and covid vaccines    Encounter took 30 minutes.

## 2023-02-13 ENCOUNTER — CLINICAL SUPPORT (OUTPATIENT)
Dept: INFECTIOUS DISEASES | Facility: CLINIC | Age: 83
End: 2023-02-13
Payer: MEDICARE

## 2023-02-13 PROCEDURE — 90471 IMMUNIZATION ADMIN: CPT | Mod: PBBFAC

## 2023-02-13 PROCEDURE — 90632 HEPA VACCINE ADULT IM: CPT | Mod: PBBFAC

## 2023-02-13 NOTE — PROGRESS NOTES
Patient received  Hep A #1 vaccine in the left deltoid. Pt tolerated well. Pt asked to wait in the clinic 15 minutes after injection in the event of an allergic reaction. Pt verbalized understanding. Pt left in NAD.

## 2023-02-23 ENCOUNTER — PATIENT MESSAGE (OUTPATIENT)
Dept: ALLERGY | Facility: CLINIC | Age: 83
End: 2023-02-23
Payer: MEDICARE

## 2023-02-27 ENCOUNTER — SPECIALTY PHARMACY (OUTPATIENT)
Dept: PHARMACY | Facility: CLINIC | Age: 83
End: 2023-02-27
Payer: MEDICARE

## 2023-02-27 NOTE — TELEPHONE ENCOUNTER
Specialty Pharmacy - Refill Coordination    Specialty Medication Orders Linked to Encounter      Flowsheet Row Most Recent Value   Medication #1 dupilumab (DUPIXENT SYRINGE) 300 mg/2 mL Syrg (Order#[id number], Rx#[id number])            Refill Questions - Documented Responses      Flowsheet Row Most Recent Value   Patient Availability and HIPAA Verification    Does patient want to proceed with activity? Yes   HIPAA/medical authority confirmed? Yes   Relationship to patient of person spoken to? Self   Refill Screening Questions    Changes to allergies? No   Changes to medications? No   New conditions since last clinic visit? No   Unplanned office visit, urgent care, ED, or hospital admission in the last 4 weeks? No   How does patient/caregiver feel medication is working? Excellent   Financial problems or insurance changes? No   How many doses of your specialty medications were missed in the last 4 weeks? 0   Would patient like to speak to a pharmacist? No   When does the patient need to receive the medication? 03/01/23   Refill Delivery Questions    How will the patient receive the medication? MEDRx   When does the patient need to receive the medication? 03/01/23   Shipping Address Home   Address in [facility] confirmed and updated if neccessary? Yes   Expected Copay ($) 80   Is the patient able to afford the medication copay? Yes   Payment Method CC on file   Days supply of Refill 28   Supplies needed? No supplies needed   Refill activity completed? Yes   Refill activity plan Refill scheduled   Shipment/Pickup Date: 02/27/23            Current Outpatient Medications   Medication Sig    atovaquone-proguaniL (MALARONE) 250-100 mg Tab Take one tablet daily for malaria prevention. Begin one day before entering malarious area and continue for 1 week after return.    dupilumab (DUPIXENT SYRINGE) 300 mg/2 mL Syrg Inject 2 mLs (1 syringe) into the skin every 14 (fourteen) days.    KEPPRA  mg Tb24 24 hr tablet  Take 5 tablets (2,500 mg total) by mouth once daily.    olopatadine (PATANOL) 0.1 % ophthalmic solution INSTILL 1 DROP IN BOTH EYES TWICE DAILY AS NEEDED    ondansetron (ZOFRAN) 4 MG tablet Take 1 tablet (4 mg total) by mouth daily as needed for Nausea.    tacrolimus (PROTOPIC) 0.1 % ointment Apply topically 2 (two) times daily. As needed for exacerbations of eyelid eczema    triamcinolone acetonide 0.1% (KENALOG) 0.1 % cream Apply topically 2 (two) times daily. Apply to affected areas as needed twice daily. Avoid use on face.   Last reviewed on 2/12/2023  9:01 PM by Thierry Raymond MD    Review of patient's allergies indicates:   Allergen Reactions    House dust mite Hives, Itching, Rash and Swelling    Last reviewed on  2/12/2023 9:01 PM by Thierry Raymond      Tasks added this encounter   3/22/2023 - Refill Call (Auto Added)   Tasks due within next 3 months   3/14/2023 - Clinical - Follow Up Assesement (Annual)     [facility] - [facility]ty Pharm[facility]  [address] [place] [address]  Phone: [phone]  Fax: [phone]

## 2023-03-22 ENCOUNTER — SPECIALTY PHARMACY (OUTPATIENT)
Dept: PHARMACY | Facility: CLINIC | Age: 83
End: 2023-03-22
Payer: MEDICARE

## 2023-03-22 NOTE — TELEPHONE ENCOUNTER
Specialty Pharmacy - Clinical Reassessment  Specialty Pharmacy - Refill Coordination    Specialty Medication Orders Linked to Encounter      Flowsheet Row Most Recent Value   Medication #1 dupilumab (DUPIXENT SYRINGE) 300 mg/2 mL Syrg (Order#[id number], Rx#[id number])            Refill Questions - Documented Responses      Flowsheet Row Most Recent Value   Patient Availability and HIPAA Verification    Does patient want to proceed with activity? Yes   HIPAA/medical authority confirmed? Yes   Relationship to patient of person spoken to? Self   Refill Screening Questions    Changes to allergies? No   Changes to medications? No   New conditions since last clinic visit? No   Unplanned office visit, urgent care, ED, or hospital admission in the last 4 weeks? No   How does patient/caregiver feel medication is working? Excellent   Financial problems or insurance changes? No   How many doses of your specialty medications were missed in the last 4 weeks? 0   Would patient like to speak to a pharmacist? Yes   When does the patient need to receive the medication? 03/29/23   Refill Delivery Questions    How will the patient receive the medication? MEDRx   When does the patient need to receive the medication? 03/29/23   Shipping Address Home   Address in [facility] confirmed and updated if neccessary? Yes   Expected Copay ($) 80   Is the patient able to afford the medication copay? Yes   Payment Method CC on file   Days supply of Refill 28   Supplies needed? Alcohol Swabs   Refill activity completed? Yes   Refill activity plan Refill scheduled   Shipment/Pickup Date: 03/24/23            Current Outpatient Medications   Medication Sig    atovaquone-proguaniL (MALARONE) 250-100 mg Tab Take one tablet daily for malaria prevention. Begin one day before entering malarious area and continue for 1 week after return.    dupilumab (DUPIXENT SYRINGE) 300 mg/2 mL Syrg Inject 2 mLs (1 syringe) into the skin every 14 (fourteen) days.     KEPPRA  mg Tb24 24 hr tablet Take 5 tablets (2,500 mg total) by mouth once daily.    olopatadine (PATANOL) 0.1 % ophthalmic solution INSTILL 1 DROP IN BOTH EYES TWICE DAILY AS NEEDED    ondansetron (ZOFRAN) 4 MG tablet Take 1 tablet (4 mg total) by mouth daily as needed for Nausea.    tacrolimus (PROTOPIC) 0.1 % ointment Apply topically 2 (two) times daily. As needed for exacerbations of eyelid eczema    triamcinolone acetonide 0.1% (KENALOG) 0.1 % cream Apply topically 2 (two) times daily. Apply to affected areas as needed twice daily. Avoid use on face.   Last reviewed on 3/22/2023  1:36 PM by Ervin Torres PharmD    Review of patient's allergies indicates:   Allergen Reactions    House dust mite Hives, Itching, Rash and Swelling    Last reviewed on  3/22/2023 1:36 PM by Ervin Torres    Interventions added this encounter   Closed: [facility] Patient Intervention - Patient educational resources: dupilumab (DUPIXENT SYRINGE) 300 mg/2 mL Syrg     Tasks added this encounter   12/22/2023 - Clinical - Follow Up Assesement (Annual)  4/19/2023 - Refill Call (Auto Added)   Tasks due within next 3 months   No tasks due.     Ervin Torres, PharmD  [facility]  [address] [place] [address]  Phone: [phone]  Fax: [phone]

## 2023-03-22 NOTE — TELEPHONE ENCOUNTER
Specialty Pharmacy - Clinical Reassessment    Specialty Medication Orders Linked to Encounter      Flowsheet Row Most Recent Value   Medication #1 dupilumab (DUPIXENT SYRINGE) 300 mg/2 mL Syrg (Order#[id number], Rx#[id number])          Patient Diagnosis   L30.9 - Eczema    Gabriela Edwards is a 82 y.o. female, who is followed by the specialty pharmacy service for management and education of her Dupixent.  She has been on therapy with Dupixent for 2.5 years.  I have reviewed her electronic medical record and current medication list and determined that specialty medication adjustment Is not needed at this time.    Patient has not experienced adverse events.  She Is adherent reporting 0 missed doses since last review.  Adherence has been encouraged with the following mechanism(s): proactive refill calls.  She is meeting goals of therapy and will continue treatment.        2/27/2023 1/25/2023 12/28/2022 11/30/2022 11/2/2022 10/5/2022 9/7/2022   Follow Up Review   # of missed doses 0 0 0 0 0 0 0   New Medications? No No No No No No No   New Conditions? No No No No No No No   New Allergies? No No No No No No No   Med Effective? Excellent Excellent Good Good Good Excellent Excellent   Urgent Care? No No No No No No No   Requested Pharmacist? No No No No No No No            Therapy is appropriate to continue.    Therapy is effective: Yes  On scale of 1 to 10, how does patient rank quality of life? (10 - Best): Unable to Assess  Recommendations: none at this time.  Review Method: Chart Review    Tasks added this encounter   12/22/2023 - Clinical - Follow Up Assesement (Annual)   Tasks due within next 3 months   3/22/2023 - Refill Call (Auto Added)     Ervin Torres, PharmD  [facility]  [address] [facility] [address]  Phone: [phone]  Fax: [phone]

## 2023-04-03 ENCOUNTER — OFFICE VISIT (OUTPATIENT)
Dept: OPTOMETRY | Facility: CLINIC | Age: 83
End: 2023-04-03
Payer: MEDICARE

## 2023-04-03 DIAGNOSIS — H10.13 ALLERGIC CONJUNCTIVITIS OF BOTH EYES: ICD-10-CM

## 2023-04-03 DIAGNOSIS — L23.9 ALLERGIC CONTACT DERMATITIS, UNSPECIFIED TRIGGER: Primary | ICD-10-CM

## 2023-04-03 DIAGNOSIS — Z96.1 PSEUDOPHAKIA OF BOTH EYES: ICD-10-CM

## 2023-04-03 PROCEDURE — 99999 PR PBB SHADOW E&M-EST. PATIENT-LVL II: ICD-10-PCS | Mod: PBBFAC,,, | Performed by: OPTOMETRIST

## 2023-04-03 PROCEDURE — 99999 PR PBB SHADOW E&M-EST. PATIENT-LVL II: CPT | Mod: PBBFAC,,, | Performed by: OPTOMETRIST

## 2023-04-03 PROCEDURE — 92014 COMPRE OPH EXAM EST PT 1/>: CPT | Mod: S$PBB,,, | Performed by: OPTOMETRIST

## 2023-04-03 PROCEDURE — 99212 OFFICE O/P EST SF 10 MIN: CPT | Mod: PBBFAC | Performed by: OPTOMETRIST

## 2023-04-03 PROCEDURE — 92014 PR EYE EXAM, EST PATIENT,COMPREHESV: ICD-10-PCS | Mod: S$PBB,,, | Performed by: OPTOMETRIST

## 2023-04-03 RX ORDER — LEVOCETIRIZINE DIHYDROCHLORIDE 5 MG/1
10 TABLET, FILM COATED ORAL 2 TIMES DAILY
COMMUNITY
Start: 2023-03-03 | End: 2023-07-06 | Stop reason: SDUPTHER

## 2023-04-03 NOTE — PROGRESS NOTES
HPI    Last eye exam was 2/24/22 with Dr. Trotter.  Patient states still having issues with eczema and allergies. Nothing   she's being given helps. When flare up is bad, has to use an eye wash and   eye patch to help with extreme itchy and lid swelling. Sometimes uses ice   packs on eyes to help with flare ups. Has medication for eczema but can't   use it on her eyes.    Patanol q2-3h OU  Maxitrol ginger prn OU      Last edited by Andreea Lemus MA on 4/3/2023  2:42 PM.            Assessment /Plan     For exam results, see Encounter Report.    Allergic contact dermatitis, unspecified trigger    Allergic conjunctivitis of both eyes    Pseudophakia of both eyes            1-2.  Allergies followed by Dr. Kahn--treated with Dupilumab.  Continue with Patanol and Maxitrol ointment as needed during eye flare-ups.  3.  Pt doing well since cataracts surgery.   No rx given.   Retina flat and intact OU--no holes, tears, breaks, or RDs.  Eye health normal OU.      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  No signs of recurrence.

## 2023-04-04 ENCOUNTER — PATIENT MESSAGE (OUTPATIENT)
Dept: OPTOMETRY | Facility: CLINIC | Age: 83
End: 2023-04-04
Payer: MEDICARE

## 2023-04-05 ENCOUNTER — TELEPHONE (OUTPATIENT)
Dept: ENDOSCOPY | Facility: HOSPITAL | Age: 83
End: 2023-04-05
Payer: MEDICARE

## 2023-04-21 ENCOUNTER — SPECIALTY PHARMACY (OUTPATIENT)
Dept: PHARMACY | Facility: CLINIC | Age: 83
End: 2023-04-21
Payer: MEDICARE

## 2023-04-21 NOTE — TELEPHONE ENCOUNTER
Incoming call regarding dupixent refill; per pt, she's due to inject on 4/26; informed her that a PA is required, and once approved [facility] will follow up to schedule delivery; routed to [facility] Ervin

## 2023-04-22 ENCOUNTER — PATIENT MESSAGE (OUTPATIENT)
Dept: ALLERGY | Facility: CLINIC | Age: 83
End: 2023-04-22
Payer: MEDICARE

## 2023-04-24 NOTE — TELEPHONE ENCOUNTER
Specialty Pharmacy - Refill Coordination    Specialty Medication Orders Linked to Encounter      Flowsheet Row Most Recent Value   Medication #1 dupilumab (DUPIXENT SYRINGE) 300 mg/2 mL Syrg (Order#[id number], Rx#[id number])            Refill Questions - Documented Responses      Flowsheet Row Most Recent Value   Patient Availability and HIPAA Verification    Does patient want to proceed with activity? Yes   HIPAA/medical authority confirmed? Yes   Relationship to patient of person spoken to? Self   Refill Screening Questions    Changes to allergies? No   Changes to medications? No   New conditions since last clinic visit? No   Unplanned office visit, urgent care, ED, or hospital admission in the last 4 weeks? No   How does patient/caregiver feel medication is working? Good   Financial problems or insurance changes? No   How many doses of your specialty medications were missed in the last 4 weeks? 0   Would patient like to speak to a pharmacist? No   When does the patient need to receive the medication? 04/26/23   Refill Delivery Questions    How will the patient receive the medication? MEDRx   When does the patient need to receive the medication? 04/26/23   Shipping Address Home   Address in [facility] confirmed and updated if neccessary? Yes   Expected Copay ($) 80   Is the patient able to afford the medication copay? Yes   Payment Method CC on file   Days supply of Refill 28   Supplies needed? No supplies needed   Refill activity completed? Yes   Refill activity plan Refill scheduled   Shipment/Pickup Date: 04/25/23            Current Outpatient Medications   Medication Sig    atovaquone-proguaniL (MALARONE) 250-100 mg Tab Take one tablet daily for malaria prevention. Begin one day before entering malarious area and continue for 1 week after return.    dupilumab (DUPIXENT SYRINGE) 300 mg/2 mL Syrg Inject 2 mLs (1 syringe) into the skin every 14 (fourteen) days.    KEPPRA  mg Tb24 24 hr tablet Take 5  tablets (2,500 mg total) by mouth once daily.    levocetirizine (XYZAL) 5 MG tablet Take 10 mg by mouth 2 (two) times daily.    olopatadine (PATANOL) 0.1 % ophthalmic solution INSTILL 1 DROP IN BOTH EYES TWICE DAILY AS NEEDED    ondansetron (ZOFRAN) 4 MG tablet Take 1 tablet (4 mg total) by mouth daily as needed for Nausea.    tacrolimus (PROTOPIC) 0.1 % ointment Apply topically 2 (two) times daily. As needed for exacerbations of eyelid eczema (Patient not taking: Reported on 4/3/2023)    triamcinolone acetonide 0.1% (KENALOG) 0.1 % cream Apply topically 2 (two) times daily. Apply to affected areas as needed twice daily. Avoid use on face.   Last reviewed on 4/3/2023  3:19 PM by Maci Trotter, BERNADETTE    Review of patient's allergies indicates:   Allergen Reactions    House dust mite Hives, Itching, Rash and Swelling    Last reviewed on  4/3/2023 3:19 PM by Maci Trotter      Tasks added this encounter   No tasks added.   Tasks due within next 3 months   4/21/2023 - Benefits Investigation  4/19/2023 - Refill Coordination Outreach (1 time occurrence)     [facility]  [address] [facility] [address]  Phone: [phone]  Fax: [phone]

## 2023-05-02 ENCOUNTER — PATIENT MESSAGE (OUTPATIENT)
Dept: NEUROLOGY | Facility: CLINIC | Age: 83
End: 2023-05-02
Payer: MEDICARE

## 2023-05-02 ENCOUNTER — OFFICE VISIT (OUTPATIENT)
Dept: URGENT CARE | Facility: CLINIC | Age: 83
End: 2023-05-02
Payer: MEDICARE

## 2023-05-02 ENCOUNTER — PATIENT MESSAGE (OUTPATIENT)
Dept: INTERNAL MEDICINE | Facility: CLINIC | Age: 83
End: 2023-05-02
Payer: MEDICARE

## 2023-05-02 VITALS
RESPIRATION RATE: 19 BRPM | TEMPERATURE: 98 F | SYSTOLIC BLOOD PRESSURE: 110 MMHG | DIASTOLIC BLOOD PRESSURE: 72 MMHG | OXYGEN SATURATION: 97 % | HEART RATE: 60 BPM

## 2023-05-02 DIAGNOSIS — G40.309 NONINTRACTABLE GENERALIZED IDIOPATHIC EPILEPSY WITHOUT STATUS EPILEPTICUS: ICD-10-CM

## 2023-05-02 DIAGNOSIS — S00.411A ABRASION OF RIGHT EAR CANAL, INITIAL ENCOUNTER: Primary | ICD-10-CM

## 2023-05-02 PROCEDURE — 99213 PR OFFICE/OUTPT VISIT, EST, LEVL III, 20-29 MIN: ICD-10-PCS | Mod: S$GLB,,, | Performed by: FAMILY MEDICINE

## 2023-05-02 PROCEDURE — 99213 OFFICE O/P EST LOW 20 MIN: CPT | Mod: S$GLB,,, | Performed by: FAMILY MEDICINE

## 2023-05-02 RX ORDER — LEVETIRACETAM 500 MG/1
2500 TABLET, FILM COATED, EXTENDED RELEASE ORAL NIGHTLY
Qty: 450 TABLET | Refills: 3 | Status: SHIPPED | OUTPATIENT
Start: 2023-05-02 | End: 2023-08-28 | Stop reason: SDUPTHER

## 2023-05-02 NOTE — PATIENT INSTRUCTIONS
If no improvement or worsening, proceed to ER  Follow-up with ENT    You must understand that you have received treatment at an Urgent Care facility only, and that you may be  released before all of your medical problems are known or treated. Urgent Care facilities are not equipped to  handle life threatening emergencies. It is recommended that you seek care at an Emergency Department for  further evaluation of worsening or concerning symptoms, or possibly life threatening conditions as  discussed.      If you develop chest pain, shortness of breath, throat swelling, tongue swelling, lightheadedness or any other causes for concern, proceed to ER.

## 2023-05-02 NOTE — PROGRESS NOTES
Subjective:      Patient ID: Gabriela Edwards is a 82 y.o. female.    Vitals:  oral temperature is 98.3 °F (36.8 °C). Her blood pressure is 110/72 and her pulse is 60. Her respiration is 19 and oxygen saturation is 97%.     Chief Complaint: Ear Drainage    Patient is an 82-year-old female who presents with bleeding from the right ear that occurred this morning.  She denies any pain.  She reports recent flight from [place] and has concerns about tympanic membrane rupture.  Patient cleans ear with cotton swabs.  She reports feeling something squishy in her ear this morning so she wiped out her ear and there was bright red blood on a cotton swab.    Otalgia   There is pain in the right ear. This is a new problem. The current episode started today. The problem occurs constantly. The problem has been gradually worsening. There has been no fever. The pain is at a severity of 0/10. The patient is experiencing no pain. Associated symptoms include ear discharge. Pertinent negatives include no abdominal pain, coughing, diarrhea, headaches, hearing loss, neck pain, rash, rhinorrhea, sore throat or vomiting. Associated symptoms comments: Bloody discharge. She has tried nothing for the symptoms. The treatment provided mild relief. There is no history of a chronic ear infection, hearing loss or a tympanostomy tube.   HENT:  Positive for ear pain and ear discharge. Negative for hearing loss and sore throat.    Neck: Negative for neck pain.   Respiratory:  Negative for cough.    Gastrointestinal:  Negative for abdominal pain, vomiting and diarrhea.   Skin:  Negative for rash.   Neurological:  Negative for headaches.    Objective:     Physical Exam   Constitutional: She is oriented to person, place, and time. She appears well-developed. She is cooperative.  Non-toxic appearance. She does not appear ill. No distress.   HENT:   Head: Normocephalic and atraumatic.   Ears:   Right Ear: Hearing, tympanic membrane, external ear and ear  canal normal. There is drainage (blood). No swelling or tenderness. Tympanic membrane is not injected, not perforated, not erythematous, not retracted and not bulging. No middle ear effusion.   Left Ear: Hearing, tympanic membrane, external ear and ear canal normal. No no drainage (blood), swelling or tenderness. Tympanic membrane is not injected, not perforated, not erythematous, not retracted and not bulging.   Nose: Nose normal. No mucosal edema, rhinorrhea or nasal deformity. No epistaxis. Right sinus exhibits no maxillary sinus tenderness and no frontal sinus tenderness. Left sinus exhibits no maxillary sinus tenderness and no frontal sinus tenderness.   Mouth/Throat: Uvula is midline, oropharynx is clear and moist and mucous membranes are normal. No trismus in the jaw. Normal dentition. No uvula swelling. No oropharyngeal exudate, posterior oropharyngeal edema or posterior oropharyngeal erythema.   Eyes: Conjunctivae and lids are normal. No scleral icterus.   Neck: Trachea normal and phonation normal. Neck supple. No edema present. No erythema present. No neck rigidity present.   Cardiovascular: Normal rate, regular rhythm, normal heart sounds and normal pulses.   Pulmonary/Chest: Effort normal and breath sounds normal. No respiratory distress. She has no decreased breath sounds. She has no rhonchi.   Abdominal: Normal appearance.   Musculoskeletal: Normal range of motion.         General: No deformity. Normal range of motion.   Neurological: She is alert and oriented to person, place, and time. She exhibits normal muscle tone. Coordination normal.   Skin: Skin is warm, dry, intact, not diaphoretic and not pale.   Psychiatric: Her speech is normal and behavior is normal. Judgment and thought content normal.   Nursing note and vitals reviewed.    Assessment:     1. Abrasion of right ear canal, initial encounter        Plan:       Abrasion of right ear canal, initial encounter    Minor Active bleeding in the  right ear canal.  Likely due to cleaning of ear with cotton swab.  Patient advised to monitor.  Follow up with ENT if no improvment or worsening.      Patient Instructions   If no improvement or worsening, proceed to ER  Follow-up with ENT    You must understand that you have received treatment at an Urgent Care facility only, and that you may be  released before all of your medical problems are known or treated. Urgent Care facilities are not equipped to  handle life threatening emergencies. It is recommended that you seek care at an Emergency Department for  further evaluation of worsening or concerning symptoms, or possibly life threatening conditions as  discussed.      If you develop chest pain, shortness of breath, throat swelling, tongue swelling, lightheadedness or any other causes for concern, proceed to ER.

## 2023-05-02 NOTE — TELEPHONE ENCOUNTER
Keppra (brand name only) 2500 mg nightly    Rach Cintron MD PhD [facility]  Neurology-Epilepsy  [facility].

## 2023-05-03 ENCOUNTER — TELEPHONE (OUTPATIENT)
Dept: PHARMACY | Facility: CLINIC | Age: 83
End: 2023-05-03
Payer: MEDICARE

## 2023-05-05 ENCOUNTER — PATIENT MESSAGE (OUTPATIENT)
Dept: INTERNAL MEDICINE | Facility: CLINIC | Age: 83
End: 2023-05-05
Payer: MEDICARE

## 2023-05-09 ENCOUNTER — NURSE TRIAGE (OUTPATIENT)
Dept: ADMINISTRATIVE | Facility: CLINIC | Age: 83
End: 2023-05-09
Payer: MEDICARE

## 2023-05-09 NOTE — TELEPHONE ENCOUNTER
Patient call transferred from [facility] Scheduling due to patient's c/o ear bleeding and dizziness. Patient states she sent a message to her PCP, Dr. Derek Cruz, and is calling for the response from Dr. Cruz due to her inability to access her My Chart file at this time.     Triage RN advised patient that due to her symptoms of dizziness and bleeding from her ear that she would need triage/assessment. Patient declined [facility] On Call Triage service at this time and ended the call.       Additional Information   Negative: Caller has already spoken with the PCP (or office), and has no further questions   Negative: Caller has already spoken with another triager and has no further questions   Negative: Caller has already spoken with another triager or PCP (or office), and has further questions and triager able to answer questions.   Negative: Busy signal.  First attempt to contact caller.  Follow-up call scheduled within 15 minutes.   Negative: No answer.  First attempt to contact caller.  Follow-up call scheduled within 15 minutes.   Negative: Message left on identified voicemail   Negative: Message left on unidentified voice mail. Phone number verified.   Negative: Message left with person in household   Negative: Wrong number reached. Phone number verified.   Negative: Second attempt to contact caller AND no contact made. Phone number verified.   Negative: Third attempt to contact caller AND no contact made. Phone number verified.   Negative: Cell phone out of range. Phone number verified.   Negative: Patient already left for the hospital/clinic   Negative: Caller has cancelled the call before the first contact   Negative: Unable to complete triage due to phone connection issues   Negative: Non-urgent call redirected to specialist's office because it is open    Protocols used: No Contact or Duplicate Contact Call-A-OH

## 2023-05-11 ENCOUNTER — OFFICE VISIT (OUTPATIENT)
Dept: INTERNAL MEDICINE | Facility: CLINIC | Age: 83
End: 2023-05-11
Payer: MEDICARE

## 2023-05-11 VITALS
OXYGEN SATURATION: 98 % | SYSTOLIC BLOOD PRESSURE: 114 MMHG | DIASTOLIC BLOOD PRESSURE: 86 MMHG | HEIGHT: 64 IN | BODY MASS INDEX: 21.34 KG/M2 | HEART RATE: 74 BPM | WEIGHT: 125 LBS

## 2023-05-11 DIAGNOSIS — S00.411A ABRASION OF RIGHT EAR CANAL, INITIAL ENCOUNTER: ICD-10-CM

## 2023-05-11 DIAGNOSIS — H81.10 BENIGN PAROXYSMAL POSITIONAL VERTIGO, UNSPECIFIED LATERALITY: Primary | ICD-10-CM

## 2023-05-11 DIAGNOSIS — G40.309 NONINTRACTABLE GENERALIZED IDIOPATHIC EPILEPSY WITHOUT STATUS EPILEPTICUS: ICD-10-CM

## 2023-05-11 PROCEDURE — 99999 PR PBB SHADOW E&M-EST. PATIENT-LVL III: ICD-10-PCS | Mod: PBBFAC,,, | Performed by: INTERNAL MEDICINE

## 2023-05-11 PROCEDURE — 99213 OFFICE O/P EST LOW 20 MIN: CPT | Mod: PBBFAC | Performed by: INTERNAL MEDICINE

## 2023-05-11 PROCEDURE — 99999 PR PBB SHADOW E&M-EST. PATIENT-LVL III: CPT | Mod: PBBFAC,,, | Performed by: INTERNAL MEDICINE

## 2023-05-11 PROCEDURE — 99214 OFFICE O/P EST MOD 30 MIN: CPT | Mod: S$PBB,,, | Performed by: INTERNAL MEDICINE

## 2023-05-11 PROCEDURE — 99214 PR OFFICE/OUTPT VISIT, EST, LEVL IV, 30-39 MIN: ICD-10-PCS | Mod: S$PBB,,, | Performed by: INTERNAL MEDICINE

## 2023-05-11 RX ORDER — AMOXICILLIN AND CLAVULANATE POTASSIUM 500; 125 MG/1; MG/1
1 TABLET, FILM COATED ORAL 3 TIMES DAILY
COMMUNITY
End: 2023-08-23

## 2023-05-11 NOTE — PROGRESS NOTES
"    CHIEF COMPLAINT     Chief Complaint   Patient presents with    Dizziness       HPI     Gabriela Edwards is a 82 y.o. female here today for     Reports last weekend had episode of BPPV.  Reports she bent over to  a paper Sunday morning and felt really dizzy.  Went to bed Saturday night feeling well.  Reports that going from bending over standing back up the room would spin for less than a minute.  Reports that after while symptoms subside on their own.  Imaging episodes she feels completely normal.  Has not had any episodes today so far yesterday.    Personally Reviewed Patient's Medical, surgical, family and social hx. Changes updated in [facility].  Care Team updated in Epic    Review of Systems:  Review of Systems    Health Maintenance:   Reviewed with patient  Due for the following:      PHYSICAL EXAM     /86 (BP Location: Left arm, Patient Position: Sitting, BP Method: Small (Manual))   Pulse 74   Ht 5' 4" (1.626 m)   Wt 56.7 kg (125 lb)   SpO2 98%   BMI 21.46 kg/m²     Gen: Well Appearing, NAD  HEENT: PERR, EOMI  Neck: FROM, no thyromegaly, no cervical adenopathy  CVD: RRR, no M/R/G  Pulm: Normal work of breathing, CTAB, no wheezing  Abd:  Soft, NT, ND non TTP, no mass  MSK: no LE edema  Neuro: A&Ox3, gait normal, speech normal  Mood; Mood normal, behavior normal, thought process linear       LABS     Labs reviewed; Notable for    ASSESSMENT     1. Benign paroxysmal positional vertigo, unspecified laterality        2. Abrasion of right ear canal, initial encounter        3. Nonintractable generalized idiopathic epilepsy without status epilepticus                Plan     Gabriela Edwards is a 82 y.o. female with  1. Benign paroxysmal positional vertigo, unspecified laterality  Improved today, improved over the last 36 hours  Recommend Fu Daroff exercises and p.r.n. meclizine if symptoms return  If return consent vertigo therapy at [facility]  2. Abrasion of right ear canal, initial " encounter  Reassurance given    3. Epilepsy  No sz continue current therapy plan     Derek Cruz MD

## 2023-05-16 ENCOUNTER — SPECIALTY PHARMACY (OUTPATIENT)
Dept: PHARMACY | Facility: CLINIC | Age: 83
End: 2023-05-16
Payer: MEDICARE

## 2023-05-16 NOTE — TELEPHONE ENCOUNTER
Specialty Pharmacy - Refill Coordination    Specialty Medication Orders Linked to Encounter      Flowsheet Row Most Recent Value   Medication #1 dupilumab (DUPIXENT SYRINGE) 300 mg/2 mL Syrg (Order#[id number], Rx#[id number])            Refill Questions - Documented Responses      Flowsheet Row Most Recent Value   Patient Availability and HIPAA Verification    Does patient want to proceed with activity? Yes   HIPAA/medical authority confirmed? Yes   Relationship to patient of person spoken to? Self   Refill Screening Questions    Changes to allergies? No   Changes to medications? No   New conditions since last clinic visit? No   Unplanned office visit, urgent care, ED, or hospital admission in the last 4 weeks? No   How does patient/caregiver feel medication is working? Good   Financial problems or insurance changes? No   How many doses of your specialty medications were missed in the last 4 weeks? 0   Would patient like to speak to a pharmacist? No   When does the patient need to receive the medication? 05/24/23   Refill Delivery Questions    How will the patient receive the medication? MEDRx   When does the patient need to receive the medication? 05/24/23   Shipping Address Home   Address in [facility] confirmed and updated if neccessary? Yes   Expected Copay ($) 80   Is the patient able to afford the medication copay? Yes   Payment Method CC on file   Days supply of Refill 28   Supplies needed? No supplies needed   Refill activity completed? Yes   Refill activity plan Refill scheduled   Shipment/Pickup Date: 05/18/23            Current Outpatient Medications   Medication Sig    amoxicillin-clavulanate 500-125mg (AUGMENTIN) 500-125 mg Tab Take 1 tablet by mouth 3 (three) times daily.    atovaquone-proguaniL (MALARONE) 250-100 mg Tab Take one tablet daily for malaria prevention. Begin one day before entering malarious area and continue for 1 week after return.    dupilumab (DUPIXENT SYRINGE) 300 mg/2 mL Syrg  Inject 2 mLs (1 syringe) into the skin every 14 (fourteen) days.    KEPPRA  mg Tb24 24 hr tablet Take 5 tablets (2,500 mg total) by mouth every evening.    levocetirizine (XYZAL) 5 MG tablet Take 10 mg by mouth 2 (two) times daily.    olopatadine (PATANOL) 0.1 % ophthalmic solution INSTILL 1 DROP IN BOTH EYES TWICE DAILY AS NEEDED    ondansetron (ZOFRAN) 4 MG tablet Take 1 tablet (4 mg total) by mouth daily as needed for Nausea.    tacrolimus (PROTOPIC) 0.1 % ointment Apply topically 2 (two) times daily. As needed for exacerbations of eyelid eczema    triamcinolone acetonide 0.1% (KENALOG) 0.1 % cream Apply topically 2 (two) times daily. Apply to affected areas as needed twice daily. Avoid use on face.   Last reviewed on 5/11/2023  9:14 AM by Gabriela Monson MA    Review of patient's allergies indicates:   Allergen Reactions    House dust mite Hives, Itching, Rash and Swelling    Last reviewed on  5/11/2023 9:11 AM by Gabriela Monson      Tasks added this encounter   No tasks added.   Tasks due within next 3 months   5/16/2023 - Refill Coordination Outreach (1 time occurrence)     [facility]  [address] [place] [address]  Phone: [phone]  Fax: [phone]

## 2023-05-29 ENCOUNTER — PATIENT MESSAGE (OUTPATIENT)
Dept: OPTOMETRY | Facility: CLINIC | Age: 83
End: 2023-05-29
Payer: MEDICARE

## 2023-05-29 ENCOUNTER — TELEPHONE (OUTPATIENT)
Dept: OPHTHALMOLOGY | Facility: CLINIC | Age: 83
End: 2023-05-29
Payer: MEDICARE

## 2023-05-29 NOTE — TELEPHONE ENCOUNTER
----- Message from Christopher Braun sent at 5/29/2023 11:07 AM CDT -----  Contact: [phone]  Pt is calling because she was trying to mikhail a refill on her marcy poly dex ophthalmic ointment 3.5 gm. She got this message.    Good Morning Dr. Rose Marie Evangelista is no longer at [facility]. You can call the clinic at [phone] to be speak to our triage technician to get a refill on your ointment by another provider.     TANYA De La Torre      Please call back to further assist.

## 2023-05-30 ENCOUNTER — PATIENT MESSAGE (OUTPATIENT)
Dept: OPTOMETRY | Facility: CLINIC | Age: 83
End: 2023-05-30
Payer: MEDICARE

## 2023-06-01 ENCOUNTER — OFFICE VISIT (OUTPATIENT)
Dept: OPTOMETRY | Facility: CLINIC | Age: 83
End: 2023-06-01
Payer: MEDICARE

## 2023-06-01 DIAGNOSIS — L23.9 ALLERGIC CONTACT DERMATITIS, UNSPECIFIED TRIGGER: Primary | ICD-10-CM

## 2023-06-01 PROCEDURE — 99212 OFFICE O/P EST SF 10 MIN: CPT | Mod: PBBFAC | Performed by: OPTOMETRIST

## 2023-06-01 PROCEDURE — 99999 PR PBB SHADOW E&M-EST. PATIENT-LVL II: CPT | Mod: PBBFAC,,, | Performed by: OPTOMETRIST

## 2023-06-01 PROCEDURE — 92012 PR EYE EXAM, EST PATIENT,INTERMED: ICD-10-PCS | Mod: S$PBB,,, | Performed by: OPTOMETRIST

## 2023-06-01 PROCEDURE — 99999 PR PBB SHADOW E&M-EST. PATIENT-LVL II: ICD-10-PCS | Mod: PBBFAC,,, | Performed by: OPTOMETRIST

## 2023-06-01 PROCEDURE — 92012 INTRM OPH EXAM EST PATIENT: CPT | Mod: S$PBB,,, | Performed by: OPTOMETRIST

## 2023-06-01 RX ORDER — PENCICLOVIR 10 MG/G
CREAM TOPICAL
COMMUNITY
Start: 2023-05-25 | End: 2023-08-23

## 2023-06-01 NOTE — PROGRESS NOTES
HPI    Pt is here today for concerns about ocular health. Pt states that she   experiences constant itching OD>OS. States that OD will swell often due to   irritation. States that she gets eczema on upper eye lids and that it is   worse on the lining of her eye lashes. Pt states that the reason that her   blood vessel popped OD is due to her always rubbing eyes constantly when   it itches.   DLS: 4/3/2023 Dr. Trotter  (-)Flashes (-)Floaters (-)Diplopia (-)Headaches   (+)Itching OD>OS (+)Tearing constant (+)Burning (+)Dryness (-)Photophobia  (-)Glare  Past Eye Sx: Cataracts with IOL OU   Eye Meds: Olopatadine OU (whichever eye is causing irritation at the time)   PRN                      Adam/Poly/Dex Ointment OU PRN  Last edited by Pilar Larkin, OD on 6/1/2023  2:35 PM.            Assessment /Plan     For exam results, see Encounter Report.    Allergic contact dermatitis, unspecified trigger  -     tobramycin-dexAMETHasone 0.3-0.1% (TOBRADEX) 0.3-0.1 % Oint; Place into both eyes every evening. Place a 1/2 inch ribbon of ointment into the lower eyelid.  Dispense: 3.5 g; Refill: 0      Pt presents today with subconjunctival hemorrhage OD which she believes was inflicted when she rubbed the OD too much during her last eczema flare up. Pt is no longer experiencing flare up however she mentioned that she does not think the Maxitrol ginger helps during flare ups in fact it tends to make her eyelids itch more. Pt instructed to D/C Maxitrol ginger for flare ups an instead use Rx Tobradex gigner. Pt to provide feedback on effectiveness of new ginger therapy if/when flare up recurs.    Prev. Dr. Trotter note: Allergies followed by Dr. Kahn--treated with Dupilumab.  Continue with Patanol as needed during eye flare-ups.    RTC PRN

## 2023-06-05 ENCOUNTER — PES CALL (OUTPATIENT)
Dept: ADMINISTRATIVE | Facility: CLINIC | Age: 83
End: 2023-06-05
Payer: MEDICARE

## 2023-06-08 ENCOUNTER — SPECIALTY PHARMACY (OUTPATIENT)
Dept: PHARMACY | Facility: CLINIC | Age: 83
End: 2023-06-08
Payer: MEDICARE

## 2023-06-08 NOTE — TELEPHONE ENCOUNTER
Outgoing call regarding Dupixent refill. PT stated she is due to inject on 6/21/23. PT stated we can follow up with her on 6/14/23.

## 2023-06-14 NOTE — TELEPHONE ENCOUNTER
Specialty Pharmacy - Refill Coordination    Specialty Medication Orders Linked to Encounter      Flowsheet Row Most Recent Value   Medication #1 dupilumab (DUPIXENT SYRINGE) 300 mg/2 mL Syrg (Order#[id number], Rx#[id number])            Refill Questions - Documented Responses      Flowsheet Row Most Recent Value   Patient Availability and HIPAA Verification    Does patient want to proceed with activity? Yes   HIPAA/medical authority confirmed? Yes   Relationship to patient of person spoken to? Self   Refill Screening Questions    Changes to allergies? No   Changes to medications? No   New conditions since last clinic visit? No   Unplanned office visit, urgent care, ED, or hospital admission in the last 4 weeks? No   How does patient/caregiver feel medication is working? Good   Financial problems or insurance changes? No   How many doses of your specialty medications were missed in the last 4 weeks? 0   Would patient like to speak to a pharmacist? No   When does the patient need to receive the medication? 06/21/23   Refill Delivery Questions    How will the patient receive the medication? MEDRx   When does the patient need to receive the medication? 06/21/23   Shipping Address Home   Address in [facility] confirmed and updated if neccessary? Yes   Expected Copay ($) 80   Is the patient able to afford the medication copay? Yes   Payment Method CC on file   Days supply of Refill 28   Supplies needed? No supplies needed   Refill activity completed? Yes   Refill activity plan Refill scheduled   Shipment/Pickup Date: 06/19/23            Current Outpatient Medications   Medication Sig    amoxicillin-clavulanate 500-125mg (AUGMENTIN) 500-125 mg Tab Take 1 tablet by mouth 3 (three) times daily.    atovaquone-proguaniL (MALARONE) 250-100 mg Tab Take one tablet daily for malaria prevention. Begin one day before entering malarious area and continue for 1 week after return.    dupilumab (DUPIXENT SYRINGE) 300 mg/2 mL Syrg  Inject 2 mLs (1 syringe) into the skin every 14 (fourteen) days.    KEPPRA  mg Tb24 24 hr tablet Take 5 tablets (2,500 mg total) by mouth every evening.    levocetirizine (XYZAL) 5 MG tablet Take 10 mg by mouth 2 (two) times daily.    olopatadine (PATANOL) 0.1 % ophthalmic solution INSTILL 1 DROP IN BOTH EYES TWICE DAILY AS NEEDED    ondansetron (ZOFRAN) 4 MG tablet Take 1 tablet (4 mg total) by mouth daily as needed for Nausea.    penciclovir (DENAVIR) 1 % cream SMARTSIG:Topical Every 2 Hours    tacrolimus (PROTOPIC) 0.1 % ointment Apply topically 2 (two) times daily. As needed for exacerbations of eyelid eczema    tobramycin-dexAMETHasone 0.3-0.1% (TOBRADEX) 0.3-0.1 % Oint Place into both eyes every evening. Place a 1/2 inch ribbon of ointment into the lower eyelid.    triamcinolone acetonide 0.1% (KENALOG) 0.1 % cream Apply topically 2 (two) times daily. Apply to affected areas as needed twice daily. Avoid use on face.   Last reviewed on 6/1/2023  2:14 PM by Lucie Sanderson    Review of patient's allergies indicates:   Allergen Reactions    House dust mite Hives, Itching, Rash and Swelling    Last reviewed on  6/1/2023 2:14 PM by Lucie Sanderson      Tasks added this encounter   No tasks added.   Tasks due within next 3 months   6/14/2023 - Refill Coordination Outreach (1 time occurrence)     Dinora Lewis [facility]  [address] [place] [id number]  Phone: [phone]  Fax: [phone]

## 2023-06-21 ENCOUNTER — PATIENT MESSAGE (OUTPATIENT)
Dept: OPTOMETRY | Facility: CLINIC | Age: 83
End: 2023-06-21
Payer: MEDICARE

## 2023-07-06 DIAGNOSIS — L50.1 CHRONIC IDIOPATHIC URTICARIA: ICD-10-CM

## 2023-07-06 DIAGNOSIS — L50.1 CHRONIC IDIOPATHIC URTICARIA: Primary | ICD-10-CM

## 2023-07-06 RX ORDER — LEVOCETIRIZINE DIHYDROCHLORIDE 5 MG/1
10 TABLET, FILM COATED ORAL 2 TIMES DAILY
Qty: 120 TABLET | Refills: 6 | Status: ACTIVE | OUTPATIENT
Start: 2023-07-06 | End: 2023-08-23

## 2023-07-06 RX ORDER — LEVOCETIRIZINE DIHYDROCHLORIDE 5 MG/1
10 TABLET, FILM COATED ORAL 2 TIMES DAILY
Qty: 120 TABLET | Refills: 6 | OUTPATIENT
Start: 2023-07-06

## 2023-07-10 ENCOUNTER — SPECIALTY PHARMACY (OUTPATIENT)
Dept: PHARMACY | Facility: CLINIC | Age: 83
End: 2023-07-10
Payer: MEDICARE

## 2023-07-10 DIAGNOSIS — L20.9 ATOPIC DERMATITIS, UNSPECIFIED TYPE: ICD-10-CM

## 2023-07-10 RX ORDER — DUPILUMAB 300 MG/2ML
300 INJECTION, SOLUTION SUBCUTANEOUS
Qty: 4 ML | Refills: 11 | Status: ACTIVE | OUTPATIENT
Start: 2023-07-10

## 2023-07-10 NOTE — TELEPHONE ENCOUNTER
Good afternoon ,    LOV 02/02/0223.    Kindly find attached for your attention.    Kind regards  Mariusz Sevilla MA

## 2023-07-10 NOTE — TELEPHONE ENCOUNTER
Outgoing call regarding refill Dupixent. Faxed [facility] for refills. Pt next injection will be 7/19, will follow up once approve.

## 2023-07-11 ENCOUNTER — PATIENT MESSAGE (OUTPATIENT)
Dept: ALLERGY | Facility: CLINIC | Age: 83
End: 2023-07-11
Payer: MEDICARE

## 2023-07-11 NOTE — TELEPHONE ENCOUNTER
Specialty Pharmacy - Refill Coordination    Specialty Medication Orders Linked to Encounter      Flowsheet Row Most Recent Value   Medication #1 dupilumab (DUPIXENT SYRINGE) 300 mg/2 mL Syrg (Order#[id number], Rx#[id number])            Refill Questions - Documented Responses      Flowsheet Row Most Recent Value   Patient Availability and HIPAA Verification    Does patient want to proceed with activity? Yes   HIPAA/medical authority confirmed? Yes   Relationship to patient of person spoken to? Self   Refill Screening Questions    Changes to allergies? No   Changes to medications? No   New conditions since last clinic visit? No   Unplanned office visit, urgent care, ED, or hospital admission in the last 4 weeks? No   How does patient/caregiver feel medication is working? Excellent   Financial problems or insurance changes? No   How many doses of your specialty medications were missed in the last 4 weeks? 0   Would patient like to speak to a pharmacist? No   When does the patient need to receive the medication? 07/19/23   Refill Delivery Questions    How will the patient receive the medication? MEDRx   When does the patient need to receive the medication? 07/19/23   Shipping Address Home   Address in [facility] [place] [facility] confirmed and updated if neccessary? Yes   Expected Copay ($) 80   Is the patient able to afford the medication copay? Yes   Payment Method CC on file   Days supply of Refill 28   Supplies needed? No supplies needed   Refill activity completed? Yes   Refill activity plan Refill scheduled   Shipment/Pickup Date: 07/17/23            Current Outpatient Medications   Medication Sig    amoxicillin-clavulanate 500-125mg (AUGMENTIN) 500-125 mg Tab Take 1 tablet by mouth 3 (three) times daily.    atovaquone-proguaniL (MALARONE) 250-100 mg Tab Take one tablet daily for malaria prevention. Begin one day before entering malarious area and continue for 1 week after return.    dupilumab (DUPIXENT SYRINGE) 300 mg/2 mL  Syrg Inject 2 mLs (1 syringe) into the skin every 14 (fourteen) days.    KEPPRA  mg Tb24 24 hr tablet Take 5 tablets (2,500 mg total) by mouth every evening.    levocetirizine (XYZAL) 5 MG tablet Take 2 tablets (10 mg total) by mouth 2 (two) times daily. For chronic urticaria    olopatadine (PATANOL) 0.1 % ophthalmic solution INSTILL 1 DROP IN BOTH EYES TWICE DAILY AS NEEDED    ondansetron (ZOFRAN) 4 MG tablet Take 1 tablet (4 mg total) by mouth daily as needed for Nausea.    penciclovir (DENAVIR) 1 % cream SMARTSIG:Topical Every 2 Hours    tacrolimus (PROTOPIC) 0.1 % ointment Apply topically 2 (two) times daily. As needed for exacerbations of eyelid eczema    triamcinolone acetonide 0.1% (KENALOG) 0.1 % cream Apply topically 2 (two) times daily. Apply to affected areas as needed twice daily. Avoid use on face.   Last reviewed on 6/1/2023  2:14 PM by Lucie Sanderson    Review of patient's allergies indicates:   Allergen Reactions    House dust mite Hives, Itching, Rash and Swelling    Last reviewed on  6/1/2023 2:14 PM by Lucie Sanderson      Tasks added this encounter   No tasks added.   Tasks due within next 3 months   No tasks due.     Iona Goodwin, PharmD  [facility]  [address] [place] [id number]  Phone: [phone]  Fax: [phone]

## 2023-08-07 ENCOUNTER — SPECIALTY PHARMACY (OUTPATIENT)
Dept: PHARMACY | Facility: CLINIC | Age: 83
End: 2023-08-07
Payer: MEDICARE

## 2023-08-07 NOTE — TELEPHONE ENCOUNTER
Specialty Pharmacy - Refill Coordination    Specialty Medication Orders Linked to Encounter      Flowsheet Row Most Recent Value   Medication #1 dupilumab (DUPIXENT SYRINGE) 300 mg/2 mL Syrg (Order#[id number], Rx#[id number])            Refill Questions - Documented Responses      Flowsheet Row Most Recent Value   Patient Availability and HIPAA Verification    Does patient want to proceed with activity? Yes   HIPAA/medical authority confirmed? Yes   Relationship to patient of person spoken to? Self   Refill Screening Questions    Changes to allergies? No   Changes to medications? No   New conditions since last clinic visit? No   Unplanned office visit, urgent care, ED, or hospital admission in the last 4 weeks? No   How does patient/caregiver feel medication is working? Excellent   Financial problems or insurance changes? No   How many doses of your specialty medications were missed in the last 4 weeks? 0   Would patient like to speak to a pharmacist? No   When does the patient need to receive the medication? 08/16/23   Refill Delivery Questions    How will the patient receive the medication? MEDRx   When does the patient need to receive the medication? 08/16/23   Shipping Address Home   Address in [facility] confirmed and updated if neccessary? Yes   Expected Copay ($) 80   Is the patient able to afford the medication copay? Yes   Payment Method CC on file   Days supply of Refill 28   Supplies needed? No supplies needed   Refill activity completed? Yes   Refill activity plan Refill scheduled   Shipment/Pickup Date: 08/14/23            Current Outpatient Medications   Medication Sig    amoxicillin-clavulanate 500-125mg (AUGMENTIN) 500-125 mg Tab Take 1 tablet by mouth 3 (three) times daily.    atovaquone-proguaniL (MALARONE) 250-100 mg Tab Take one tablet daily for malaria prevention. Begin one day before entering malarious area and continue for 1 week after return.    dupilumab (DUPIXENT SYRINGE) 300 mg/2 mL  Syrg Inject 2 mLs (1 syringe) into the skin every 14 (fourteen) days.    KEPPRA  mg Tb24 24 hr tablet Take 5 tablets (2,500 mg total) by mouth every evening.    levocetirizine (XYZAL) 5 MG tablet Take 2 tablets (10 mg total) by mouth 2 (two) times daily. For chronic urticaria    olopatadine (PATANOL) 0.1 % ophthalmic solution INSTILL 1 DROP IN BOTH EYES TWICE DAILY AS NEEDED    ondansetron (ZOFRAN) 4 MG tablet Take 1 tablet (4 mg total) by mouth daily as needed for Nausea.    penciclovir (DENAVIR) 1 % cream SMARTSIG:Topical Every 2 Hours    tacrolimus (PROTOPIC) 0.1 % ointment Apply topically 2 (two) times daily. As needed for exacerbations of eyelid eczema    triamcinolone acetonide 0.1% (KENALOG) 0.1 % cream Apply topically 2 (two) times daily. Apply to affected areas as needed twice daily. Avoid use on face.   Last reviewed on 6/1/2023  2:14 PM by Lucie Sanderson    Review of patient's allergies indicates:   Allergen Reactions    House dust mite Hives, Itching, Rash and Swelling    Last reviewed on  6/1/2023 2:14 PM by Lucie Sanderson      Tasks added this encounter   No tasks added.   Tasks due within next 3 months   8/7/2023 - Refill Coordination Outreach (1 time occurrence)     Tyson Lewis [facility]  [address] [place] [id number]  Phone: [phone]  Fax: [phone]

## 2023-08-16 ENCOUNTER — OFFICE VISIT (OUTPATIENT)
Dept: INFECTIOUS DISEASES | Facility: CLINIC | Age: 83
End: 2023-08-16
Payer: MEDICARE

## 2023-08-16 VITALS
HEART RATE: 70 BPM | WEIGHT: 127.63 LBS | BODY MASS INDEX: 21.91 KG/M2 | DIASTOLIC BLOOD PRESSURE: 69 MMHG | TEMPERATURE: 98 F | SYSTOLIC BLOOD PRESSURE: 132 MMHG

## 2023-08-16 DIAGNOSIS — Z71.84 TRAVEL ADVICE ENCOUNTER: Primary | ICD-10-CM

## 2023-08-16 PROCEDURE — 99402 PR PREVENT COUNSEL,INDIV,30 MIN: ICD-10-PCS | Mod: S$PBB,,, | Performed by: INTERNAL MEDICINE

## 2023-08-16 PROCEDURE — 99402 PREV MED CNSL INDIV APPRX 30: CPT | Mod: S$PBB,,, | Performed by: INTERNAL MEDICINE

## 2023-08-16 PROCEDURE — 99999 PR PBB SHADOW E&M-EST. PATIENT-LVL III: ICD-10-PCS | Mod: PBBFAC,,, | Performed by: INTERNAL MEDICINE

## 2023-08-16 PROCEDURE — 99213 OFFICE O/P EST LOW 20 MIN: CPT | Mod: PBBFAC | Performed by: INTERNAL MEDICINE

## 2023-08-16 PROCEDURE — 99999 PR PBB SHADOW E&M-EST. PATIENT-LVL III: CPT | Mod: PBBFAC,,, | Performed by: INTERNAL MEDICINE

## 2023-08-18 ENCOUNTER — CLINICAL SUPPORT (OUTPATIENT)
Dept: INFECTIOUS DISEASES | Facility: CLINIC | Age: 83
End: 2023-08-18
Payer: MEDICARE

## 2023-08-18 DIAGNOSIS — Z71.85 VACCINE COUNSELING: Primary | ICD-10-CM

## 2023-08-18 PROCEDURE — 99999PBSHW HEPATITIS A VACCINE ADULT IM: ICD-10-PCS | Mod: PBBFAC,,,

## 2023-08-18 PROCEDURE — 90471 IMMUNIZATION ADMIN: CPT | Mod: PBBFAC

## 2023-08-18 PROCEDURE — 99999PBSHW HEPATITIS A VACCINE ADULT IM: Mod: PBBFAC,,,

## 2023-08-18 PROCEDURE — 90632 HEPA VACCINE ADULT IM: CPT | Mod: PBBFAC

## 2023-08-18 NOTE — PROGRESS NOTES
Patient received  Hep A #2 vaccine in the left deltoid. Pt tolerated well. Pt asked to wait in the clinic 15 minutes after injection in the event of an allergic reaction. Pt verbalized understanding. Pt left in NAD.

## 2023-08-23 ENCOUNTER — LAB VISIT (OUTPATIENT)
Dept: LAB | Facility: HOSPITAL | Age: 83
End: 2023-08-23
Payer: MEDICARE

## 2023-08-23 ENCOUNTER — OFFICE VISIT (OUTPATIENT)
Dept: NEUROLOGY | Facility: CLINIC | Age: 83
End: 2023-08-23
Payer: MEDICARE

## 2023-08-23 VITALS
BODY MASS INDEX: 21.56 KG/M2 | HEIGHT: 64 IN | SYSTOLIC BLOOD PRESSURE: 114 MMHG | HEART RATE: 62 BPM | DIASTOLIC BLOOD PRESSURE: 68 MMHG | WEIGHT: 126.31 LBS

## 2023-08-23 DIAGNOSIS — M54.2 CERVICALGIA: ICD-10-CM

## 2023-08-23 DIAGNOSIS — G40.309 NONINTRACTABLE GENERALIZED IDIOPATHIC EPILEPSY WITHOUT STATUS EPILEPTICUS: ICD-10-CM

## 2023-08-23 DIAGNOSIS — H81.10 BENIGN PAROXYSMAL POSITIONAL VERTIGO, UNSPECIFIED LATERALITY: ICD-10-CM

## 2023-08-23 DIAGNOSIS — G40.309 NONINTRACTABLE GENERALIZED IDIOPATHIC EPILEPSY WITHOUT STATUS EPILEPTICUS: Primary | ICD-10-CM

## 2023-08-23 LAB
ALBUMIN SERPL BCP-MCNC: 4.3 G/DL (ref 3.5–5.2)
ALP SERPL-CCNC: 49 U/L (ref 55–135)
ALT SERPL W/O P-5'-P-CCNC: 13 U/L (ref 10–44)
ANION GAP SERPL CALC-SCNC: 6 MMOL/L (ref 8–16)
AST SERPL-CCNC: 21 U/L (ref 10–40)
BASOPHILS # BLD AUTO: 0.04 K/UL (ref 0–0.2)
BASOPHILS NFR BLD: 0.8 % (ref 0–1.9)
BILIRUB SERPL-MCNC: 0.6 MG/DL (ref 0.1–1)
BUN SERPL-MCNC: 12 MG/DL (ref 8–23)
CALCIUM SERPL-MCNC: 10.3 MG/DL (ref 8.7–10.5)
CHLORIDE SERPL-SCNC: 104 MMOL/L (ref 95–110)
CO2 SERPL-SCNC: 29 MMOL/L (ref 23–29)
CREAT SERPL-MCNC: 0.7 MG/DL (ref 0.5–1.4)
DIFFERENTIAL METHOD: ABNORMAL
EOSINOPHIL # BLD AUTO: 0.5 K/UL (ref 0–0.5)
EOSINOPHIL NFR BLD: 10.4 % (ref 0–8)
ERYTHROCYTE [DISTWIDTH] IN BLOOD BY AUTOMATED COUNT: 13 % (ref 11.5–14.5)
EST. GFR  (NO RACE VARIABLE): >60 ML/MIN/1.73 M^2
GLUCOSE SERPL-MCNC: 95 MG/DL (ref 70–110)
HCT VFR BLD AUTO: 40.9 % (ref 37–48.5)
HGB BLD-MCNC: 13.1 G/DL (ref 12–16)
IMM GRANULOCYTES # BLD AUTO: 0.01 K/UL (ref 0–0.04)
IMM GRANULOCYTES NFR BLD AUTO: 0.2 % (ref 0–0.5)
LYMPHOCYTES # BLD AUTO: 1.9 K/UL (ref 1–4.8)
LYMPHOCYTES NFR BLD: 36 % (ref 18–48)
MCH RBC QN AUTO: 28.9 PG (ref 27–31)
MCHC RBC AUTO-ENTMCNC: 32 G/DL (ref 32–36)
MCV RBC AUTO: 90 FL (ref 82–98)
MONOCYTES # BLD AUTO: 0.5 K/UL (ref 0.3–1)
MONOCYTES NFR BLD: 9.3 % (ref 4–15)
NEUTROPHILS # BLD AUTO: 2.2 K/UL (ref 1.8–7.7)
NEUTROPHILS NFR BLD: 43.3 % (ref 38–73)
NRBC BLD-RTO: 0 /100 WBC
PLATELET # BLD AUTO: 206 K/UL (ref 150–450)
PMV BLD AUTO: 9.8 FL (ref 9.2–12.9)
POTASSIUM SERPL-SCNC: 5.6 MMOL/L (ref 3.5–5.1)
PROT SERPL-MCNC: 7.8 G/DL (ref 6–8.4)
RBC # BLD AUTO: 4.54 M/UL (ref 4–5.4)
SODIUM SERPL-SCNC: 139 MMOL/L (ref 136–145)
WBC # BLD AUTO: 5.17 K/UL (ref 3.9–12.7)

## 2023-08-23 PROCEDURE — 99999 PR PBB SHADOW E&M-EST. PATIENT-LVL III: CPT | Mod: PBBFAC,,,

## 2023-08-23 PROCEDURE — 80053 COMPREHEN METABOLIC PANEL: CPT

## 2023-08-23 PROCEDURE — 99215 PR OFFICE/OUTPT VISIT, EST, LEVL V, 40-54 MIN: ICD-10-PCS | Mod: S$PBB,,,

## 2023-08-23 PROCEDURE — 99213 OFFICE O/P EST LOW 20 MIN: CPT | Mod: PBBFAC

## 2023-08-23 PROCEDURE — 36415 COLL VENOUS BLD VENIPUNCTURE: CPT

## 2023-08-23 PROCEDURE — 85025 COMPLETE CBC W/AUTO DIFF WBC: CPT

## 2023-08-23 PROCEDURE — 80177 DRUG SCRN QUAN LEVETIRACETAM: CPT

## 2023-08-23 PROCEDURE — 99999 PR PBB SHADOW E&M-EST. PATIENT-LVL III: ICD-10-PCS | Mod: PBBFAC,,,

## 2023-08-23 PROCEDURE — 99215 OFFICE O/P EST HI 40 MIN: CPT | Mod: S$PBB,,,

## 2023-08-23 NOTE — PROGRESS NOTES
Name: Gabriela Edwards  MRN:[id number]   CSN: [id number]  Date of service: 2023  Age:83 y.o.   Gender:female   Referring Physician/Service: No referring provider defined for this encounter.   The patient is here today with: self    Neurology Clinic: Follow-up Visit    CHIEF COMPLAINT:  Epilepsy    Interval Events/ROS 2023:    Current ASM/SEs: Keppra (brand name only) extended release 500 mg tablets -> 2500ER nightly SE no issues  Breakthrough seizures/events: none  Driving: yes  Sleep: good  Mood: good    6-8 months ago started experiencing episodes of vertigo that last 2 minutes or less, aggravated by certain position changes. Tried treating w/ head movement maneuvers she found online with great success, has not experienced vertigo for the past week. Also reports daily head pressure sensation - towards the end of every day her head begins to feel very heavy and she will feel pressure to base of head that is relieved by laying down and applying support/pressure to base of head/back of neck. Otherwise, no fever, no cold symptoms, no changes in vision, no new weakness, no chest pain, no shortness of breath, no nausea, no vomiting, no diarrhea, no constipation, no tingling/numbness, no problems walking.    Recent Labs   Lab 22  1402   Levetiracetam Lvl 37.7     HPI 2022:     Age of first seizure: 2002, 65yo   Handedness: right   Seizure Risk Factors: maternal uncle with seizures (details unclear), no head strike with LOC, no CNS infections,  term with no prolonged hospitalization   Time of Last Seizure:    # of lifetime Seizures: 1 GTC, 8 other behavior arrest seizures   Frequency of Seizures: at most 1 in a day, every couple of years   Seizure Triggers: no idea   Injuries/Hospitalization for seizures? No injury, ED -> admitted after the GTC ()   Driving? Yes   Bone Health: Osteoporosis, previously on treatment with Fosamax  Mood: excellent      Auras: no warning, out of the blue  "    Seizure Events:   1. Generalized convulsions x1, foaming at the mouth, no urinary incotinence, no tongue bite, postictal confusion for about an hour   2. Staring forward, loss of awareness, 10s    Current AED/SEs:  1. Keppra (brand name only) extended release 500 mg tablets -> 2500ER nightly SE no issues     Previous AED/SEs or reason for DC.   Generic Keppra -> had another staring episode, needs brand name only medication     EEG: yes in the past, with abnormalities, but no reports or tracing jasealkatty for review   MRI: for hearing loss, IAC after a mass was removed from her ear, no abnormalities     Other Allergies:  Dust mites     AED compliance, adherence: no missed doses     ROS 8/30/2022:  Previously followed by Dr. Patel for epilepsy.  Allergies, under great control with dupixent.  Occasion bloating. Joint pain. Otherwise, denies headache, loss of vision, blurred vision, diplopia, dysarthria, dysphagia, lightheadedness, vertigo, tinnitus or hearing difficulty. Denies difficulties producing or comprehending speech.  Denies focal weakness, numbness, paresthesias. Denies difficulty with gait. Denies cough, shortness of breath.  Denies chest pain or tightness, palpitations.  Denies nausea, vomiting, diarrhea, constipation or abdominal pain.  No falls.       EXAM:   - Vitals: /68   Pulse 62   Ht 5' 4" (1.626 m)   Wt 57.3 kg (126 lb 5.2 oz)   BMI 21.68 kg/m²    - General: Awake, cooperative, NAD.  - HEENT: NC/AT  - Neck:  Decreased range of motion  - Pulmonary: no increased WOB  - Cardiac: well perfused   - Abdomen: soft, nontender, nondistended  - Extremities: no edema  - Skin: no rashes or lesions noted.     NEURO EXAM:   - Mental Status: Awake, alert, oriented x 3. Able to relate history without difficulty. Attentive to examiner. Language is fluent with intact repetition and comprehension. Normal prosody. There were no paraphasic errors. Able to name both high and low frequency objects. Speech was " not dysarthric. Able to follow both midline and appendicular commands. There was no evidence of apraxia or neglect.    - Cranial Nerves:  VFF. EOMI. No facial droop. Hearing intact to finger-rub bilaterally. 5/5 strength in trapezii and SCM bilaterally. Tongue protrudes in midline and to either side with no evidence of atrophy or weakness.    - Motor: Normal bulk and tone throughout. No pronator drift bilaterally. No adventitious movements such as tremor or asterixis noted.  Exam pain limited in right shoulder/arm    Delt Bic Tri WrE WrF  FFl FE IO IP Quad Ham TA Gastroc  R   5     5    5    5    5        5   4    5   5    5        5     5      5            L   5      5    5   5    5        5    5   5    5    5       5     5      5              - Sensory: No deficits to light touch. No extinction to DSS.  - Coordination: No dysmetria on FNF   - Gait: Good initiation.  Mildly wide-based, normal stride and arm swing. Romberg negative.    PLAN:  83-year-old woman with cryptogenic seizures well controlled with Keppra (brand name only) 2500 mg extended release at night. Level/labs. Cervical soft collar and physical therapy for cervicalgia. If vertigo recurs, consider vestibular physical therapy +/- ENT referral; sounds most consistent w/ BPPV. Patient is comfortable with plan. All questions and concerns are addressed at this time.  Follow up in about 1 year (around 8/23/2024).     Patient Instructions   You came to Epilepsy Clinic because of your seizure disorder. Your seizures are well controlled on keppra 2500mg nightly. Please continue the same medication at the same dose.     Do not miss any doses of medication. If a dose of medication is missed, take it as soon as it is remembered even if that means doubling up on the dose. Please get a lab test to check out the blood level of medication.     If the vertigo occurs again let us know over the portal and we can put in a referral for physical therapy and/or to ENT (Ears  Nose Throat doctor) for further evaluation. It sounds like a type of vertigo called benign paroxysmal positional vertigo (BPPV).     We gave you a prescription for a soft neck collar to use as needed when you start to experience the head heaviness sensation and pressure. If does not help, you do not need to use it. We also put in a referral for physical therapy to help with neck muscle spasms. They will call you to schedule.    Get regular sleep. Go to sleep at the same time and wake up at the same time every day. People with epilepsy require more sleep than people without epilepsy.  Sleeping 10-12 hours a day can be normal for a person with epilepsy.  Every seizure makes it harder to prevent the next seizure. Epilepsy is associated with SUDEP, or sudden unexpected death in epilepsy.  The risk is significantly higher if convulsive seizures are not well controlled. For more information, check out these websites: https://www.epilepsy.com/, https://www.epilepsyallianceamer[place].org/, www.[place]-epilepsy.org, www.womenandepilepsy.org.  If you are interested in meeting other individuals in our epilepsy community, please reach out to the [facility] [place] ([phone], [phone], [email or web address]).  They organize many informative and fun activities in the region.  They can provide you invaluable information on how to get access to resources available for patients living with epilepsy as well as a rich community of like-minded individuals who are all learning to cope with the same issues.  It is very important to remember, you are not alone.     Per [place] law, no episodes of loss of consciousness for 6 months before driving.  Avoid dangerous situations.  For example, no baths/pools alone, no heights, no power tools.  Wear a bike helmet.  If breakthrough seizures occur that are different in character, frequency, or duration from normal episodes, please patient portal me or call the office and we will  decide the next steps. If multiple seizures occur in a row without return back to baseline, 911 needs to be called.     Return to clinic in 1 year or sooner with issues.  Please patient portal with any questions or concerns.    Michelle Hassan PA-C   Neurology-Epilepsy  [facility]    Forms/Letters/Disability/DMV Paperwork: We understand the importance of filling out forms and providing letters in a timely manner.  However, many of these forms have very tricky language and once an official form is submitted as part of the medical record, it can not be modified or erased.  Please work with us in order to get these forms filled out in the most complete, accurate, and efficient way. 1.  Once you are aware that a form will need to be completed, please make an appointment.  A virtual appointment with Dr. Cintron or Judi is perfectly fine. 2.  Please fill out the form as much as you can.  There are many questions that we do not have an answer for.  Please bring a blank copy of the form and your partially filled out form to the clinic visit or send them to us over the portal.  We will complete the form together with you during the clinic visit, sign it, and either return it to you or send it to the correct destination.  Every form will require an appointment however we can fill out multiple forms at once if needed.  Please do not hesitate to reach out with any questions or concerns about this policy.  We are trying to make sure that we have a system in place to meet this need which works for everyone involved.  Thank you for your understanding.       Problem List Items Addressed This Visit          Neuro    Nonintractable generalized idiopathic epilepsy without status epilepticus - Primary    Overview     Dx: 2000  Followed by Dr. Saravanan Perez  Last seizure: 2020         Relevant Orders    Levetiracetam level    CBC auto differential (Completed)    Comprehensive metabolic panel     Other Visit Diagnoses        Cervicalgia        Relevant Orders    HME - OTHER    Ambulatory referral/consult to Physical/Occupational Therapy    Benign paroxysmal positional vertigo, unspecified laterality              Disclaimer: This note has been generated using voice-recognition software. There may be typographical errors that were missed during proof-reading.     LABS:  Recent Labs   Lab 10/21/20  0927 06/14/21  1047 03/18/22  1006 05/26/22  1517 10/14/22  1133   WBC  --    < > 4.67  --  7.88   Hemoglobin  --    < > 12.7  --  13.3   Hematocrit  --    < > 40.1  --  41.0   Platelets  --    < > 216  --  228   Sodium  --    < > 141 139 138   Potassium  --    < > 4.8 4.8 5.1   BUN  --    < > 12 19 11   Creatinine  --    < > 0.7 0.8 0.8   eGFR if African American  --    < > >60.0 >60.0  --    eGFR if non African American  --    < > >60.0 >60.0  --    Hemoglobin A1C  --   --   --   --  5.5   Hemoglobin A1c 5.5  --   --   --   --    TSH  --   --  2.789  --   --     < > = values in this interval not displayed.       Recent Labs   Lab 08/30/22  1402   Levetiracetam Lvl 37.7        IMAGING:  Recent imaging is personally reviewed with the patient.    Results for orders placed during the hospital encounter of 11/10/21    MRI IAC/Temporal Bones W W/O Contrast    Impression  1. Overall unremarkable MRI performed with attention towards the internal auditory canals/temporal bones.  No definite finding identified to account for the patient's reported right-sided sensorineural hearing loss.  2. Additional details, as per the body of report.      Electronically signed by: Km Root  Date:    11/11/2021  Time:    08:59    Results for orders placed during the hospital encounter of 07/12/21    DXA Bone Density Spine And Hip    Impression  *Osteoporosis on treatment with Fosamax    RECOMMENDATIONS:  *Daily calcium intake 6439-2340 mg, dietary sources preferred; Vitamin D 1030-8533 IU daily.  *Weight bearing exercise and fall precautions.  *If the  patient has been treated with Fosamax for a period of at least 5 years and has not fractured, a drug holiday can be considered.  Continuing treatment is also reasonable if the patient is deemed to be at high risk for fracture.  *Repeat BMD in 2 years    EXPLANATION OF RESULTS:  T-score compares these results to the average bone density of a 20-29 year-old of the same gender.    Z-score compares this result to the average bone density to people of the same age, gender, and race.    The amounts indicate the number of standard deviations above or below the mean.    * Osteoporosis is generally defined as having a T-score between less than or equal to -2.5.    * Low bone mass (osteopenia) is generally defined as having a T-score between -1.0 and -2.5.    * The normal range is generally defined as having a T-score greater than or equal to -1.0.    * Calculated FRAX scores for fracture risk prediction may not be accurate in the setting of certain clinical factors such as pharmacologic therapy for osteoporosis, prior fragility fractures, high dose glucocorticoid use.      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    Urticaria, idiopathic 06/07/2017    Allergic rhinitis 06/07/2017    Eczema 06/07/2017    Nonintractable generalized idiopathic epilepsy without status epilepticus 06/14/2021    Age-related osteoporosis without current pathological fracture 06/14/2021     Resolved Ambulatory Problems     Diagnosis Date Noted    Allergic reaction 04/17/2017    Nuclear sclerosis, bilateral 08/19/2019    Post-operative state 09/30/2019    Nuclear sclerotic cataract of left eye 09/30/2019     Past Medical History:   Diagnosis Date    Cataract     Choroidal nevus     Encounter for blood transfusion     Epilepsy     H/O right wrist surgery 2009    Transfusion  reaction     Urticaria         PAST SURGICAL HISTORY:   Past Surgical History:   Procedure Laterality Date    CATARACT EXTRACTION W/  INTRAOCULAR LENS IMPLANT Right 08/19/2019    Dr. Freeman    CATARACT EXTRACTION W/  INTRAOCULAR LENS IMPLANT Right 08/19/2019    Procedure: EXTRACTION, CATARACT, WITH IOL INSERTION;  Surgeon: Maryuri Freeman MD;  Location: [facility];  Service: Ophthalmology;  Laterality: Right;  Laser    CATARACT EXTRACTION W/  INTRAOCULAR LENS IMPLANT Left 09/30/2019    Procedure: EXTRACTION, CATARACT, WITH IOL INSERTION;  Surgeon: Maryuri Freeman MD;  Location: [facility];  Service: Ophthalmology;  Laterality: Left;  LASER ASSISTED    NOSE SURGERY      WRIST SURGERY Right     2009        ALLERGIES: House dust mite   CURRENT MEDICATIONS:   Current Outpatient Medications   Medication Sig Dispense Refill    amoxicillin-clavulanate 500-125mg (AUGMENTIN) 500-125 mg Tab Take 1 tablet by mouth 3 (three) times daily.      atovaquone-proguaniL (MALARONE) 250-100 mg Tab Take one tablet daily for malaria prevention. Begin one day before entering malarious area and continue for 1 week after return. 24 tablet 0    dupilumab (DUPIXENT SYRINGE) 300 mg/2 mL Syrg Inject 2 mLs (1 syringe) into the skin every 14 (fourteen) days. 4 mL 11    KEPPRA  mg Tb24 24 hr tablet Take 5 tablets (2,500 mg total) by mouth every evening. 450 tablet 3    levocetirizine (XYZAL) 5 MG tablet Take 2 tablets (10 mg total) by mouth 2 (two) times daily. For chronic urticaria 120 tablet 6    olopatadine (PATANOL) 0.1 % ophthalmic solution INSTILL 1 DROP IN BOTH EYES TWICE DAILY AS NEEDED 5 mL 6    ondansetron (ZOFRAN) 4 MG tablet Take 1 tablet (4 mg total) by mouth daily as needed for Nausea. 30 tablet 1    penciclovir (DENAVIR) 1 % cream SMARTSIG:Topical Every 2 Hours      tacrolimus (PROTOPIC) 0.1 % ointment Apply topically 2 (two) times daily. As needed for exacerbations of eyelid eczema 100 g 6    triamcinolone acetonide 0.1% (KENALOG) 0.1 %  cream Apply topically 2 (two) times daily. Apply to affected areas as needed twice daily. Avoid use on face. 454 g 4     No current facility-administered medications for this visit.        FAMILY HISTORY:   Family History   Problem Relation Age of Onset    Cancer Brother     Asthma Mother     Amblyopia Neg Hx     Blindness Neg Hx     Cataracts Neg Hx     Glaucoma Neg Hx     Macular degeneration Neg Hx     Retinal detachment Neg Hx     Strabismus Neg Hx     Melanoma Neg Hx     Psoriasis Neg Hx     Lupus Neg Hx     Colon cancer Neg Hx     Esophageal cancer Neg Hx          SOCIAL HISTORY:   Social History     Socioeconomic History    Marital status:    Occupational History    Occupation: Retired      Comment: retired 2016   Tobacco Use    Smoking status: Never    Smokeless tobacco: Never   Substance and Sexual Activity    Alcohol use: Yes     Alcohol/week: 1.0 standard drink of alcohol     Types: 1 Glasses of wine per week     Comment: 1 glass of wine a day    Drug use: No    Sexual activity: Not Currently     Birth control/protection: None      Questions and concerns raised by the patient and family/care-giver(s) were addressed and they indicated understanding of everything discussed and agreed to plans as above.    Michelle Hassan PA-C   Neurology-Epilepsy  [facility]    Collaborating physician, Dr. Rach Cintron, was available during today's encounter.     I spent approximately 53 minutes on the day of this encounter preparing to see the patient, obtaining and reviewing history and results, performing a medically appropriate exam, counseling and educating the patient/family/caregiver, documenting clinical information, coordinating care, and ordering medications, tests, procedures, and referrals.

## 2023-08-23 NOTE — PATIENT INSTRUCTIONS
You came to Epilepsy Clinic because of your seizure disorder. Your seizures are well controlled on keppra 2500mg nightly. Please continue the same medication at the same dose.     Do not miss any doses of medication. If a dose of medication is missed, take it as soon as it is remembered even if that means doubling up on the dose. Please get a lab test to check out the blood level of medication.     If the vertigo occurs again let us know over the portal and we can put in a referral for physical therapy and/or to ENT (Ears Nose Throat doctor) for further evaluation. It sounds like a type of vertigo called benign paroxysmal positional vertigo (BPPV).     We gave you a prescription for a soft neck collar to use as needed when you start to experience the head heaviness sensation and pressure. If does not help, you do not need to use it. We also put in a referral for physical therapy to help with neck muscle spasms. They will call you to schedule.    Get regular sleep. Go to sleep at the same time and wake up at the same time every day. People with epilepsy require more sleep than people without epilepsy.  Sleeping 10-12 hours a day can be normal for a person with epilepsy.  Every seizure makes it harder to prevent the next seizure. Epilepsy is associated with SUDEP, or sudden unexpected death in epilepsy.  The risk is significantly higher if convulsive seizures are not well controlled. For more information, check out these websites: https://www.epilepsy.com/, https://www.epilepsyallianceamer[place].org/, www.[place]-epilepsy.org, www.womenandepilepsy.org.  If you are interested in meeting other individuals in our epilepsy community, please reach out to the [facility] [place] ([phone], [phone], [email or web address]@[email or web address]).  They organize many informative and fun activities in the region.  They can provide you invaluable information on how to get access to resources available for patients living with epilepsy  as well as a rich community of like-minded individuals who are all learning to cope with the same issues.  It is very important to remember, you are not alone.     Per [place] law, no episodes of loss of consciousness for 6 months before driving.  Avoid dangerous situations.  For example, no baths/pools alone, no heights, no power tools.  Wear a bike helmet.  If breakthrough seizures occur that are different in character, frequency, or duration from normal episodes, please patient portal me or call the office and we will decide the next steps. If multiple seizures occur in a row without return back to baseline, 911 needs to be called.     Return to clinic in 1 year or sooner with issues.  Please patient portal with any questions or concerns.    Mcihelle Hassan PA-C   Neurology-Epilepsy  [facility]    Forms/Letters/Disability/DMV Paperwork: We understand the importance of filling out forms and providing letters in a timely manner.  However, many of these forms have very tricky language and once an official form is submitted as part of the medical record, it can not be modified or erased.  Please work with us in order to get these forms filled out in the most complete, accurate, and efficient way. 1.  Once you are aware that a form will need to be completed, please make an appointment.  A virtual appointment with Dr. Cintron or Judi is perfectly fine. 2.  Please fill out the form as much as you can.  There are many questions that we do not have an answer for.  Please bring a blank copy of the form and your partially filled out form to the clinic visit or send them to us over the portal.  We will complete the form together with you during the clinic visit, sign it, and either return it to you or send it to the correct destination.  Every form will require an appointment however we can fill out multiple forms at once if needed.  Please do not hesitate to reach out with any questions or concerns  about this policy.  We are trying to make sure that we have a system in place to meet this need which works for everyone involved.  Thank you for your understanding.

## 2023-08-25 ENCOUNTER — PATIENT MESSAGE (OUTPATIENT)
Dept: NEUROLOGY | Facility: CLINIC | Age: 83
End: 2023-08-25
Payer: MEDICARE

## 2023-08-25 DIAGNOSIS — G40.309 NONINTRACTABLE GENERALIZED IDIOPATHIC EPILEPSY WITHOUT STATUS EPILEPTICUS: Primary | ICD-10-CM

## 2023-08-26 LAB — LEVETIRACETAM SERPL-MCNC: 51.7 UG/ML (ref 3–60)

## 2023-08-28 RX ORDER — LEVETIRACETAM 500 MG/1
2000 TABLET, FILM COATED, EXTENDED RELEASE ORAL NIGHTLY
Qty: 360 TABLET | Refills: 3
Start: 2023-08-28 | End: 2023-11-02 | Stop reason: SDUPTHER

## 2023-08-28 NOTE — TELEPHONE ENCOUNTER
Slightly elevated potassium level. Extended release brand-name Keppra decreased from 2500 mg nightly-> 2000 mg nightly (per patient preference), repeat levetiracetam level and CMP on 10/02/2023.    Recent Labs   Lab 10/21/20  0927 06/14/21  1047 03/18/22  1006 05/26/22  1517 10/14/22  1133 08/23/23  1116   WBC  --    < > 4.67  --  7.88 5.17   Hemoglobin  --    < > 12.7  --  13.3 13.1   Hematocrit  --    < > 40.1  --  41.0 40.9   Platelets  --    < > 216  --  228 206   Sodium  --    < > 141 139 138 139   Potassium  --    < > 4.8 4.8 5.1 5.6 H   BUN  --    < > 12 19 11 12   Creatinine  --    < > 0.7 0.8 0.8 0.7   eGFR if African American  --    < > >60.0 >60.0  --   --    eGFR if non African American  --    < > >60.0 >60.0  --   --    Hemoglobin A1C  --   --   --   --  5.5  --    Hemoglobin A1c 5.5  --   --   --   --   --    TSH  --   --  2.789  --   --   --    AST  --    < > 21 19 23 21   ALT  --    < > 12 14 13 13    < > = values in this interval not displayed.       Rach Cintron MD PhD FACNS  Neurology-Epilepsy  [facility].

## 2023-09-11 ENCOUNTER — PATIENT MESSAGE (OUTPATIENT)
Dept: ADMINISTRATIVE | Facility: HOSPITAL | Age: 83
End: 2023-09-11
Payer: MEDICARE

## 2023-09-12 ENCOUNTER — CLINICAL SUPPORT (OUTPATIENT)
Dept: REHABILITATION | Facility: HOSPITAL | Age: 83
End: 2023-09-12
Payer: MEDICARE

## 2023-09-12 DIAGNOSIS — R29.898 UPPER EXTREMITY WEAKNESS: ICD-10-CM

## 2023-09-12 DIAGNOSIS — M54.2 CERVICALGIA: ICD-10-CM

## 2023-09-12 DIAGNOSIS — R29.898 DECREASED RANGE OF MOTION OF NECK: ICD-10-CM

## 2023-09-12 DIAGNOSIS — R29.3 POSTURE ABNORMALITY: ICD-10-CM

## 2023-09-12 PROCEDURE — 97161 PT EVAL LOW COMPLEX 20 MIN: CPT

## 2023-09-12 PROCEDURE — 97112 NEUROMUSCULAR REEDUCATION: CPT

## 2023-09-13 PROBLEM — R29.3 POSTURE ABNORMALITY: Status: ACTIVE | Noted: 2023-09-13

## 2023-09-13 PROBLEM — R29.898 UPPER EXTREMITY WEAKNESS: Status: ACTIVE | Noted: 2023-09-13

## 2023-09-13 PROBLEM — R29.898 DECREASED RANGE OF MOTION OF NECK: Status: ACTIVE | Noted: 2023-09-13

## 2023-09-13 NOTE — PLAN OF CARE
[facility]   Physical Therapy Initial Evaluation      Name: Gabriela Edwards  Clinic Number: [id number]    Therapy Diagnosis:   Encounter Diagnoses   Name Primary?    Cervicalgia     Posture abnormality     Decreased range of motion of neck     Upper extremity weakness         Physician: Michelle Hassan PA-C    Physician Orders: PT Eval and Treat   Medical Diagnosis from Referral: M54.2 (ICD-10-CM) - Cervicalgia  Evaluation Date: 9/12/2023  Authorization Period Expiration: 8/22/2024  Plan of Care Expiration: 12/6/2023  Progress Note Due: 10/12/2023  Visit # / Visits authorized: 1/ 1   FOTO: 1/ 3    Precautions: Standard     Time In: 1255pm  Time Out: 140pm  Total Billable Time: 45 minutes    Subjective     Date of onset: over the past few months    History of current condition - Gabriela reports: that she has been dealing with some pain in the back of her neck and top of her head. She works part time as an  and gets most of her pain with prolonged sitting and working at her desk. She also notices pain with looking up and turning her head. She denies any pain that radiates down into her shoulder or arms and does not have any N/T at any point. She also feels like her head gets very heavy after sitting up for a while. She is not sure if she would like to try formal PT for her symptoms at this time but is open to an assessment and possible treatment if necessary. She does have a history of epilepsy and vertigo that is currently well managed.     Falls: A few years ago, resulting in a broken R wrist    Imaging: none:     Prior Therapy: Not for her neck  Social History: 2 story home, uses stairs daily and is very comfortable with them, lives alone  Occupation: Semi retired    Prior Level of Function: no pain or difficulty with ADLs or work activities   Current Level of Function: minimal to moderate pain and difficulty with ADLs and work activities     Pain:  Current 1/10, worst  3/10, best 0/10   Location: posterior neck, and L upper cervical spine    Description: Aching and Dull  Aggravating Factors: prolonged sitting, looking up, turning to head  Easing Factors: pain medication and rest    Patients goals: to learn how to manage her symptoms over time     Medical History:   Past Medical History:   Diagnosis Date    Cataract     Choroidal nevus     Eczema     Encounter for blood transfusion     Epilepsy     H/O right wrist surgery 2009    Nuclear sclerosis, bilateral 8/19/2019    Nuclear sclerotic cataract of left eye 9/30/2019    Transfusion reaction     h/o hepatitis that was treated     Urticaria        Surgical History:   Gabriela Edwards  has a past surgical history that includes Wrist surgery (Right); Nose surgery; Cataract extraction w/  intraocular lens implant (Right, 08/19/2019); Cataract extraction w/  intraocular lens implant (Right, 08/19/2019); and Cataract extraction w/  intraocular lens implant (Left, 09/30/2019).    Medications:   Gabriela has a current medication list which includes the following prescription(s): dupixent syringe and keppra xr.    Allergies:   Review of patient's allergies indicates:   Allergen Reactions    House dust mite Hives, Itching, Rash and Swelling        Objective      Observation: patient ambulates into the clinic independently today an in no acute distress at this time    Posture: she stands with a slightly forward head posture. There is increased flexion of her CT junction but her  thoracic spine flattens out in the middle and lower sections. There is bilateral shoulder and scapula depression with downward rotation and winging.      Cervical ROM: (measured in degrees)    Degrees Quality   Flexion 35 Anterior shear in lower C-spin   Extension 50 L sided upper cervical pain   Right SB 20    Left SB 20    Right rotation 50    Left rotation 50 L sided upper cervical pain     Shoulder Active/ Passive ROM: (measured in degrees)   Shoulder Right UE  "Left UE   Flexion  WFLs WFLs   Abduction WFLs WFLs   ER WFLs WFLs   IR WFLs WFLs     Sensation: Dermatomes: Intact      Upper Extremity Strength: (grading 1-5 out of 5)      Right UE Left UE   Lower Trap: 3/5 3+/5   Middle Trap: 3/5 3+/5     Cervical Spine Special Tests: ((+): positive; (-): negative)   Compression -   Spurling's A -   Spurling's B -   Distraction -   First Rib Elevated bilaterally      Joint Mobility: (graded 0-6 out of 6) she is limited with upper cervical flexion and upper cervical rotation bilaterally. Side gliding throughout the cervical spine is good. She is limited with prone CT junction gapping.       Intake Outcome Measure for FOTO Neck Survey    Therapist reviewed FOTO scores for Gabriela Edwards on 9/12/2023.   FOTO report - see Media section or FOTO account episode details.    Intake Score: 92%         Treatment     Total Treatment time (time-based codes) separate from Evaluation: 10 minutes     Gabriela received the treatments listed below:      neuromuscular re-education activities to improve: Coordination, Kinesthetic, and Proprioception for 10 minutes. The following activities were included:    Chin tucks, 15x with 3" holds  Wall slides, 15x  B shoulder ER with YTB, 10x with 3" holds    Patient Education and Home Exercises     Education provided:   - HEP  - Plan of care  - Importance of improving strength and mobility in her neck    Written Home Exercises Provided: Patient instructed to cont prior HEP. Exercises were reviewed and Gabriela was able to demonstrate them prior to the end of the session.  Gabriela demonstrated good  understanding of the education provided. See EMR under Patient Instructions for exercises provided during therapy sessions.    Assessment     Gabriela is a 83 y.o. female referred to outpatient Physical Therapy with a medical diagnosis of Cervicalgia. Patient presents with a recent history of neck pain. She displays postural abnormalities, decreased and pain " cervical spine ROM, decreased upper cervical spine mobility, scapular weakness, impaired scapular control and impaired functional independence.     Patient prognosis is Good.   Patient will benefit from skilled outpatient Physical Therapy to address the deficits stated above and in the chart below, provide patient /family education, and to maximize patientt's level of independence.     Plan of care discussed with patient: Yes  Patient's spiritual, cultural and educational needs considered and patient is agreeable to the plan of care and goals as stated below:     Anticipated Barriers for therapy: none    Medical Necessity is demonstrated by the following  History  Co-morbidities and personal factors that may impact the plan of care [x] LOW: no personal factors / co-morbidities  [] MODERATE: 1-2 personal factors / co-morbidities  [] HIGH: 3+ personal factors / co-morbidities    Moderate / High Support Documentation:   Co-morbidities affecting plan of care: none    Personal Factors:   no deficits     Examination  Body Structures and Functions, activity limitations and participation restrictions that may impact the plan of care [] LOW: addressing 1-2 elements  [x] MODERATE: 3+ elements  [] HIGH: 4+ elements (please support below)    Moderate / High Support Documentation: ROM, strength and motor control     Clinical Presentation [x] LOW: stable  [] MODERATE: Evolving  [] HIGH: Unstable     Decision Making/ Complexity Score: low       Goals:  Short Term Goals (6 Weeks):   1. Pt will be independent with HEP to supplement PT in improving pain free cervical mobility  2. Pt will improve cervical rotation AROM by 5 deg to improve cervical mobility for driving  3. Pt will improve UE strength by 1/2 MMT grade to improve strength for lifting and carrying tasks.  4. Pt will demonstrate improved sitting posture to decrease pain experienced in head and neck.  Long Term Goals (12 Weeks):   1. Pt will improve FOTO to </=92%  limitation to improve perceived limitation with changing and maintaining mobility.  2. Pt will improve cervical rotation AROM by 10 deg to improve cervical mobility for driving  3. Pt will improve UE strength by 1 MMT grade to improve strength for lifting and carrying tasks.  4. Pt will report no pain with prolonged sitting while at work to promote improved QOL.    Plan     Plan of care Certification: 9/12/2023 to 12/6/2023.    Outpatient Physical Therapy 1 times weekly for 12 weeks to include the following interventions: Gait Training, Manual Therapy, Moist Heat/ Ice, Neuromuscular Re-ed, Patient Education, Self Care, Therapeutic Activities, and Therapeutic Exercise.     YENY SALGADO, PT        Physician's Signature: _________________________________________ Date: ________________

## 2023-09-14 ENCOUNTER — PATIENT MESSAGE (OUTPATIENT)
Dept: INTERNAL MEDICINE | Facility: CLINIC | Age: 83
End: 2023-09-14
Payer: MEDICARE

## 2023-09-14 DIAGNOSIS — Z78.0 POSTMENOPAUSAL ESTROGEN DEFICIENCY: Primary | ICD-10-CM

## 2023-09-25 ENCOUNTER — HOSPITAL ENCOUNTER (OUTPATIENT)
Dept: RADIOLOGY | Facility: HOSPITAL | Age: 83
Discharge: HOME | End: 2023-09-25
Attending: NURSE PRACTITIONER
Payer: MEDICARE

## 2023-09-25 DIAGNOSIS — Z78.0 POSTMENOPAUSAL ESTROGEN DEFICIENCY: ICD-10-CM

## 2023-09-25 PROCEDURE — 77080 DXA BONE DENSITY AXIAL SKELETON 1 OR MORE SITES: ICD-10-PCS | Mod: 26,,, | Performed by: INTERNAL MEDICINE

## 2023-09-25 PROCEDURE — 77080 DXA BONE DENSITY AXIAL: CPT | Mod: TC

## 2023-09-25 PROCEDURE — 77080 DXA BONE DENSITY AXIAL: CPT | Mod: 26,,, | Performed by: INTERNAL MEDICINE

## 2023-09-27 ENCOUNTER — PATIENT MESSAGE (OUTPATIENT)
Dept: NEUROLOGY | Facility: CLINIC | Age: 83
End: 2023-09-27
Payer: MEDICARE

## 2023-10-02 ENCOUNTER — LAB VISIT (OUTPATIENT)
Dept: LAB | Facility: HOSPITAL | Age: 83
End: 2023-10-02
Attending: PSYCHIATRY & NEUROLOGY
Payer: MEDICARE

## 2023-10-02 ENCOUNTER — PATIENT MESSAGE (OUTPATIENT)
Dept: INTERNAL MEDICINE | Facility: CLINIC | Age: 83
End: 2023-10-02
Payer: MEDICARE

## 2023-10-02 ENCOUNTER — TELEPHONE (OUTPATIENT)
Dept: INTERNAL MEDICINE | Facility: CLINIC | Age: 83
End: 2023-10-02
Payer: MEDICARE

## 2023-10-02 DIAGNOSIS — G40.309 NONINTRACTABLE GENERALIZED IDIOPATHIC EPILEPSY WITHOUT STATUS EPILEPTICUS: ICD-10-CM

## 2023-10-02 DIAGNOSIS — M81.0 AGE-RELATED OSTEOPOROSIS WITHOUT CURRENT PATHOLOGICAL FRACTURE: Primary | ICD-10-CM

## 2023-10-02 LAB
ALBUMIN SERPL BCP-MCNC: 4.1 G/DL (ref 3.5–5.2)
ALP SERPL-CCNC: 50 U/L (ref 55–135)
ALT SERPL W/O P-5'-P-CCNC: 11 U/L (ref 10–44)
ANION GAP SERPL CALC-SCNC: 6 MMOL/L (ref 8–16)
AST SERPL-CCNC: 20 U/L (ref 10–40)
BILIRUB SERPL-MCNC: 0.5 MG/DL (ref 0.1–1)
BUN SERPL-MCNC: 13 MG/DL (ref 8–23)
CALCIUM SERPL-MCNC: 10.3 MG/DL (ref 8.7–10.5)
CHLORIDE SERPL-SCNC: 106 MMOL/L (ref 95–110)
CO2 SERPL-SCNC: 29 MMOL/L (ref 23–29)
CREAT SERPL-MCNC: 0.7 MG/DL (ref 0.5–1.4)
EST. GFR  (NO RACE VARIABLE): >60 ML/MIN/1.73 M^2
GLUCOSE SERPL-MCNC: 81 MG/DL (ref 70–110)
POTASSIUM SERPL-SCNC: 4.6 MMOL/L (ref 3.5–5.1)
PROT SERPL-MCNC: 7.4 G/DL (ref 6–8.4)
SODIUM SERPL-SCNC: 141 MMOL/L (ref 136–145)

## 2023-10-02 PROCEDURE — 80053 COMPREHEN METABOLIC PANEL: CPT | Performed by: PSYCHIATRY & NEUROLOGY

## 2023-10-02 PROCEDURE — 80177 DRUG SCRN QUAN LEVETIRACETAM: CPT | Performed by: PSYCHIATRY & NEUROLOGY

## 2023-10-02 NOTE — TELEPHONE ENCOUNTER
----- Message from Derek Cruz MD sent at 9/29/2023  4:31 PM CDT -----  Worsening bone density after fosamax holiday. Would like to send her to endocrinology for evaluation for additional treatment.

## 2023-10-05 LAB — LEVETIRACETAM SERPL-MCNC: 43.9 UG/ML (ref 3–60)

## 2023-10-30 ENCOUNTER — PATIENT MESSAGE (OUTPATIENT)
Dept: INTERNAL MEDICINE | Facility: CLINIC | Age: 83
End: 2023-10-30
Payer: MEDICARE

## 2023-11-02 DIAGNOSIS — G40.309 NONINTRACTABLE GENERALIZED IDIOPATHIC EPILEPSY WITHOUT STATUS EPILEPTICUS: ICD-10-CM

## 2023-11-02 RX ORDER — LEVETIRACETAM 500 MG/1
2000 TABLET, FILM COATED, EXTENDED RELEASE ORAL NIGHTLY
Qty: 360 TABLET | Refills: 3 | Status: SHIPPED | OUTPATIENT
Start: 2023-11-02

## 2023-11-02 NOTE — TELEPHONE ENCOUNTER
Extended release brand-name Keppra 2000 mg nightly    Rach Cintron MD PhD [facility]  [facility]urology-Epilepsy  [facility].

## 2023-11-09 ENCOUNTER — OFFICE VISIT (OUTPATIENT)
Dept: INTERNAL MEDICINE | Facility: CLINIC | Age: 83
End: 2023-11-09
Payer: MEDICARE

## 2023-11-09 VITALS
HEART RATE: 58 BPM | WEIGHT: 129.31 LBS | DIASTOLIC BLOOD PRESSURE: 68 MMHG | OXYGEN SATURATION: 99 % | SYSTOLIC BLOOD PRESSURE: 124 MMHG | BODY MASS INDEX: 22.07 KG/M2 | HEIGHT: 64 IN

## 2023-11-09 DIAGNOSIS — G40.909 NONINTRACTABLE EPILEPSY WITHOUT STATUS EPILEPTICUS, UNSPECIFIED EPILEPSY TYPE: Primary | ICD-10-CM

## 2023-11-09 DIAGNOSIS — L50.1 URTICARIA, IDIOPATHIC: ICD-10-CM

## 2023-11-09 DIAGNOSIS — E78.5 HYPERLIPIDEMIA, UNSPECIFIED HYPERLIPIDEMIA TYPE: ICD-10-CM

## 2023-11-09 DIAGNOSIS — R79.9 ABNORMAL FINDING OF BLOOD CHEMISTRY, UNSPECIFIED: ICD-10-CM

## 2023-11-09 PROCEDURE — 99214 OFFICE O/P EST MOD 30 MIN: CPT | Mod: S$PBB,,, | Performed by: INTERNAL MEDICINE

## 2023-11-09 PROCEDURE — 99214 PR OFFICE/OUTPT VISIT, EST, LEVL IV, 30-39 MIN: ICD-10-PCS | Mod: S$PBB,,, | Performed by: INTERNAL MEDICINE

## 2023-11-09 PROCEDURE — 99213 OFFICE O/P EST LOW 20 MIN: CPT | Mod: PBBFAC | Performed by: INTERNAL MEDICINE

## 2023-11-09 PROCEDURE — 99999 PR PBB SHADOW E&M-EST. PATIENT-LVL III: CPT | Mod: PBBFAC,,, | Performed by: INTERNAL MEDICINE

## 2023-11-09 PROCEDURE — 99999 PR PBB SHADOW E&M-EST. PATIENT-LVL III: ICD-10-PCS | Mod: PBBFAC,,, | Performed by: INTERNAL MEDICINE

## 2023-11-09 NOTE — PROGRESS NOTES
"    CHIEF COMPLAINT     Chief Complaint   Patient presents with    Annual Exam       HPI     Gabriela Edwards is a 83 y.o. female hx of epilepsy, indiopathic urticaria here today for     Patient reports issue with insurance form completed. Reported that I incorrectly entered date last sz 2003. Started Keppra in 2010.     Sz controlled on keppra  Using dupixent for urticaria    Went on trip to [place] and then [place] for the marathon. She is walking 2 miles most days    Personally Reviewed Patient's Medical, surgical, family and social hx. Changes updated in [facility].  Care Team updated in Epic    Review of Systems:  Review of Systems   Cardiovascular:  Negative for leg swelling.       Health Maintenance:   Reviewed with patient  Due for the following:      PHYSICAL EXAM     /68 (BP Location: Right arm, Patient Position: Sitting, BP Method: Medium (Manual))   Pulse (!) 58   Ht 5' 4" (1.626 m)   Wt 58.7 kg (129 lb 4.8 oz)   SpO2 99%   BMI 22.19 kg/m²     Gen: Well Appearing, NAD  HEENT: PERR, EOMI  Neck: FROM, no thyromegaly, no cervical adenopathy  CVD: RRR, no M/R/G  Pulm: Normal work of breathing, CTAB, no wheezing  Abd:  Soft, NT, ND non TTP, no mass  MSK: no LE edema  Neuro: A&Ox3, gait normal, speech normal  Mood; Mood normal, behavior normal, thought process linear       LABS     Labs reviewed; ordered      ASSESSMENT     1. Nonintractable epilepsy without status epilepticus, unspecified epilepsy type  CBC Auto Differential    Comprehensive Metabolic Panel      2. Urticaria, idiopathic        3. Hyperlipidemia, unspecified hyperlipidemia type  Hemoglobin A1C    Lipid Panel      4. Abnormal finding of blood chemistry, unspecified  Hemoglobin A1C              Plan     Gabriela Edwards is a 83 y.o. female with x of epilepsy, indiopathic urticaria  Still active, lives independently. She is mobile and mentally sharp.    1. Nonintractable epilepsy without status epilepticus, unspecified epilepsy type  Followed " by neurology continued keppra  - CBC Auto Differential; Future  - Comprehensive Metabolic Panel; Future    2. Urticaria, idiopathic  Followed by allergist, continue dupixent    3. Hyperlipidemia, unspecified hyperlipidemia type  Will recheck lipid panel  - Hemoglobin A1C; Future  - Lipid Panel; Future    4. Abnormal finding of blood chemistry, unspecified  - Hemoglobin A1C; Future      Derek Cruz MD

## 2023-11-14 ENCOUNTER — PATIENT MESSAGE (OUTPATIENT)
Dept: OPTOMETRY | Facility: CLINIC | Age: 83
End: 2023-11-14
Payer: MEDICARE

## 2023-11-14 ENCOUNTER — PATIENT MESSAGE (OUTPATIENT)
Dept: INTERNAL MEDICINE | Facility: CLINIC | Age: 83
End: 2023-11-14
Payer: MEDICARE

## 2023-11-14 DIAGNOSIS — L23.9 ALLERGIC CONTACT DERMATITIS, UNSPECIFIED TRIGGER: ICD-10-CM

## 2023-11-14 RX ORDER — TOBRAMYCIN/DEXAMETHASONE 0.3 %-0.1%
OINTMENT (GRAM) OPHTHALMIC (EYE) NIGHTLY
Qty: 3.5 G | Refills: 0 | Status: SHIPPED | OUTPATIENT
Start: 2023-11-14 | End: 2023-11-17

## 2023-11-15 ENCOUNTER — LAB VISIT (OUTPATIENT)
Dept: LAB | Facility: HOSPITAL | Age: 83
End: 2023-11-15
Attending: INTERNAL MEDICINE
Payer: MEDICARE

## 2023-11-15 DIAGNOSIS — E78.5 HYPERLIPIDEMIA, UNSPECIFIED HYPERLIPIDEMIA TYPE: ICD-10-CM

## 2023-11-15 DIAGNOSIS — G40.909 NONINTRACTABLE EPILEPSY WITHOUT STATUS EPILEPTICUS, UNSPECIFIED EPILEPSY TYPE: ICD-10-CM

## 2023-11-15 DIAGNOSIS — R79.9 ABNORMAL FINDING OF BLOOD CHEMISTRY, UNSPECIFIED: ICD-10-CM

## 2023-11-15 LAB
ALBUMIN SERPL BCP-MCNC: 4.3 G/DL (ref 3.5–5.2)
ALP SERPL-CCNC: 55 U/L (ref 55–135)
ALT SERPL W/O P-5'-P-CCNC: 13 U/L (ref 10–44)
ANION GAP SERPL CALC-SCNC: 8 MMOL/L (ref 8–16)
AST SERPL-CCNC: 21 U/L (ref 10–40)
BASOPHILS # BLD AUTO: 0.05 K/UL (ref 0–0.2)
BASOPHILS NFR BLD: 1 % (ref 0–1.9)
BILIRUB SERPL-MCNC: 0.5 MG/DL (ref 0.1–1)
BUN SERPL-MCNC: 9 MG/DL (ref 8–23)
CALCIUM SERPL-MCNC: 10 MG/DL (ref 8.7–10.5)
CHLORIDE SERPL-SCNC: 106 MMOL/L (ref 95–110)
CHOLEST SERPL-MCNC: 211 MG/DL (ref 120–199)
CHOLEST/HDLC SERPL: 2.3 {RATIO} (ref 2–5)
CO2 SERPL-SCNC: 27 MMOL/L (ref 23–29)
CREAT SERPL-MCNC: 0.8 MG/DL (ref 0.5–1.4)
DIFFERENTIAL METHOD: ABNORMAL
EOSINOPHIL # BLD AUTO: 0.8 K/UL (ref 0–0.5)
EOSINOPHIL NFR BLD: 16.1 % (ref 0–8)
ERYTHROCYTE [DISTWIDTH] IN BLOOD BY AUTOMATED COUNT: 13.2 % (ref 11.5–14.5)
EST. GFR  (NO RACE VARIABLE): >60 ML/MIN/1.73 M^2
ESTIMATED AVG GLUCOSE: 111 MG/DL (ref 68–131)
GLUCOSE SERPL-MCNC: 87 MG/DL (ref 70–110)
HBA1C MFR BLD: 5.5 % (ref 4–5.6)
HCT VFR BLD AUTO: 41.1 % (ref 37–48.5)
HDLC SERPL-MCNC: 93 MG/DL (ref 40–75)
HDLC SERPL: 44.1 % (ref 20–50)
HGB BLD-MCNC: 13.1 G/DL (ref 12–16)
IMM GRANULOCYTES # BLD AUTO: 0 K/UL (ref 0–0.04)
IMM GRANULOCYTES NFR BLD AUTO: 0 % (ref 0–0.5)
LDLC SERPL CALC-MCNC: 107 MG/DL (ref 63–159)
LYMPHOCYTES # BLD AUTO: 2 K/UL (ref 1–4.8)
LYMPHOCYTES NFR BLD: 39.8 % (ref 18–48)
MCH RBC QN AUTO: 29.7 PG (ref 27–31)
MCHC RBC AUTO-ENTMCNC: 31.9 G/DL (ref 32–36)
MCV RBC AUTO: 93 FL (ref 82–98)
MONOCYTES # BLD AUTO: 0.5 K/UL (ref 0.3–1)
MONOCYTES NFR BLD: 10.8 % (ref 4–15)
NEUTROPHILS # BLD AUTO: 1.6 K/UL (ref 1.8–7.7)
NEUTROPHILS NFR BLD: 32.3 % (ref 38–73)
NONHDLC SERPL-MCNC: 118 MG/DL
NRBC BLD-RTO: 0 /100 WBC
PLATELET # BLD AUTO: 195 K/UL (ref 150–450)
PMV BLD AUTO: 10.8 FL (ref 9.2–12.9)
POTASSIUM SERPL-SCNC: 4.2 MMOL/L (ref 3.5–5.1)
PROT SERPL-MCNC: 7.6 G/DL (ref 6–8.4)
RBC # BLD AUTO: 4.41 M/UL (ref 4–5.4)
SODIUM SERPL-SCNC: 141 MMOL/L (ref 136–145)
TRIGL SERPL-MCNC: 55 MG/DL (ref 30–150)
WBC # BLD AUTO: 4.92 K/UL (ref 3.9–12.7)

## 2023-11-15 PROCEDURE — 85025 COMPLETE CBC W/AUTO DIFF WBC: CPT | Performed by: INTERNAL MEDICINE

## 2023-11-15 PROCEDURE — 80061 LIPID PANEL: CPT | Performed by: INTERNAL MEDICINE

## 2023-11-15 PROCEDURE — 83036 HEMOGLOBIN GLYCOSYLATED A1C: CPT | Performed by: INTERNAL MEDICINE

## 2023-11-15 PROCEDURE — 36415 COLL VENOUS BLD VENIPUNCTURE: CPT | Performed by: INTERNAL MEDICINE

## 2023-11-15 PROCEDURE — 80053 COMPREHEN METABOLIC PANEL: CPT | Performed by: INTERNAL MEDICINE

## 2023-11-20 RX ORDER — OLOPATADINE HYDROCHLORIDE 1 MG/ML
SOLUTION/ DROPS OPHTHALMIC
Qty: 5 ML | Refills: 6 | Status: SHIPPED | OUTPATIENT
Start: 2023-11-20

## 2023-11-20 NOTE — TELEPHONE ENCOUNTER
Good morning ,    Kindly find attached message regarding Rx refill request for your attention, kindly review and advise.  Patient's LOV was 02/02/2023.    Kind regards  Mariusz Sevilla MA

## 2023-11-24 ENCOUNTER — PATIENT MESSAGE (OUTPATIENT)
Dept: INTERNAL MEDICINE | Facility: CLINIC | Age: 83
End: 2023-11-24
Payer: MEDICARE

## 2023-12-05 ENCOUNTER — PATIENT MESSAGE (OUTPATIENT)
Dept: NEUROLOGY | Facility: CLINIC | Age: 83
End: 2023-12-05
Payer: MEDICARE

## 2024-01-18 ENCOUNTER — PATIENT MESSAGE (OUTPATIENT)
Dept: ADMINISTRATIVE | Facility: OTHER | Age: 84
End: 2024-01-18
Payer: MEDICARE

## 2024-01-30 ENCOUNTER — OFFICE VISIT (OUTPATIENT)
Dept: URGENT CARE | Facility: CLINIC | Age: 84
End: 2024-01-30
Payer: MEDICARE

## 2024-01-30 VITALS
BODY MASS INDEX: 22.02 KG/M2 | RESPIRATION RATE: 18 BRPM | TEMPERATURE: 99 F | WEIGHT: 129 LBS | HEIGHT: 64 IN | HEART RATE: 63 BPM | DIASTOLIC BLOOD PRESSURE: 77 MMHG | SYSTOLIC BLOOD PRESSURE: 124 MMHG | OXYGEN SATURATION: 100 %

## 2024-01-30 DIAGNOSIS — S59.901A ELBOW INJURY, RIGHT, INITIAL ENCOUNTER: Primary | ICD-10-CM

## 2024-01-30 DIAGNOSIS — S52.124A CLOSED NONDISPLACED FRACTURE OF HEAD OF RIGHT RADIUS, INITIAL ENCOUNTER: ICD-10-CM

## 2024-01-30 PROCEDURE — 99214 OFFICE O/P EST MOD 30 MIN: CPT | Mod: S$GLB,,, | Performed by: FAMILY MEDICINE

## 2024-01-30 PROCEDURE — 73080 X-RAY EXAM OF ELBOW: CPT | Mod: RT,S$GLB,, | Performed by: RADIOLOGY

## 2024-01-30 NOTE — PROGRESS NOTES
"Subjective:      Patient ID: Gabriela Edwards is a 83 y.o. female.    Vitals:  height is 5' 4" (1.626 m) and weight is 58.5 kg (129 lb). Her temperature is 98.6 °F (37 °C). Her blood pressure is 124/77 and her pulse is 63. Her respiration is 18 and oxygen saturation is 100%.     Chief Complaint: Arm Injury    Pt presents complaints of right arm injury due to a fall. Injury occurred yesterday. Pt states she tripped while walking down the street and tripped over uneven pavement. Pt states she is unable to move her right arm. Pain level 2 when not moving pain level shoots up to a 10 when trying to move it.    Pt is an 84 yo F who presents with right elbow pain after a fall while walking.  She fell with her arms outstretched. She has abrasions over her bilateral palms and right knee.  Her elbow hurts and cannot fully extend or flex her elbow.  Elbow did not make contact with the ground. Patient felt a shock up her arm after the injury    Arm Injury   The incident occurred 12 to 24 hours ago. The incident occurred in the street. The injury mechanism was a fall. The pain is present in the right hand and right elbow. The quality of the pain is described as shooting. The pain does not radiate. The pain is at a severity of 10/10. The pain is moderate. The pain has been Constant since the incident. Associated symptoms include numbness. Pertinent negatives include no chest pain, muscle weakness or tingling. The symptoms are aggravated by movement. She has tried nothing for the symptoms.     Cardiovascular:  Negative for chest pain.   Neurological:  Positive for numbness.      Objective:     Physical Exam   Constitutional: She is oriented to person, place, and time. She appears well-developed. She is cooperative.  Non-toxic appearance. She does not appear ill. No distress.   HENT:   Head: Normocephalic and atraumatic. Head is without abrasion, without contusion and without laceration.   Ears:   Right Ear: Hearing, tympanic " membrane, external ear and ear canal normal. No hemotympanum.   Left Ear: Hearing, tympanic membrane, external ear and ear canal normal. No hemotympanum.   Nose: Nose normal. No mucosal edema, rhinorrhea or nasal deformity. No epistaxis. Right sinus exhibits no maxillary sinus tenderness and no frontal sinus tenderness. Left sinus exhibits no maxillary sinus tenderness and no frontal sinus tenderness.   Mouth/Throat: Uvula is midline, oropharynx is clear and moist and mucous membranes are normal. No trismus in the jaw. Normal dentition. No uvula swelling. No posterior oropharyngeal erythema.   Eyes: Conjunctivae, EOM and lids are normal. Pupils are equal, round, and reactive to light. Right eye exhibits no discharge. Left eye exhibits no discharge. No scleral icterus.   Neck: Trachea normal and phonation normal. Neck supple. No tracheal deviation present. No neck rigidity present. No spinous process tenderness present. No muscular tenderness present.   Cardiovascular: Normal rate, regular rhythm, normal heart sounds and normal pulses.   Pulmonary/Chest: Effort normal and breath sounds normal. No respiratory distress.   Abdominal: Normal appearance and bowel sounds are normal. She exhibits no distension and no mass. Soft. There is no abdominal tenderness.   Musculoskeletal:         General: No deformity.      Right elbow: She exhibits decreased range of motion. She exhibits no swelling, no effusion, no deformity and no laceration. Tenderness found.      Left elbow: Normal.      Right wrist: Normal.      Left wrist: Normal.   Neurological: She is alert and oriented to person, place, and time. She has normal strength. No cranial nerve deficit or sensory deficit. She exhibits normal muscle tone. She displays no seizure activity. Coordination normal. GCS eye subscore is 4. GCS verbal subscore is 5. GCS motor subscore is 6.   Skin: Skin is warm, dry, intact, not diaphoretic and not pale. Capillary refill takes less than 2  seconds. Abrasions - upper ext.:  hand (left) and hand (right)  Abrasions - lower ext.:  knee (right)No abrasion, No burn, No bruising and No ecchymosis   Psychiatric: Her speech is normal and behavior is normal. Judgment and thought content normal.   Nursing note and vitals reviewed.  XR ELBOW COMPLETE 3 VIEW RIGHT    Result Date: 1/30/2024  EXAMINATION: XR ELBOW COMPLETE 3 VIEW RIGHT CLINICAL HISTORY: . Unspecified injury of right elbow, initial encounter TECHNIQUE: AP, lateral, and oblique views of the right elbow were performed. COMPARISON: None FINDINGS: Four 6 views right elbow. There is abnormal displacement of the anterior and posterior elbow fat pads.  There is cortical irregularity involving the radial head, concerning for fracture.  The ulna appears intact.  No dislocation.  No radiopaque foreign body.     1. Joint effusion noting findings concerning for radial head fracture.  Correlation is advised. Electronically signed by: Alex Bridges MD Date:    01/30/2024 Time:    12:53      Assessment:     1. Elbow injury, right, initial encounter    2. Closed nondisplaced fracture of head of right radius, initial encounter        Plan:       Elbow injury, right, initial encounter  -     XR ELBOW COMPLETE 3 VIEW RIGHT; Future; Expected date: 01/30/2024    Closed nondisplaced fracture of head of right radius, initial encounter  -     Ambulatory referral/consult to Orthopedics  -     SLING FOR HOME USE        Patient Instructions   Tylenol as needed for pain and ice  Stretching of elbow with flexion and extension exericises  Follow-up with orthopedist ASAP    You must understand that you have received treatment at an Urgent Care facility only, and that you may be  released before all of your medical problems are known or treated. Urgent Care facilities are not equipped to  handle life threatening emergencies. It is recommended that you seek care at an Emergency Department for  further evaluation of worsening or  concerning symptoms, or possibly life threatening conditions as  discussed.      If you develop chest pain, shortness of breath, throat swelling, tongue swelling, lightheadedness or any other causes for concern, proceed to ER.

## 2024-01-30 NOTE — PATIENT INSTRUCTIONS
Tylenol as needed for pain and ice  Stretching of elbow with flexion and extension exericises  Follow-up with orthopedist ASAP    You must understand that you have received treatment at an Urgent Care facility only, and that you may be  released before all of your medical problems are known or treated. Urgent Care facilities are not equipped to  handle life threatening emergencies. It is recommended that you seek care at an Emergency Department for  further evaluation of worsening or concerning symptoms, or possibly life threatening conditions as  discussed.      If you develop chest pain, shortness of breath, throat swelling, tongue swelling, lightheadedness or any other causes for concern, proceed to ER.

## 2024-02-02 ENCOUNTER — PATIENT MESSAGE (OUTPATIENT)
Dept: INTERNAL MEDICINE | Facility: CLINIC | Age: 84
End: 2024-02-02
Payer: MEDICARE

## 2024-02-19 ENCOUNTER — PATIENT MESSAGE (OUTPATIENT)
Dept: ADMINISTRATIVE | Facility: OTHER | Age: 84
End: 2024-02-19
Payer: MEDICARE

## 2024-03-13 ENCOUNTER — PATIENT MESSAGE (OUTPATIENT)
Dept: ADMINISTRATIVE | Facility: OTHER | Age: 84
End: 2024-03-13
Payer: MEDICARE

## 2024-03-18 ENCOUNTER — LAB VISIT (OUTPATIENT)
Dept: LAB | Facility: HOSPITAL | Age: 84
End: 2024-03-18
Attending: INTERNAL MEDICINE
Payer: MEDICARE

## 2024-03-18 ENCOUNTER — OFFICE VISIT (OUTPATIENT)
Dept: ENDOCRINOLOGY | Facility: CLINIC | Age: 84
End: 2024-03-18
Payer: MEDICARE

## 2024-03-18 VITALS
BODY MASS INDEX: 21.56 KG/M2 | DIASTOLIC BLOOD PRESSURE: 80 MMHG | SYSTOLIC BLOOD PRESSURE: 118 MMHG | OXYGEN SATURATION: 98 % | WEIGHT: 126.31 LBS | HEIGHT: 64 IN | HEART RATE: 54 BPM

## 2024-03-18 DIAGNOSIS — M81.0 AGE-RELATED OSTEOPOROSIS WITHOUT CURRENT PATHOLOGICAL FRACTURE: Primary | ICD-10-CM

## 2024-03-18 DIAGNOSIS — M81.0 AGE-RELATED OSTEOPOROSIS WITHOUT CURRENT PATHOLOGICAL FRACTURE: ICD-10-CM

## 2024-03-18 LAB
25(OH)D3+25(OH)D2 SERPL-MCNC: 24 NG/ML (ref 30–96)
ALBUMIN SERPL BCP-MCNC: 4.1 G/DL (ref 3.5–5.2)
ANION GAP SERPL CALC-SCNC: 7 MMOL/L (ref 8–16)
BUN SERPL-MCNC: 10 MG/DL (ref 8–23)
CALCIUM SERPL-MCNC: 9.9 MG/DL (ref 8.7–10.5)
CHLORIDE SERPL-SCNC: 102 MMOL/L (ref 95–110)
CO2 SERPL-SCNC: 26 MMOL/L (ref 23–29)
CREAT SERPL-MCNC: 0.8 MG/DL (ref 0.5–1.4)
EST. GFR  (NO RACE VARIABLE): >60 ML/MIN/1.73 M^2
GLUCOSE SERPL-MCNC: 87 MG/DL (ref 70–110)
PHOSPHATE SERPL-MCNC: 3.6 MG/DL (ref 2.7–4.5)
POTASSIUM SERPL-SCNC: 4.5 MMOL/L (ref 3.5–5.1)
PTH-INTACT SERPL-MCNC: 50.3 PG/ML (ref 9–77)
SODIUM SERPL-SCNC: 135 MMOL/L (ref 136–145)

## 2024-03-18 PROCEDURE — 99204 OFFICE O/P NEW MOD 45 MIN: CPT | Mod: S$PBB,,, | Performed by: INTERNAL MEDICINE

## 2024-03-18 PROCEDURE — 82306 VITAMIN D 25 HYDROXY: CPT | Performed by: INTERNAL MEDICINE

## 2024-03-18 PROCEDURE — G2211 COMPLEX E/M VISIT ADD ON: HCPCS | Mod: S$PBB,,, | Performed by: INTERNAL MEDICINE

## 2024-03-18 PROCEDURE — 99214 OFFICE O/P EST MOD 30 MIN: CPT | Mod: PBBFAC | Performed by: INTERNAL MEDICINE

## 2024-03-18 PROCEDURE — 99999 PR PBB SHADOW E&M-EST. PATIENT-LVL IV: CPT | Mod: PBBFAC,,, | Performed by: INTERNAL MEDICINE

## 2024-03-18 PROCEDURE — 83970 ASSAY OF PARATHORMONE: CPT | Performed by: INTERNAL MEDICINE

## 2024-03-18 PROCEDURE — 80069 RENAL FUNCTION PANEL: CPT | Performed by: INTERNAL MEDICINE

## 2024-03-18 PROCEDURE — 36415 COLL VENOUS BLD VENIPUNCTURE: CPT | Performed by: INTERNAL MEDICINE

## 2024-03-18 NOTE — ASSESSMENT & PLAN NOTE
Risk factors include age, petite frame    Previously on fosamax but on drug holiday for last 2 years with loss BMD   High fracture risk based on FRAX. Recommend parenteral treatment. Discussed Prolia and she agrees to this    Check:  - renal chem panel, PTH, Vit D 25 OH  - 24 hour urine Ca and Cr     Recommend:  Treatment: as above  Calcium:  recommended 1200 mg daily divided between diet and supplements. Written instructions provided to patient  Vitamin D: pending labs  DXA: due in 2025  Exercise: continue weight-bearing but discussed fall risk  Fall precautions: reviewed in detail  Dental health: regular visits, no planned procedures

## 2024-03-18 NOTE — PATIENT INSTRUCTIONS
I recommend the medication Prolia which is an injection every 6 months  If you chose not to start that, would resume Fosamax but I think Prolia is better choice    You can drop of urine collection to any [facility] lab    For calcium intake:  I advise you get 1200 mg per day of calcium, split up over the day into 2 to 4 divided doses.  This amount includes calcium from both dietary sources and/or calcium supplements, and it is best to get smaller amounts throughout the day rather than all of the calcium at one time; this allows for better absorption of the calcium.     To estimate calcium content from a nutrition label, the label lists the % calcium in that food.   For example, the label for an 8 oz glass of milk lists the calcium content as 30%.  This is equivalent to 300 mg of calcium (multiply the % by 10 to get the mg of calcium per serving).  It is best to try to get the majority of calcium intake from the diet.  I've included a table below of some calcium-rich foods.    If you are not getting enough calcium in the diet, then you can add low doses of calcium supplements.  For calcium supplements, your body can only absorb about 500-600 mg of calcium at one time.  Thus, it is best to split the tablets up over the course of a day.  It is also best to avoid taking calcium supplements at the same time as a calcium-rich food to maximize your calcium absorption.  Calcium carbonate is best taken with or after a meal.  Calcium citrate can be taken on an empty stomach or with/after a meal.  Please look at any multivitamin you are taking as these often usually contain calcium as well.       Estimated Calcium Content of Foods:  Produce  Serving Size Estimated Calcium*    Bipin greens, frozen 8 oz 360 mg   Broccoli efren 8 oz 200 mg   Kale, frozen 8 oz 180 mg   Soy Beans, green, boiled 8 oz 175 mg   Bok Deb, cooked, boiled 8 oz 160 mg   Figs, dried 2 figs 65 mg   Broccoli, fresh, cooked 8 oz 60 mg   Oranges 1 whole 55 mg    Seafood Serving Size Estimated Calcium*    Sardines, canned with bones 3 oz 325 mg   [place], canned with bones 3 oz 180 mg   Shrimp, canned 3 oz 125 mg   Dairy Serving Size Estimated Calcium*    Ricotta, part-skim 4 oz 335 mg   Yogurt, plain, low-fat 6 oz 310 mg   Milk, skim, low-fat, whole 8 oz 300 mg   Yogurt with fruit, low-fat 6 oz 260 mg   Mozzarella, part-skim 1 oz 210 mg   Cheddar 1 oz 205 mg   Yogurt, Greek 6 oz 200 mg   American Cheese 1 oz 195 mg   Feta Cheese 4 oz 140 mg   Cottage Cheese, 2% 4 oz 105 mg   Frozen yogurt, vanilla 8 oz 105 mg   Ice Cream, vanilla 8 oz 85 mg   Parmesan 1 tbsp 55 mg   Fortified Food Serving Size Estimated Calcium*   [place] milk, rice milk or soy milk, fortified 8 oz 300 mg   Orange juice and other fruit juices, fortified 8 oz 300 mg   Tofu, prepared with calcium 4 oz 205 mg   Waffle, frozen, fortified 2 pieces 200 mg   Oatmeal, fortified 1 packet 140 mg   English muffin, fortified 1 muffin 100 mg   Cereal, fortified 8 oz 100-1,000 mg   Other Serving Size Estimated Calcium*   Mac & cheese, frozen 1 package 325 mg   Pizza, cheese, frozen 1 serving 115 mg   Pudding, chocolate, prepared with 2% milk 4 oz 160 mg   Beans, baked, canned 4 oz 160 mg   *The calcium content listed for most foods is estimated and can vary due to multiple factors. Check the food label to determine how much calcium is in a particular product.  If you read the nutrition label for a food source, it lists the % calcium in that food.  For an 8 oz glass of milk, for example, the label states calcium 30%.  This is equivalent to 300 mg of calcium (multiply the listed number by 10).   **Table from the National [employer]opor[employer] Foundation

## 2024-03-18 NOTE — PROGRESS NOTES
"Gabriela Edwards is a 83 y.o. female referred by Dr. Derek Cruz for evaluation of osteoporosis    History of Present Illness  Osteoporosis/osteopenia diagnosed several years ago outside the system  She was on treatment with fosamax (unclear duration, maybe 5 years) but then had loss of BMD when drug holiday started so sent to discuss options    Treatment history:  Fosamax on drug holiday since 2021, thinks about 5 years of treatment    Fractures:  wrist fracture after falling down a flight of stairs about 15 years ago    Continues to run, does climb on ladders and other "stupid things" that she knows increase her risk of fracture    DXA 9/2023:  Lumbar spine (L1-L4):               T-score is -1.6, and Z-score is +1.1.   Compared with previous DXA, BMD at the lumbar spine has declined by 4.4%.   Femoral neck:                          T-score is -2.4, and Z-score is 0.0.   Total hip:                                T-score is -1.4, and Z-score is +0.7.   Compared with previous DXA, BMD at the total hip has declined by 4.9%.        Fracture Risk (FRAX)   23% risk of a major osteoporotic fracture in the next 10 years.   7.4% risk of hip fracture in the next 10 years.     Impression:   *Osteoporosis based on T-score between -1.0 and -2.5 and elevated risk based on FRAX  *Fracture risk is very high due to calculated 10 year risk of hip fracture >4.5% (FRAX).     RECOMMENDATIONS:  *Daily calcium intake 6080-1479 mg, dietary sources preferred; Vitamin D 8015-1652 IU daily.  *Weight bearing exercise and fall precautions.  *Given very high fracture risk, would consider anabolic agents (including teriparatide, abaloparatide, or romosozumab), denosumab or zoledronic acid as the preferred treatment options. Oral bisphosphonates can be considered as second-line therapy.  *Repeat BMD in 2 years.         Dental visits: regular visits, no planned procedures    Diet:   Calcium intake: quart of milk a week, salmon, leafy " greens    Supplements:   Calcium: denies  Vitamin D: denies  MVI: denies    Exercise:  runs      Menopause: 50's  HRT history:maybe a year    Glucocorticoid History: denies  Personal history of kidney stones: denies  Family history of bone disease or fracture: denies known    Maybe half inch of height loss        Current Outpatient Medications:     dupilumab (DUPIXENT SYRINGE) 300 mg/2 mL Syrg, Inject 2 mLs (1 syringe) into the skin every 14 (fourteen) days., Disp: 4 mL, Rfl: 11    KEPPRA  mg Tb24 24 hr tablet, Take 4 tablets (2,000 mg total) by mouth every evening., Disp: 360 tablet, Rfl: 3    olopatadine (PATANOL) 0.1 % ophthalmic solution, INSTILL 1 DROP IN BOTH EYES TWICE DAILY AS NEEDED, Disp: 5 mL, Rfl: 6    ROS as above    Objective:     Vitals:    03/18/24 1052   BP: 118/80   Pulse: (!) 54     Wt Readings from Last 3 Encounters:   03/18/24 1052 57.3 kg (126 lb 5.2 oz)   01/30/24 1143 58.5 kg (129 lb)   11/09/23 1420 58.7 kg (129 lb 4.8 oz)     Body mass index is 21.68 kg/m².  Physical Exam  Constitutional:       Appearance: She is well-developed.   HENT:      Head: Normocephalic.   Eyes:      Conjunctiva/sclera: Conjunctivae normal.   Pulmonary:      Effort: Pulmonary effort is normal.   Musculoskeletal:         General: Normal range of motion.   Skin:     General: Skin is warm.      Findings: No rash.   Neurological:      Mental Status: She is alert and oriented to person, place, and time.         Labs    Chemistry        Component Value Date/Time     11/15/2023 0756    K 4.2 11/15/2023 0756     11/15/2023 0756    CO2 27 11/15/2023 0756    BUN 9 11/15/2023 0756    CREATININE 0.8 11/15/2023 0756    GLU 87 11/15/2023 0756        Component Value Date/Time    CALCIUM 10.0 11/15/2023 0756    ALKPHOS 55 11/15/2023 0756    AST 21 11/15/2023 0756    ALT 13 11/15/2023 0756    BILITOT 0.5 11/15/2023 0756    ESTGFRAFRICA >60.0 05/26/2022 1517    EGFRNONAA >60.0 05/26/2022 1517        Lab Results    Component Value Date    CALCIUM 10.0 11/15/2023    CALCIUM 10.3 10/02/2023    CALCIUM 10.3 08/23/2023    ALKPHOS 55 11/15/2023    ALKPHOS 50 (L) 10/02/2023    ALKPHOS 49 (L) 08/23/2023    TSH 2.789 03/18/2022    TTGIGA 13 08/25/2022             Assessment and Plan     Age-related osteoporosis without current pathological fracture  Risk factors include age, petite frame    Previously on fosamax but on drug holiday for last 2 years with loss BMD   High fracture risk based on FRAX. Recommend parenteral treatment. Discussed Prolia and she agrees to this    Check:  - renal chem panel, PTH, Vit D 25 OH  - 24 hour urine Ca and Cr     Recommend:  Treatment: as above  Calcium:  recommended 1200 mg daily divided between diet and supplements. Written instructions provided to patient  Vitamin D: pending labs  DXA: due in 2025  Exercise: continue weight-bearing but discussed fall risk  Fall precautions: reviewed in detail  Dental health: regular visits, no planned procedures        RTC 1 year        Lindsey Kirkpatrick MD      Visit today included increased complexity associated with the care of the problems addressed and managing the longitudinal care of the patient due to the serious and/or complex managed problems

## 2024-04-04 ENCOUNTER — PATIENT MESSAGE (OUTPATIENT)
Dept: ENDOCRINOLOGY | Facility: CLINIC | Age: 84
End: 2024-04-04
Payer: MEDICARE

## 2024-04-05 ENCOUNTER — TELEPHONE (OUTPATIENT)
Dept: INFECTIOUS DISEASES | Facility: HOSPITAL | Age: 84
End: 2024-04-05
Payer: MEDICARE

## 2024-04-10 ENCOUNTER — PATIENT MESSAGE (OUTPATIENT)
Dept: ADMINISTRATIVE | Facility: OTHER | Age: 84
End: 2024-04-10
Payer: MEDICARE

## 2024-04-17 ENCOUNTER — INFUSION (OUTPATIENT)
Dept: INFECTIOUS DISEASES | Facility: HOSPITAL | Age: 84
End: 2024-04-17
Payer: MEDICARE

## 2024-04-17 VITALS
TEMPERATURE: 98 F | BODY MASS INDEX: 20.89 KG/M2 | DIASTOLIC BLOOD PRESSURE: 60 MMHG | RESPIRATION RATE: 20 BRPM | SYSTOLIC BLOOD PRESSURE: 125 MMHG | HEIGHT: 64 IN | WEIGHT: 122.38 LBS | HEART RATE: 60 BPM | OXYGEN SATURATION: 97 %

## 2024-04-17 DIAGNOSIS — M81.0 AGE-RELATED OSTEOPOROSIS WITHOUT CURRENT PATHOLOGICAL FRACTURE: Primary | ICD-10-CM

## 2024-04-17 PROCEDURE — 96372 THER/PROPH/DIAG INJ SC/IM: CPT

## 2024-04-17 PROCEDURE — 63600175 PHARM REV CODE 636 W HCPCS: Mod: JZ,JG | Performed by: INTERNAL MEDICINE

## 2024-04-17 RX ADMIN — DENOSUMAB 60 MG: 60 INJECTION SUBCUTANEOUS at 11:04

## 2024-04-17 NOTE — PROGRESS NOTES
Pt received Prolia, 1st injection, to left arm, subQ;  Pt taking Vit D/Ca+; Pt denies dental sx in last 3 months; Pt monitored for 15 mins post injection;  pt given next appt;    Limited head-to-toe assessment due to privacy issues and visit reason though the opportunity was given for patient to express any concerns

## 2024-04-23 ENCOUNTER — PATIENT MESSAGE (OUTPATIENT)
Dept: INTERNAL MEDICINE | Facility: CLINIC | Age: 84
End: 2024-04-23
Payer: MEDICARE

## 2024-05-11 ENCOUNTER — PATIENT MESSAGE (OUTPATIENT)
Dept: ADMINISTRATIVE | Facility: HOSPITAL | Age: 84
End: 2024-05-11
Payer: MEDICARE

## 2024-05-11 ENCOUNTER — PATIENT MESSAGE (OUTPATIENT)
Dept: ADMINISTRATIVE | Facility: OTHER | Age: 84
End: 2024-05-11
Payer: MEDICARE

## 2024-05-13 ENCOUNTER — PATIENT OUTREACH (OUTPATIENT)
Dept: ADMINISTRATIVE | Facility: HOSPITAL | Age: 84
End: 2024-05-13
Payer: MEDICARE

## 2024-05-13 NOTE — PROGRESS NOTES
Health Maintenance Due   Topic Date Due    RSV Vaccine (Age 60+ and Pregnant patients) (1 - 1-dose 60+ series) Never done    COVID-19 Vaccine (6 - 2023-24 season) 09/01/2023       Chart reviewed and updated.    Dania Gaytan LPN   Clinical Care Coordinator  Primary Care and Wellness

## 2024-05-23 ENCOUNTER — OFFICE VISIT (OUTPATIENT)
Dept: INTERNAL MEDICINE | Facility: CLINIC | Age: 84
End: 2024-05-23
Payer: MEDICARE

## 2024-05-23 VITALS
WEIGHT: 127.56 LBS | SYSTOLIC BLOOD PRESSURE: 116 MMHG | HEART RATE: 61 BPM | HEIGHT: 64 IN | OXYGEN SATURATION: 93 % | BODY MASS INDEX: 21.78 KG/M2 | DIASTOLIC BLOOD PRESSURE: 78 MMHG

## 2024-05-23 DIAGNOSIS — Z00.00 HEALTHCARE MAINTENANCE: ICD-10-CM

## 2024-05-23 DIAGNOSIS — R79.9 ABNORMAL FINDING OF BLOOD CHEMISTRY, UNSPECIFIED: ICD-10-CM

## 2024-05-23 DIAGNOSIS — L50.1 URTICARIA, IDIOPATHIC: ICD-10-CM

## 2024-05-23 DIAGNOSIS — G40.909 NONINTRACTABLE EPILEPSY WITHOUT STATUS EPILEPTICUS, UNSPECIFIED EPILEPSY TYPE: ICD-10-CM

## 2024-05-23 DIAGNOSIS — M81.0 AGE-RELATED OSTEOPOROSIS WITHOUT CURRENT PATHOLOGICAL FRACTURE: Primary | ICD-10-CM

## 2024-05-23 PROCEDURE — 99214 OFFICE O/P EST MOD 30 MIN: CPT | Mod: S$PBB,,, | Performed by: INTERNAL MEDICINE

## 2024-05-23 PROCEDURE — 99213 OFFICE O/P EST LOW 20 MIN: CPT | Mod: PBBFAC | Performed by: INTERNAL MEDICINE

## 2024-05-23 PROCEDURE — 99999 PR PBB SHADOW E&M-EST. PATIENT-LVL III: CPT | Mod: PBBFAC,,, | Performed by: INTERNAL MEDICINE

## 2024-05-23 NOTE — PROGRESS NOTES
"    CHIEF COMPLAINT     Chief Complaint   Patient presents with    Annual Exam       HPI     Gabriela Edwards is a 83 y.o. female w/ x of epilepsy, indiopathic urticaria  here today for     Started prolia for osteoporosis.     Still walking- came in 2nd for age group for [employer].    Idopathic Urticaria  On Dupixent, reports sx are 99% better.    Has [place] trip later this summer.    Personally Reviewed Patient's Medical, surgical, family and social hx. Changes updated in [facility].  Care Team updated in Epic    Review of Systems:  Review of Systems   Constitutional:  Negative for fatigue and fever.   Respiratory:  Negative for cough and shortness of breath.        Health Maintenance:   Reviewed with patient  Due for the following:      PHYSICAL EXAM     /78 (BP Location: Right arm, Patient Position: Sitting)   Pulse 61   Ht 5' 4" (1.626 m)   Wt 57.9 kg (127 lb 8.6 oz)   SpO2 (!) 93%   BMI 21.89 kg/m²     Gen: Well Appearing, NAD  HEENT: PERR, EOMI  Neck: FROM, no thyromegaly, no cervical adenopathy  CVD: RRR, no M/R/G  Pulm: Normal work of breathing, CTAB, no wheezing  Abd:  Soft, NT, ND non TTP, no mass  MSK: no LE edema  Neuro: A&Ox3, gait normal, speech normal  Mood; Mood normal, behavior normal, thought process linear       LABS     Labs reviewed; Notable for  Lab Results   Component Value Date    CREATININE 0.8 03/18/2024    BUN 10 03/18/2024     (L) 03/18/2024    K 4.5 03/18/2024     03/18/2024    CO2 26 03/18/2024       ASSESSMENT     1. Age-related osteoporosis without current pathological fracture        2. Nonintractable epilepsy without status epilepticus, unspecified epilepsy type        3. Urticaria, idiopathic        4. Healthcare maintenance                Plan     Gabriela Edwards is a 83 y.o. female with hx of epilepsy, indiopathic urticaria   1. Nonintractable epilepsy without status epilepticus, unspecified epilepsy type  No episodes followed by Neurology " continue Keppra 2000 mg nightly    2. Age-related osteoporosis without current pathological fracture  Fracture is healed followed by endocrinology just started Prolia    3. Urticaria, idiopathic  Followed by immunology significant improvement of symptoms on Dupixent will continue.    4. Healthcare maintenance  Discussed need for healthcare maintenance as she ages.  She is wanting to continue mammograms wanting to stop annual gyn well woman visits.    Rtc 6m with labs.    Derek Cruz MD

## 2024-06-03 DIAGNOSIS — L20.9 ATOPIC DERMATITIS, UNSPECIFIED TYPE: ICD-10-CM

## 2024-06-03 RX ORDER — DUPILUMAB 300 MG/2ML
300 INJECTION, SOLUTION SUBCUTANEOUS
Qty: 4 ML | Refills: 11 | OUTPATIENT
Start: 2024-06-03

## 2024-06-05 ENCOUNTER — OFFICE VISIT (OUTPATIENT)
Dept: OPTOMETRY | Facility: CLINIC | Age: 84
End: 2024-06-05
Payer: MEDICARE

## 2024-06-05 DIAGNOSIS — Z96.1 PSEUDOPHAKIA OF BOTH EYES: Primary | ICD-10-CM

## 2024-06-05 PROCEDURE — 92014 COMPRE OPH EXAM EST PT 1/>: CPT | Mod: S$PBB,,, | Performed by: OPTOMETRIST

## 2024-06-05 PROCEDURE — 99212 OFFICE O/P EST SF 10 MIN: CPT | Mod: PBBFAC | Performed by: OPTOMETRIST

## 2024-06-05 PROCEDURE — 99999 PR PBB SHADOW E&M-EST. PATIENT-LVL II: CPT | Mod: PBBFAC,,, | Performed by: OPTOMETRIST

## 2024-06-05 NOTE — PROGRESS NOTES
HPI    Pt is here today for routine eye exam. Patient denies pain/discomfort.   States that the eczema near her eye lashes/lids has not been bothering her   as much recently.   DLS: 6/2023 Dr. Larkin  (-)Flashes   (-)Floaters   (-)Diplopia   (-)Headaches   (+)Itching   (+)Tearing: OS constantly with allergy season  (-)Burning  (-)Dryness   (-)Photophobia  (+)Glare   (-)Blurred VA  Past Eye Sx: Cataract with IOL OU   Eye Meds:  Tobradex OU PRN                     Olopatadine OU PRN   Last edited by Pilar Larkin, OD on 6/5/2024  3:08 PM.            Assessment /Plan     For exam results, see Encounter Report.    Pseudophakia of both eyes      Monitor; pt educated on condition and visual status.    RTC in 1 year for annual eye exam unless changes noted sooner.

## 2024-06-10 ENCOUNTER — PATIENT MESSAGE (OUTPATIENT)
Dept: INTERNAL MEDICINE | Facility: CLINIC | Age: 84
End: 2024-06-10
Payer: MEDICARE

## 2024-06-10 ENCOUNTER — PATIENT MESSAGE (OUTPATIENT)
Dept: ALLERGY | Facility: CLINIC | Age: 84
End: 2024-06-10
Payer: MEDICARE

## 2024-06-10 DIAGNOSIS — L20.9 ATOPIC DERMATITIS, UNSPECIFIED TYPE: Primary | ICD-10-CM

## 2024-06-10 RX ORDER — DUPILUMAB 300 MG/2ML
300 INJECTION, SOLUTION SUBCUTANEOUS
Qty: 52 ML | Refills: 1 | Status: ACTIVE | OUTPATIENT
Start: 2024-06-10 | End: 2024-11-12

## 2024-06-10 NOTE — PROGRESS NOTES
# ATOPIC DERMATITIS     Patient of Dr. Kahn (who is out of the country) needs a few doses of Dupixent until she can be seen by him.  Dose is 300 mg every 14 days.    New order sent to [facility]   for 12 doses to get her through for her scheduled appointment later this month.

## 2024-06-18 ENCOUNTER — OFFICE VISIT (OUTPATIENT)
Dept: ALLERGY | Facility: CLINIC | Age: 84
End: 2024-06-18
Payer: MEDICARE

## 2024-06-18 VITALS
HEART RATE: 73 BPM | BODY MASS INDEX: 21.65 KG/M2 | SYSTOLIC BLOOD PRESSURE: 101 MMHG | WEIGHT: 126.13 LBS | DIASTOLIC BLOOD PRESSURE: 60 MMHG

## 2024-06-18 DIAGNOSIS — H10.10 ALLERGIC CONJUNCTIVITIS, UNSPECIFIED LATERALITY: ICD-10-CM

## 2024-06-18 DIAGNOSIS — L20.9 ATOPIC DERMATITIS, UNSPECIFIED TYPE: Primary | ICD-10-CM

## 2024-06-18 DIAGNOSIS — J30.89 ALLERGIC RHINITIS DUE TO DUST MITE: ICD-10-CM

## 2024-06-18 PROCEDURE — 99213 OFFICE O/P EST LOW 20 MIN: CPT | Mod: PBBFAC | Performed by: ALLERGY & IMMUNOLOGY

## 2024-06-18 PROCEDURE — 99999 PR PBB SHADOW E&M-EST. PATIENT-LVL III: CPT | Mod: PBBFAC,,, | Performed by: ALLERGY & IMMUNOLOGY

## 2024-06-18 PROCEDURE — 99214 OFFICE O/P EST MOD 30 MIN: CPT | Mod: S$PBB,,, | Performed by: ALLERGY & IMMUNOLOGY

## 2024-06-18 RX ORDER — FLUTICASONE PROPIONATE 50 MCG
2 SPRAY, SUSPENSION (ML) NASAL DAILY
Qty: 16 G | Refills: 11 | Status: SHIPPED | OUTPATIENT
Start: 2024-06-18

## 2024-06-18 RX ORDER — AZELASTINE HYDROCHLORIDE 0.5 MG/ML
1 SOLUTION/ DROPS OPHTHALMIC 2 TIMES DAILY
Qty: 6 ML | Refills: 6 | Status: SHIPPED | OUTPATIENT
Start: 2024-06-18 | End: 2025-06-18

## 2024-06-18 RX ORDER — LEVOCETIRIZINE DIHYDROCHLORIDE 5 MG/1
5 TABLET, FILM COATED ORAL DAILY
Qty: 30 TABLET | Refills: 11 | Status: SHIPPED | OUTPATIENT
Start: 2024-06-18 | End: 2025-06-18

## 2024-06-18 NOTE — PROGRESS NOTES
Subjective:      Patient ID: Gabriela Edwards is a 83 y.o. female.    LV 9/30/20    Chief Complaint: Follow-up  fu atopic dermatitis, on dupixent. Also w allergic rhinoconjunctivitis      History of Present Illness:     Since LV, atopic dermatitis remains well controlled on routine Dupixent. Continues to find it very helpful. Notes only very rare itching, small eczema flares. Denies SE from Dupixent.  Has had chronic conjunctivitis sx's that she reports preceded Dupixent therapy. Follows w ophthalmology.  Notes partial control of rhinitis sx's w prn levoctetirizine    Hx 2/2/23:  Pt presents for fu atopic dermatitis, on Dupixent since approx Oct '20.  Pt has noted significant improvement in atopic dermatitis since starting Dupixent. She starting noticing significant improvement within a month of starting therapy. Need for topical steroids has decreased dramatically. Will use prn topical TCN 0.1% cream on neck about once per month. Eyelid dermatitis is about the same, not worse, as it was prior to starting dupxient. Denies any side effects.  Rhinitis sx's unchanged.    Hx from 9/30/20:  Pt w hx chronic/recurrent idiopathic urticaria for approx 3+ years, as well as more recent atopic dermatitis.  Urticaria responded well to omalizumab.  Over the ~4 months though, pt has been affected more by chronic, recurrent atopic dermatitis rather than urticaria. Face, trunk and arms are most commonly affected. Poor response to topical steroids. Has needed multiple courses oral steroids for relief. Sleep often disturbed. No relief from pruritus w high dose antihistamines.  Recalls IT as child, for AR 3 times weekly 5 yrs  No asthma     Additional History:  Pt has seizure disorder, on Keppra x 20 yrs.  No changes.   Patient Active Problem List   Diagnosis    DON (conjunctival intraepithelial neoplasia)    Urticaria, idiopathic    Allergic rhinitis    Eczema    Nonintractable generalized idiopathic epilepsy without status  epilepticus    Age-related osteoporosis without current pathological fracture    Posture abnormality    Decreased range of motion of neck    Upper extremity weakness       Answers submitted by the patient for this visit:  Allergy and Asthma Questionnaire  (Submitted on 6/17/2024)  facial swelling: No  sinus pressure : No  postnasal drip: No  sneezing: Yes  runny nose: Yes  trouble swallowing: No  voice change: No  eye itching: Yes  apnea: No  choking: No  chest tightness: No  color change : No    Objective:     Vitals:    06/18/24 1623   BP: 101/60   Pulse: 73       Physical Exam  HENT:      Head: Normocephalic and atraumatic.      Right Ear: External ear normal.      Left Ear: External ear normal.      Nose: Nose normal.   Eyes:      General: No scleral icterus.     Conjunctiva/sclera: Conjunctivae normal.   Pulmonary:      Effort: Pulmonary effort is normal. No respiratory distress.   Abdominal:      General: There is no distension.   Musculoskeletal:      Cervical back: Normal range of motion.   Skin:     Findings: No erythema.   Neurological:      Mental Status: She is alert and oriented to person, place, and time.   Psychiatric:         Mood and Affect: Mood normal.         Behavior: Behavior normal.         Cognition and Memory: Memory normal.         Judgment: Judgment normal.           Results for ALFONSO DAMIAN (MRN [id number]) as of 9/9/2020 09:26   Ref. Range 8/6/2020 12:50   A. fumigatus Class Unknown CLASS 0   Altern. alternata Class Unknown CLASS 0   Alternaria alternata Latest Ref Range: <0.10 kU/L <0.10   Aspergillus Fumigatus IgE Latest Ref Range: <0.10 kU/L <0.10   Bahia Class Unknown CLASS 0   BAHIA GRASS Latest Ref Range: <0.10 kU/L <0.10   Cat Dander Latest Ref Range: <0.10 kU/L <0.10   Cat Epithelium Class Unknown CLASS 0   Hammond Class Unknown CLASS 0   Hammond IgE Latest Ref Range: <0.10 kU/L <0.10   Cockroach, IgE Unknown CLASS 1   D. farinae Latest Ref Range: <0.10 kU/L >100.00 (H)   D.  farinae Class Unknown CLASS 6   D. pteronyssinus Class Unknown CLASS 6   Dog Dander, IgE Latest Ref Range: <0.10 kU/L 0.67 (H)   Dog Dander Class Unknown CLASS 1   Marshelder Class Unknown CLASS 0/1   Marshelder IgE Latest Ref Range: <0.10 kU/L 0.26 (H)   Mite Dust Pteronyssinus IgE Latest Ref Range: <0.10 kU/L >100.00 (H)   Oak, Class Unknown CLASS 0   Pecan [place] Tree Latest Ref Range: <0.10 kU/L <0.10   Pecan, Class Unknown CLASS 0   Ragweed, Short, Class Unknown CLASS 0   Ragweed, Short, IgE Latest Ref Range: <0.10 kU/L <0.10   Jose Grass Latest Ref Range: <0.10 kU/L <0.10   Jose Grass Class Unknown CLASS 0   White Oak(Quercus alba) IgE Latest Ref Range: <0.10 kU/L <0.10   Results for ALFONSO DAMIAN (MRN [id number]) as of 9/9/2020 09:26   Ref. Range 8/6/2020 12:55   IgE Latest Ref Range: 0 - 100 IU/mL 586 (H)       Assessment:     1. Atopic dermatitis, severe--markedly improved on Dupixent   2. Allergic rhinoconjunctivitis       Plan:     Continue Dupixent 300 mg q 2 weeks.    aquaphor, vaseline to keep affected areas moisturized  Prn TCN 0.1% cream to affected areas on neck, chest, shoulders       Flonase  xyzal  optivar  Keep ophtho fu    Fu 1 year, sooner prn

## 2024-08-01 ENCOUNTER — PATIENT MESSAGE (OUTPATIENT)
Dept: ADMINISTRATIVE | Facility: OTHER | Age: 84
End: 2024-08-01
Payer: MEDICARE

## 2024-08-06 ENCOUNTER — LAB VISIT (OUTPATIENT)
Dept: LAB | Facility: HOSPITAL | Age: 84
End: 2024-08-06
Attending: INTERNAL MEDICINE
Payer: MEDICARE

## 2024-08-06 DIAGNOSIS — M81.0 AGE-RELATED OSTEOPOROSIS WITHOUT CURRENT PATHOLOGICAL FRACTURE: ICD-10-CM

## 2024-08-06 LAB
CALCIUM 24H UR-MRATE: NORMAL MG/HR (ref 4–12)
CALCIUM UR-MCNC: <2 MG/DL (ref 0–15)
CALCIUM URINE (MG/SPEC): NORMAL MG/SPEC
CREAT 24H UR-MRATE: 28.3 MG/HR (ref 40–75)
CREAT UR-MCNC: 40 MG/DL (ref 15–325)
CREATININE, URINE (MG/SPEC): 680 MG/SPEC
URINE COLLECTION DURATION: 24 HR
URINE COLLECTION DURATION: 24 HR
URINE VOLUME: 1700 ML
URINE VOLUME: 1700 ML

## 2024-08-06 PROCEDURE — 82340 ASSAY OF CALCIUM IN URINE: CPT | Performed by: INTERNAL MEDICINE

## 2024-08-06 PROCEDURE — 82570 ASSAY OF URINE CREATININE: CPT | Performed by: INTERNAL MEDICINE

## 2024-08-08 ENCOUNTER — PATIENT MESSAGE (OUTPATIENT)
Dept: ENDOCRINOLOGY | Facility: CLINIC | Age: 84
End: 2024-08-08
Payer: MEDICARE

## 2024-09-25 DIAGNOSIS — Z00.00 ENCOUNTER FOR MEDICARE ANNUAL WELLNESS EXAM: ICD-10-CM

## 2024-10-16 ENCOUNTER — OFFICE VISIT (OUTPATIENT)
Dept: INTERNAL MEDICINE | Facility: CLINIC | Age: 84
End: 2024-10-16
Payer: MEDICARE

## 2024-10-16 VITALS
SYSTOLIC BLOOD PRESSURE: 102 MMHG | WEIGHT: 125.69 LBS | HEART RATE: 60 BPM | HEIGHT: 64 IN | BODY MASS INDEX: 21.46 KG/M2 | DIASTOLIC BLOOD PRESSURE: 70 MMHG | OXYGEN SATURATION: 97 %

## 2024-10-16 DIAGNOSIS — Z00.00 ENCOUNTER FOR PREVENTIVE HEALTH EXAMINATION: Primary | ICD-10-CM

## 2024-10-16 DIAGNOSIS — G40.309 NONINTRACTABLE GENERALIZED IDIOPATHIC EPILEPSY WITHOUT STATUS EPILEPTICUS: ICD-10-CM

## 2024-10-16 DIAGNOSIS — L30.9 ECZEMA, UNSPECIFIED TYPE: ICD-10-CM

## 2024-10-16 DIAGNOSIS — M81.0 AGE-RELATED OSTEOPOROSIS WITHOUT CURRENT PATHOLOGICAL FRACTURE: ICD-10-CM

## 2024-10-16 DIAGNOSIS — L50.1 URTICARIA, IDIOPATHIC: ICD-10-CM

## 2024-10-16 PROBLEM — R29.898 DECREASED RANGE OF MOTION OF NECK: Status: RESOLVED | Noted: 2023-09-13 | Resolved: 2024-10-16

## 2024-10-16 PROBLEM — R29.3 POSTURE ABNORMALITY: Status: RESOLVED | Noted: 2023-09-13 | Resolved: 2024-10-16

## 2024-10-16 PROBLEM — R29.898 UPPER EXTREMITY WEAKNESS: Status: RESOLVED | Noted: 2023-09-13 | Resolved: 2024-10-16

## 2024-10-16 PROCEDURE — 99999 PR PBB SHADOW E&M-EST. PATIENT-LVL IV: CPT | Mod: PBBFAC,,, | Performed by: NURSE PRACTITIONER

## 2024-10-16 PROCEDURE — 99214 OFFICE O/P EST MOD 30 MIN: CPT | Mod: PBBFAC | Performed by: NURSE PRACTITIONER

## 2024-10-16 RX ORDER — CALCIUM CARBONATE 500(1250)
1 TABLET ORAL 2 TIMES DAILY
COMMUNITY

## 2024-10-16 NOTE — PATIENT INSTRUCTIONS
Counseling and Referral of Other Preventative  (Italic type indicates deductible and co-insurance are waived)    Patient Name: Gabriela Edwards  Today's Date: 10/16/2024    Health Maintenance       Date Due Completion Date    RSV Vaccine (Age 60+ and Pregnant patients) (1 - 1-dose 75+ series) Never done ---    Mammogram 04/15/2025 4/15/2024    DEXA Scan 09/25/2025 9/25/2023    Lipid Panel 11/15/2028 11/15/2023    TETANUS VACCINE 06/14/2031 6/14/2021        No orders of the defined types were placed in this encounter.    The following information is provided to all patients.  This information is to help you find resources for any of the problems found today that may be affecting your health:                  Living healthy guide: www.[facility].[place].gov      Understanding Diabetes: www.diabetes.org      Eating healthy: www.cdc.gov/healthyweight      CDC home safety checklist: www.cdc.gov/steadi/patient.html      Agency on Aging: www.goea.[place].[facility]      Alcoholics anonymous (AA): www.aa.org      Physical Activity: www.[place].nih.gov/yk9ufqd      Tobacco use: www.quitwithusla.org

## 2024-10-16 NOTE — PROGRESS NOTES
"  Gabriela Edwards presented for an initial Medicare AWV today. The following components were reviewed and updated:    Medical history  Family History  Social history  Allergies and Current Medications  Health Risk Assessment  Health Maintenance  Care Team    **See Completed Assessments for Annual Wellness visit with in the encounter summary    The following assessments were completed:  Depression Screening  Cognitive function Screening    Timed Get Up Test  Whisper Test      Opioid documentation:      Patient does not have a current opioid prescription.          Vitals:    10/16/24 1248 10/16/24 1306   BP: (!) 100/54 102/70   BP Location: Left arm Left arm   Patient Position:  Sitting   Pulse: 60    SpO2: 97%    Weight: 57 kg (125 lb 10.6 oz)    Height: 5' 4" (1.626 m)      Body mass index is 21.57 kg/m².       Physical Exam  Vitals and nursing note reviewed.   Constitutional:       Appearance: She is well-developed.   HENT:      Head: Normocephalic.   Cardiovascular:      Rate and Rhythm: Normal rate and regular rhythm.      Heart sounds: Normal heart sounds. No murmur heard.  Pulmonary:      Effort: Pulmonary effort is normal.      Breath sounds: Normal breath sounds.   Abdominal:      General: Bowel sounds are normal.      Palpations: Abdomen is soft.   Musculoskeletal:         General: Normal range of motion.   Skin:     General: Skin is warm and dry.   Neurological:      Mental Status: She is alert and oriented to person, place, and time.      Motor: No abnormal muscle tone.   Psychiatric:         Mood and Affect: Mood normal.            Diagnoses and health risks identified today and associated recommendations/orders:    1. Encounter for preventive health examination  Here for Health Risk Assessment/Annual Wellness Visit.  Health maintenance reviewed and updated. Follow up in one year.   Discussed RSV vaccine.    2. Nonintractable generalized idiopathic epilepsy without status epilepticus  Chronic, stable on " current medication. Reports no seizure activity x 3 years. Followed by PCP, Neurology.     3. Age-related osteoporosis without current pathological fracture  Chronic, stable on current medications/Prolia. Followed by PCP, Endocrinology.     4. Urticaria, idiopathic  Chronic, stable on current medication. Followed by PCP, Allergy.     5. Eczema, unspecified type  Chronic, stable on current medication. Followed by PCP, Allergy.       Provided Gabriela with a 5-10 year written screening schedule and personal prevention plan. Recommendations were developed using the USPSTF age appropriate recommendations. Education, counseling, and referrals were provided as needed.  After Visit Summary printed and given to patient which includes a list of additional screenings\tests needed.    Follow up in about 7 months (around 5/25/2025).with PCP      Cat Delgadillo NP

## 2024-10-18 ENCOUNTER — INFUSION (OUTPATIENT)
Dept: INFECTIOUS DISEASES | Facility: HOSPITAL | Age: 84
End: 2024-10-18
Payer: MEDICARE

## 2024-10-18 VITALS
SYSTOLIC BLOOD PRESSURE: 104 MMHG | TEMPERATURE: 98 F | WEIGHT: 127.31 LBS | DIASTOLIC BLOOD PRESSURE: 66 MMHG | BODY MASS INDEX: 21.74 KG/M2 | RESPIRATION RATE: 18 BRPM | OXYGEN SATURATION: 96 % | HEART RATE: 71 BPM | HEIGHT: 64 IN

## 2024-10-18 DIAGNOSIS — M81.0 AGE-RELATED OSTEOPOROSIS WITHOUT CURRENT PATHOLOGICAL FRACTURE: Primary | ICD-10-CM

## 2024-10-18 PROCEDURE — 96372 THER/PROPH/DIAG INJ SC/IM: CPT

## 2024-10-18 PROCEDURE — 63600175 PHARM REV CODE 636 W HCPCS: Mod: JZ,JG | Performed by: INTERNAL MEDICINE

## 2024-10-18 RX ADMIN — DENOSUMAB 60 MG: 60 INJECTION SUBCUTANEOUS at 10:10

## 2024-10-18 NOTE — PROGRESS NOTES
Patient received Prolia injection - confirms use of calcium and vitamin D supplements and denies dental procedures over past 3 months - administered per guidelines. Patient tolerated injection well, without difficulty.    Next appointment scheduled and given to patient.    Limited head-to-toe assessment due to privacy issues and visit reason though the opportunity was given for patient to express any concerns.

## 2024-10-24 ENCOUNTER — PATIENT MESSAGE (OUTPATIENT)
Dept: ADMINISTRATIVE | Facility: OTHER | Age: 84
End: 2024-10-24
Payer: MEDICARE

## 2024-11-19 DIAGNOSIS — G40.309 NONINTRACTABLE GENERALIZED IDIOPATHIC EPILEPSY WITHOUT STATUS EPILEPTICUS: ICD-10-CM

## 2024-11-19 RX ORDER — LEVETIRACETAM 500 MG/1
2000 TABLET, FILM COATED, EXTENDED RELEASE ORAL NIGHTLY
Qty: 360 TABLET | Refills: 3 | Status: SHIPPED | OUTPATIENT
Start: 2024-11-19

## 2024-11-20 ENCOUNTER — PATIENT MESSAGE (OUTPATIENT)
Dept: ADMINISTRATIVE | Facility: OTHER | Age: 84
End: 2024-11-20
Payer: MEDICARE

## 2024-12-13 NOTE — PROGRESS NOTES
[facility]  Epilepsy Clinic Progress Note      [facility] - NEUROLOGY 7TH FL  [facility], [place]    Date: 12/16/24  Patient Name: Gabriela Edwards   MRN: [id number]   PCP: Derek Cruz  Referring Provider: No ref. provider found    Assessment:     83 yo female with well controlled cryptogenic epilepsy.  Last seizure over 5 years ago; has only ever had 1 GTC.  Continue Keppra brand name only 2000 mg XR daily.  Levels today.  RTC 1 year.    Plan:     Problem List Items Addressed This Visit          Neuro    Nonintractable generalized idiopathic epilepsy without status epilepticus - Primary    Overview     Dx: 2000  Followed by Dr. Saravanan Perez  Last seizure: 2020         Relevant Orders    Levetiracetam level       I completed education on seizure first aid and safety. I recommended seizure precautions with regards to avoiding unsupervised water recreational activity or bathing in tubs, climbing or working at heights, operation of heavy or dangerous machinery, caution around fire and sources of high heat, as well as any other activity which could put a patient at danger in case of a seizure.  I also reviewed the [place] DMV law and recommended that patient can drive due to well controlled epilepsy.    Shannon Peres MD  [facility]   Department of Neurology    Patient note was created using MModal Dictation.  Any errors in syntax or even information may not have been identified and edited on initial review prior to signing this note.  Subjective:          Ms. Gabriela Edwards is a 84 y.o. female returns to clinic for continued management of epilepsy.     Interval history:  First visit with myself, previously saw Dr Cintron, for prior notes see below.    Patient reports that she had a GTC 20 years ago, but has only ever had one.  Otherwise, she has had focal seizures characterized by loss of awareness.  Last time was over 5 years ago.  She used to practice law  and did notice episodes of lost awareness at work but thought it was initially due to stress/working long hours.  Was finally diagnosed with epilepsy after her GTC.  There is a family history of epilepsy in her great uncle, but no first degree relatives with epilepsy.      Interval Events/ROS 2023:     Current ASM/SEs: Keppra (brand name only) extended release 500 mg tablets -> 2500ER nightly SE no issues  Breakthrough seizures/events: none  Driving: yes  Sleep: good  Mood: good     6-8 months ago started experiencing episodes of vertigo that last 2 minutes or less, aggravated by certain position changes. Tried treating w/ head movement maneuvers she found online with great success, has not experienced vertigo for the past week. Also reports daily head pressure sensation - towards the end of every day her head begins to feel very heavy and she will feel pressure to base of head that is relieved by laying down and applying support/pressure to base of head/back of neck. Otherwise, no fever, no cold symptoms, no changes in vision, no new weakness, no chest pain, no shortness of breath, no nausea, no vomiting, no diarrhea, no constipation, no tingling/numbness, no problems walking.         Recent Labs   Lab 22  1402   Levetiracetam Lvl 37.7      HPI 2022:      Age of first seizure: 2004, 63yo   Handedness: right   Seizure Risk Factors: maternal uncle with seizures (details unclear), no head strike with LOC, no CNS infections,  term with no prolonged hospitalization   Time of Last Seizure:    # of lifetime Seizures: 1 GTC, 8 other behavior arrest seizures   Frequency of Seizures: at most 1 in a day, every couple of years   Seizure Triggers: no idea   Injuries/Hospitalization for seizures? No injury, ED -> admitted after the GTC ()   Driving? Yes   Bone Health: Osteoporosis, previously on treatment with Fosamax  Mood: excellent      Auras: no warning, out of the blue      Seizure Events:   1.  Generalized convulsions x1, foaming at the mouth, no urinary incotinence, no tongue bite, postictal confusion for about an hour   2. Staring forward, loss of awareness, 10s     Current AED/SEs:  1. Keppra (brand name only) extended release 500 mg tablets -> 2500ER nightly SE no issues      Previous AED/SEs or reason for DC.   Generic Keppra -> had another staring episode, needs brand name only medication      EEG: yes in the past, with abnormalities, but no reports or tracing avaialbe for review   MRI: for hearing loss, IAC after a mass was removed from her ear, no abnormalities      Other Allergies:  Dust mites     AED compliance, adherence: no missed doses     PAST MEDICAL HISTORY:  Past Medical History:   Diagnosis Date    Cataract     Choroidal nevus     Eczema     Encounter for blood transfusion     Epilepsy     H/O right wrist surgery 2009    Nuclear sclerosis, bilateral 8/19/2019    Nuclear sclerotic cataract of left eye 9/30/2019    Transfusion reaction     h/o hepatitis that was treated     Urticaria        PAST SURGICAL HISTORY:  Past Surgical History:   Procedure Laterality Date    CATARACT EXTRACTION W/  INTRAOCULAR LENS IMPLANT Right 08/19/2019    Dr. Freeman    CATARACT EXTRACTION W/  INTRAOCULAR LENS IMPLANT Right 08/19/2019    Procedure: EXTRACTION, CATARACT, WITH IOL INSERTION;  Surgeon: Maryuri Freeman MD;  Location: [facility];  Service: Ophthalmology;  Laterality: Right;  Laser    CATARACT EXTRACTION W/  INTRAOCULAR LENS IMPLANT Left 09/30/2019    Procedure: EXTRACTION, CATARACT, WITH IOL INSERTION;  Surgeon: Maryuri Freeman MD;  Location: [facility];  Service: Ophthalmology;  Laterality: Left;  LASER ASSISTED    NOSE SURGERY      WRIST SURGERY Right     2009       CURRENT MEDS:  Current Outpatient Medications   Medication Sig Dispense Refill    azelastine (OPTIVAR) 0.05 % ophthalmic solution Place 1 drop into both eyes 2 (two) times daily. 6 mL 6    calcium carbonate (OS-KAREN) 500 mg calcium (1,250 mg)  "tablet Take 1 tablet by mouth 2 (two) times daily.      dupilumab (DUPIXENT SYRINGE) 300 mg/2 mL Syrg Inject 2 mLs (1 syringe) into the skin every 14 (fourteen) days. 4 mL 11    dupilumab (DUPIXENT SYRINGE) 300 mg/2 mL Syrg Inject 2 mLs (300 mg total) into the skin every 14 (fourteen) days. for 12 doses 52 mL 1    fluticasone propionate (FLONASE) 50 mcg/actuation nasal spray 2 sprays (100 mcg total) by Each Nostril route once daily. 16 g 11    KEPPRA  mg Tb24 24 hr tablet Take 4 tablets (2,000 mg total) by mouth every evening. 360 tablet 3    levocetirizine (XYZAL) 5 MG tablet Take 1 tablet (5 mg total) by mouth once daily. 30 tablet 11    olopatadine (PATANOL) 0.1 % ophthalmic solution INSTILL 1 DROP IN BOTH EYES TWICE DAILY AS NEEDED 5 mL 6     No current facility-administered medications for this visit.       ALLERGIES:  Review of patient's allergies indicates:   Allergen Reactions    House dust mite Hives, Itching, Rash and Swelling       FAMILY HISTORY:  Family History   Problem Relation Name Age of Onset    Cancer Brother      Asthma Mother      Amblyopia Neg Hx      Blindness Neg Hx      Cataracts Neg Hx      Glaucoma Neg Hx      Macular degeneration Neg Hx      Retinal detachment Neg Hx      Strabismus Neg Hx      Melanoma Neg Hx      Psoriasis Neg Hx      Lupus Neg Hx      Colon cancer Neg Hx      Esophageal cancer Neg Hx         SOCIAL HISTORY:  Social History     Tobacco Use    Smoking status: Never     Passive exposure: Never    Smokeless tobacco: Never   Substance Use Topics    Alcohol use: Yes     Alcohol/week: 1.0 standard drink of alcohol     Types: 1 Glasses of wine per week     Comment: 1 glass of wine a day    Drug use: No       Review of Systems:  12 system review of systems is negative except for the symptoms mentioned in HPI.      Objective:     Vitals:    12/16/24 1036   BP: 115/74   Pulse: (!) 55   Weight: 58 kg (127 lb 15.6 oz)   Height: 5' 4" (1.626 m)     General: NAD, well nourished "   Eyes: no tearing, discharge, no erythema   ENT: moist mucous membranes of the oral cavity, nares patent    Neck: Supple, full range of motion  Cardiovascular: Warm and well perfused, pulses equal and symmetrical  Lungs: Normal work of breathing, normal chest wall excursions  Skin: No rash, lesions, or breakdown on exposed skin  Psychiatry: Mood and affect are appropriate   Abdomen: soft, non tender, non distended  Extremeties: No cyanosis, clubbing or edema.    Neurological   MENTAL STATUS: Alert and oriented to person, place, and time. Attention and concentration within normal limits. Speech without dysarthria, able to name and repeat without difficulty. Recent and remote memory within normal limits   CRANIAL NERVES: Visual fields intact. PERRL. EOMI. Facial sensation intact. Face symmetrical. Hearing grossly intact. Full shoulder shrug bilaterally. Tongue protrudes midline   SENSORY: Sensation is intact to light touch throughout.  Joint position perception intact. Negative Romberg.   MOTOR: Normal bulk and tone. No pronator drift.  5/5 deltoid, biceps, triceps, interosseous, hand  bilaterally. 5/5 iliopsoas, knee extension/flexion, foot dorsi/plantarflexion bilaterally.    REFLEXES: Symmetric and 2+ throughout. Toes down going bilaterally.   CEREBELLAR/COORDINATION/GAIT: Gait steady with normal arm swing and stride length.  Heel to shin intact. Finger to nose intact. Normal rapid alternating movements.

## 2024-12-16 ENCOUNTER — LAB VISIT (OUTPATIENT)
Dept: LAB | Facility: HOSPITAL | Age: 84
End: 2024-12-16
Attending: STUDENT IN AN ORGANIZED HEALTH CARE EDUCATION/TRAINING PROGRAM
Payer: MEDICARE

## 2024-12-16 ENCOUNTER — OFFICE VISIT (OUTPATIENT)
Dept: NEUROLOGY | Facility: CLINIC | Age: 84
End: 2024-12-16
Payer: MEDICARE

## 2024-12-16 VITALS
BODY MASS INDEX: 21.85 KG/M2 | HEIGHT: 64 IN | HEART RATE: 55 BPM | SYSTOLIC BLOOD PRESSURE: 115 MMHG | WEIGHT: 128 LBS | DIASTOLIC BLOOD PRESSURE: 74 MMHG

## 2024-12-16 DIAGNOSIS — G40.309 NONINTRACTABLE GENERALIZED IDIOPATHIC EPILEPSY WITHOUT STATUS EPILEPTICUS: Primary | ICD-10-CM

## 2024-12-16 DIAGNOSIS — G40.309 NONINTRACTABLE GENERALIZED IDIOPATHIC EPILEPSY WITHOUT STATUS EPILEPTICUS: ICD-10-CM

## 2024-12-16 PROCEDURE — 99213 OFFICE O/P EST LOW 20 MIN: CPT | Mod: PBBFAC | Performed by: STUDENT IN AN ORGANIZED HEALTH CARE EDUCATION/TRAINING PROGRAM

## 2024-12-16 PROCEDURE — 36415 COLL VENOUS BLD VENIPUNCTURE: CPT | Performed by: STUDENT IN AN ORGANIZED HEALTH CARE EDUCATION/TRAINING PROGRAM

## 2024-12-16 PROCEDURE — 80177 DRUG SCRN QUAN LEVETIRACETAM: CPT | Performed by: STUDENT IN AN ORGANIZED HEALTH CARE EDUCATION/TRAINING PROGRAM

## 2024-12-16 PROCEDURE — 99213 OFFICE O/P EST LOW 20 MIN: CPT | Mod: S$PBB,,, | Performed by: STUDENT IN AN ORGANIZED HEALTH CARE EDUCATION/TRAINING PROGRAM

## 2024-12-16 PROCEDURE — 99999 PR PBB SHADOW E&M-EST. PATIENT-LVL III: CPT | Mod: PBBFAC,,, | Performed by: STUDENT IN AN ORGANIZED HEALTH CARE EDUCATION/TRAINING PROGRAM

## 2024-12-17 ENCOUNTER — PATIENT MESSAGE (OUTPATIENT)
Dept: ALLERGY | Facility: CLINIC | Age: 84
End: 2024-12-17
Payer: MEDICARE

## 2024-12-17 ENCOUNTER — PATIENT MESSAGE (OUTPATIENT)
Dept: ADMINISTRATIVE | Facility: OTHER | Age: 84
End: 2024-12-17
Payer: MEDICARE

## 2024-12-17 DIAGNOSIS — L23.9 ALLERGIC CONTACT DERMATITIS, UNSPECIFIED TRIGGER: ICD-10-CM

## 2024-12-18 RX ORDER — TOBRAMYCIN AND DEXAMETHASONE 3; 1 MG/G; MG/G
OINTMENT OPHTHALMIC
Qty: 3.5 G | Refills: 0 | Status: SHIPPED | OUTPATIENT
Start: 2024-12-18

## 2024-12-19 LAB — LEVETIRACETAM SERPL-MCNC: 46.2 UG/ML (ref 3–60)

## 2024-12-20 ENCOUNTER — PATIENT MESSAGE (OUTPATIENT)
Dept: ADMINISTRATIVE | Facility: OTHER | Age: 84
End: 2024-12-20
Payer: MEDICARE

## 2024-12-23 DIAGNOSIS — L23.9 ALLERGIC CONTACT DERMATITIS, UNSPECIFIED TRIGGER: ICD-10-CM

## 2025-01-14 RX ORDER — OLOPATADINE HYDROCHLORIDE 1 MG/ML
SOLUTION/ DROPS OPHTHALMIC
Qty: 5 ML | Refills: 6 | Status: SHIPPED | OUTPATIENT
Start: 2025-01-14 | End: 2025-01-27 | Stop reason: SDUPTHER

## 2025-01-16 ENCOUNTER — TELEPHONE (OUTPATIENT)
Dept: OPTOMETRY | Facility: CLINIC | Age: 85
End: 2025-01-16
Payer: MEDICARE

## 2025-01-21 ENCOUNTER — PATIENT MESSAGE (OUTPATIENT)
Dept: OPHTHALMOLOGY | Facility: CLINIC | Age: 85
End: 2025-01-21
Payer: MEDICARE

## 2025-01-21 ENCOUNTER — PATIENT MESSAGE (OUTPATIENT)
Dept: ADMINISTRATIVE | Facility: OTHER | Age: 85
End: 2025-01-21
Payer: MEDICARE

## 2025-01-21 ENCOUNTER — TELEPHONE (OUTPATIENT)
Dept: OPTOMETRY | Facility: CLINIC | Age: 85
End: 2025-01-21
Payer: MEDICARE

## 2025-01-24 ENCOUNTER — PATIENT MESSAGE (OUTPATIENT)
Dept: OPTOMETRY | Facility: CLINIC | Age: 85
End: 2025-01-24
Payer: MEDICARE

## 2025-01-24 ENCOUNTER — TELEPHONE (OUTPATIENT)
Dept: OPTOMETRY | Facility: CLINIC | Age: 85
End: 2025-01-24
Payer: MEDICARE

## 2025-01-27 ENCOUNTER — OFFICE VISIT (OUTPATIENT)
Dept: OPTOMETRY | Facility: CLINIC | Age: 85
End: 2025-01-27
Payer: MEDICARE

## 2025-01-27 DIAGNOSIS — H10.13 ALLERGIC CONJUNCTIVITIS OF BOTH EYES: Primary | ICD-10-CM

## 2025-01-27 DIAGNOSIS — L23.9 ALLERGIC CONTACT DERMATITIS, UNSPECIFIED TRIGGER: ICD-10-CM

## 2025-01-27 PROCEDURE — G2211 COMPLEX E/M VISIT ADD ON: HCPCS | Mod: S$PBB,,, | Performed by: OPTOMETRIST

## 2025-01-27 PROCEDURE — 99999 PR PBB SHADOW E&M-EST. PATIENT-LVL II: CPT | Mod: PBBFAC,,, | Performed by: OPTOMETRIST

## 2025-01-27 PROCEDURE — 99212 OFFICE O/P EST SF 10 MIN: CPT | Mod: PBBFAC | Performed by: OPTOMETRIST

## 2025-01-27 PROCEDURE — 99213 OFFICE O/P EST LOW 20 MIN: CPT | Mod: S$PBB,,, | Performed by: OPTOMETRIST

## 2025-01-27 RX ORDER — TOBRAMYCIN AND DEXAMETHASONE 3; 1 MG/G; MG/G
OINTMENT OPHTHALMIC
Qty: 3.5 G | Refills: 0 | Status: SHIPPED | OUTPATIENT
Start: 2025-01-27

## 2025-01-27 RX ORDER — OLOPATADINE HYDROCHLORIDE 1 MG/ML
1 SOLUTION/ DROPS OPHTHALMIC 2 TIMES DAILY
Qty: 5 ML | Refills: 6 | Status: SHIPPED | OUTPATIENT
Start: 2025-01-27 | End: 2026-01-27

## 2025-01-27 NOTE — PROGRESS NOTES
HPI    Eye Meds: Olopatadine BID OU, Tobradex PRN OU for flare ups  Pt states having psoriasis on her eyelids and suffering from sever   itchiness.   Pt was being managed by immunologist but he can no longer keep prescribing   ophthalmic medications.  Pt states the above medications help with her symptoms and would to   continue using them.  Pt also receives dupixent injection for her allergies. *History of ocular   DON  Last edited by Pilar Larkin, OD on 1/27/2025  3:59 PM.            Assessment /Plan     For exam results, see Encounter Report.    Allergic conjunctivitis of both eyes  -     olopatadine (PATANOL) 0.1 % ophthalmic solution; Place 1 drop into both eyes 2 (two) times daily.  Dispense: 5 mL; Refill: 6    Allergic contact dermatitis, unspecified trigger  -     tobramycin-dexAMETHasone 0.3-0.1% (TOBRADEX) 0.3-0.1 % Oint; INSTILL 1/2 INCH RIBBON IN LOWER EYELID OF BOTH EYES EVERY EVENING  Dispense: 3.5 g; Refill: 0      Allergies followed by Dr. Kahn--treated with Dupilumab.  Continue with Patanol gtts/Tobradex ginger as needed during eye/adnexa flare-ups.    Today's visit is associated with current and anticipated ongoing medical care related to this patient's single serious/complex condition (conjunctivitis/dermatitis). Follow up is to be continued indefinitely to monitor the condition.     RTC x 6 months for annual unless changes noted sooner.

## 2025-02-17 ENCOUNTER — PATIENT MESSAGE (OUTPATIENT)
Dept: INTERNAL MEDICINE | Facility: CLINIC | Age: 85
End: 2025-02-17
Payer: MEDICARE

## 2025-02-17 ENCOUNTER — PATIENT MESSAGE (OUTPATIENT)
Dept: ADMINISTRATIVE | Facility: OTHER | Age: 85
End: 2025-02-17
Payer: MEDICARE

## 2025-02-17 DIAGNOSIS — Z12.31 ENCOUNTER FOR SCREENING MAMMOGRAM FOR MALIGNANT NEOPLASM OF BREAST: Primary | ICD-10-CM

## 2025-04-14 ENCOUNTER — PATIENT MESSAGE (OUTPATIENT)
Dept: ADMINISTRATIVE | Facility: OTHER | Age: 85
End: 2025-04-14
Payer: MEDICARE

## 2025-04-14 ENCOUNTER — PATIENT MESSAGE (OUTPATIENT)
Dept: ENDOCRINOLOGY | Facility: CLINIC | Age: 85
End: 2025-04-14
Payer: MEDICARE

## 2025-04-14 DIAGNOSIS — M81.0 AGE-RELATED OSTEOPOROSIS WITHOUT CURRENT PATHOLOGICAL FRACTURE: Primary | ICD-10-CM

## 2025-04-21 ENCOUNTER — HOSPITAL ENCOUNTER (OUTPATIENT)
Dept: RADIOLOGY | Facility: OTHER | Age: 85
Discharge: HOME | End: 2025-04-21
Attending: INTERNAL MEDICINE
Payer: MEDICARE

## 2025-04-21 DIAGNOSIS — Z12.31 ENCOUNTER FOR SCREENING MAMMOGRAM FOR MALIGNANT NEOPLASM OF BREAST: ICD-10-CM

## 2025-04-21 PROCEDURE — 77067 SCR MAMMO BI INCL CAD: CPT | Mod: 26,,, | Performed by: RADIOLOGY

## 2025-04-21 PROCEDURE — 77063 BREAST TOMOSYNTHESIS BI: CPT | Mod: TC

## 2025-04-21 PROCEDURE — 77063 BREAST TOMOSYNTHESIS BI: CPT | Mod: 26,,, | Performed by: RADIOLOGY

## 2025-04-22 ENCOUNTER — INFUSION (OUTPATIENT)
Dept: INFECTIOUS DISEASES | Facility: HOSPITAL | Age: 85
End: 2025-04-22
Payer: MEDICARE

## 2025-04-22 VITALS
RESPIRATION RATE: 18 BRPM | HEIGHT: 64 IN | WEIGHT: 127.69 LBS | SYSTOLIC BLOOD PRESSURE: 126 MMHG | DIASTOLIC BLOOD PRESSURE: 60 MMHG | TEMPERATURE: 98 F | BODY MASS INDEX: 21.8 KG/M2 | HEART RATE: 55 BPM | OXYGEN SATURATION: 96 %

## 2025-04-22 DIAGNOSIS — M81.0 AGE-RELATED OSTEOPOROSIS WITHOUT CURRENT PATHOLOGICAL FRACTURE: Primary | ICD-10-CM

## 2025-04-22 PROCEDURE — 63600175 PHARM REV CODE 636 W HCPCS: Mod: JZ,TB | Performed by: INTERNAL MEDICINE

## 2025-04-22 PROCEDURE — 96372 THER/PROPH/DIAG INJ SC/IM: CPT

## 2025-04-22 RX ORDER — DENOSUMAB 60 MG/ML
60 INJECTION SUBCUTANEOUS
COMMUNITY

## 2025-04-22 RX ADMIN — DENOSUMAB 60 MG: 60 INJECTION SUBCUTANEOUS at 11:04

## 2025-04-30 ENCOUNTER — OFFICE VISIT (OUTPATIENT)
Dept: ENDOCRINOLOGY | Facility: CLINIC | Age: 85
End: 2025-04-30
Payer: MEDICARE

## 2025-04-30 DIAGNOSIS — E55.9 VITAMIN D DEFICIENCY: ICD-10-CM

## 2025-04-30 DIAGNOSIS — M81.0 AGE-RELATED OSTEOPOROSIS WITHOUT CURRENT PATHOLOGICAL FRACTURE: Primary | ICD-10-CM

## 2025-04-30 PROCEDURE — 98006 SYNCH AUDIO-VIDEO EST MOD 30: CPT | Mod: 95,,, | Performed by: INTERNAL MEDICINE

## 2025-04-30 PROCEDURE — 99212 OFFICE O/P EST SF 10 MIN: CPT | Performed by: INTERNAL MEDICINE

## 2025-04-30 PROCEDURE — G2211 COMPLEX E/M VISIT ADD ON: HCPCS | Mod: 95,,, | Performed by: INTERNAL MEDICINE

## 2025-04-30 NOTE — PROGRESS NOTES
Gabriela Edwards is a 84 y.o. female presenting for follow-up of osteoporosis    The patient location is: home in [place]  The chief complaint leading to consultation is:   Chief Complaint   Patient presents with    Osteoporosis       Visit type: audiovisual    Face to Face time with patient: 7 minutes  12 minutes of total time spent on the encounter, which includes face to face time and non-face to face time preparing to see the patient (eg, review of tests), Obtaining and/or reviewing separately obtained history, Documenting clinical information in the electronic or other health record, Independently interpreting results (not separately reported) and communicating results to the patient/family/caregiver, or Care coordination (not separately reported).    Each patient to whom he or she provides medical services by telemedicine is:  (1) informed of the relationship between the physician and patient and the respective role of any other health care provider with respect to management of the patient; and (2) notified that he or she may decline to receive medical services by telemedicine and may withdraw from such care at any time.      History of Present Illness  Osteoporosis/osteopenia diagnosed several years ago outside the system  She was on treatment with fosamax (unclear duration, maybe 5 years) but then had loss of BMD when drug holiday started so sent to discuss options. Started on prolia    Treatment history:  Fosamax on drug holiday since 2021, thinks about 5 years of treatment  Prolia 4/2024-current. Tolerating well    Fractures:  wrist fracture after falling down a flight of stairs about 15 years ago    Denies falls or fractures in last year    Walks a few miles weekly  Tries to avoid climbing on ladders etc. Has family come help her    DXA 9/2023:  Lumbar spine (L1-L4):               T-score is -1.6, and Z-score is +1.1.   Compared with previous DXA, BMD at the lumbar spine has declined by 4.4%.   Femoral  neck:                          T-score is -2.4, and Z-score is 0.0.   Total hip:                                T-score is -1.4, and Z-score is +0.7.   Compared with previous DXA, BMD at the total hip has declined by 4.9%.        Fracture Risk (FRAX)   23% risk of a major osteoporotic fracture in the next 10 years.   7.4% risk of hip fracture in the next 10 years.     Impression:   *Osteoporosis based on T-score between -1.0 and -2.5 and elevated risk based on FRAX  *Fracture risk is very high due to calculated 10 year risk of hip fracture >4.5% (FRAX).     RECOMMENDATIONS:  *Daily calcium intake 1264-9705 mg, dietary sources preferred; Vitamin D 0606-9789 IU daily.  *Weight bearing exercise and fall precautions.  *Given very high fracture risk, would consider anabolic agents (including teriparatide, abaloparatide, or romosozumab), denosumab or zoledronic acid as the preferred treatment options. Oral bisphosphonates can be considered as second-line therapy.  *Repeat BMD in 2 years.       Dental visits: regular visits, no planned procedures    Diet:   Calcium intake: quart of milk a week, salmon, leafy greens    Taking citracal daily      Menopause: 50's  HRT history:maybe a year    Glucocorticoid History: denies  Personal history of kidney stones: denies  Family history of bone disease or fracture: denies known    Maybe half inch of height loss        Current Outpatient Medications:     azelastine (OPTIVAR) 0.05 % ophthalmic solution, Place 1 drop into both eyes 2 (two) times daily., Disp: 6 mL, Rfl: 6    calcium carbonate (OS-KAREN) 500 mg calcium (1,250 mg) tablet, Take 1 tablet by mouth 2 (two) times daily., Disp: , Rfl:     denosumab (PROLIA) 60 mg/mL Syrg, Inject 60 mg into the skin every 6 (six) months., Disp: , Rfl:     dupilumab (DUPIXENT SYRINGE) 300 mg/2 mL Syrg, Inject 2 mLs (1 syringe) into the skin every 14 (fourteen) days., Disp: 4 mL, Rfl: 11    dupilumab (DUPIXENT SYRINGE) 300 mg/2 mL Syrg, Inject 2  mLs (300 mg total) into the skin every 14 (fourteen) days. for 12 doses, Disp: 52 mL, Rfl: 1    fluticasone propionate (FLONASE) 50 mcg/actuation nasal spray, 2 sprays (100 mcg total) by Each Nostril route once daily., Disp: 16 g, Rfl: 11    KEPPRA  mg Tb24 24 hr tablet, Take 4 tablets (2,000 mg total) by mouth every evening., Disp: 360 tablet, Rfl: 3    levocetirizine (XYZAL) 5 MG tablet, Take 1 tablet (5 mg total) by mouth once daily., Disp: 30 tablet, Rfl: 11    olopatadine (PATANOL) 0.1 % ophthalmic solution, Place 1 drop into both eyes 2 (two) times daily., Disp: 5 mL, Rfl: 6    tobramycin-dexAMETHasone 0.3-0.1% (TOBRADEX) 0.3-0.1 % Oint, INSTILL 1/2 INCH RIBBON IN LOWER EYELID OF BOTH EYES EVERY EVENING, Disp: 3.5 g, Rfl: 0    ROS as above    Objective:     There were no vitals filed for this visit.    Wt Readings from Last 3 Encounters:   04/22/25 1110 57.9 kg (127 lb 11.4 oz)   12/16/24 1036 58 kg (127 lb 15.6 oz)   10/18/24 1030 57.7 kg (127 lb 5.1 oz)     There is no height or weight on file to calculate BMI.      Labs    Chemistry        Component Value Date/Time     (L) 03/18/2024 1217    K 4.5 03/18/2024 1217     03/18/2024 1217    CO2 26 03/18/2024 1217    BUN 10 03/18/2024 1217    CREATININE 0.8 03/18/2024 1217    GLU 87 03/18/2024 1217        Component Value Date/Time    CALCIUM 9.9 03/18/2024 1217    ALKPHOS 55 11/15/2023 0756    AST 21 11/15/2023 0756    ALT 13 11/15/2023 0756    BILITOT 0.5 11/15/2023 0756    ESTGFRAFRICA >60.0 05/26/2022 1517    EGFRNONAA >60.0 05/26/2022 1517        Lab Results   Component Value Date    PTH 50.3 03/18/2024    [id number] 24 (L) 03/18/2024    CALCIUM 9.9 03/18/2024    CALCIUM 10.0 11/15/2023    CALCIUM 10.3 10/02/2023    PHOS 3.6 03/18/2024    ALKPHOS 55 11/15/2023    ALKPHOS 50 (L) 10/02/2023    ALKPHOS 49 (L) 08/23/2023    TSH 2.789 03/18/2022    TTGIGA 13 08/25/2022    LABCALC <2.0 08/06/2024    [id number] Unable to calculate 08/06/2024              Assessment and Plan     Age-related osteoporosis without current pathological fracture  Risk factors include age, petite frame    Previously on fosamax but on drug holiday for last 2 years with loss BMD. Now on Prolia and doing well  Reassuring not fractures. She is being more mindful of fall risk  Will continue treatment with Prolia    Due for updated DXA and labs in September    Recommend:  Treatment: as above  Calcium:  recommended 1200 mg daily divided between diet and supplements. Written instructions provided to patient  Vitamin D: update with next labs  DXA: due in 9/2025  Exercise: continue weight-bearing but discussed fall risk  Fall precautions: reviewed in detail  Dental health: regular visits, no planned procedures      Vitamin D deficiency  Now taking consistently  Update level next labs        RTC 1 year        Lindsey Kirkpatrick MD      Visit today included increased complexity associated with the care of the problems addressed and managing the longitudinal care of the patient due to the serious and/or complex managed problems

## 2025-04-30 NOTE — ASSESSMENT & PLAN NOTE
Risk factors include age, petite frame    Previously on fosamax but on drug holiday for last 2 years with loss BMD. Now on Prolia and doing well  Reassuring not fractures. She is being more mindful of fall risk  Will continue treatment with Prolia    Due for updated DXA and labs in September    Recommend:  Treatment: as above  Calcium:  recommended 1200 mg daily divided between diet and supplements. Written instructions provided to patient  Vitamin D: update with next labs  DXA: due in 9/2025  Exercise: continue weight-bearing but discussed fall risk  Fall precautions: reviewed in detail  Dental health: regular visits, no planned procedures

## 2025-05-12 ENCOUNTER — PATIENT MESSAGE (OUTPATIENT)
Dept: ADMINISTRATIVE | Facility: OTHER | Age: 85
End: 2025-05-12
Payer: MEDICARE

## 2025-05-21 RX ORDER — FLUTICASONE PROPIONATE 50 MCG
SPRAY, SUSPENSION (ML) NASAL
Qty: 16 G | Refills: 11 | Status: SHIPPED | OUTPATIENT
Start: 2025-05-21

## 2025-06-04 ENCOUNTER — PATIENT MESSAGE (OUTPATIENT)
Dept: ALLERGY | Facility: CLINIC | Age: 85
End: 2025-06-04
Payer: MEDICARE

## 2025-06-04 ENCOUNTER — OFFICE VISIT (OUTPATIENT)
Dept: ALLERGY | Facility: CLINIC | Age: 85
End: 2025-06-04
Payer: MEDICARE

## 2025-06-04 DIAGNOSIS — L20.9 ATOPIC DERMATITIS, UNSPECIFIED TYPE: ICD-10-CM

## 2025-06-04 DIAGNOSIS — L20.9 ATOPIC DERMATITIS, UNSPECIFIED TYPE: Primary | ICD-10-CM

## 2025-06-04 PROCEDURE — 98006 SYNCH AUDIO-VIDEO EST MOD 30: CPT | Mod: 95,,, | Performed by: ALLERGY & IMMUNOLOGY

## 2025-06-04 RX ORDER — DUPILUMAB 300 MG/2ML
300 INJECTION, SOLUTION SUBCUTANEOUS
Qty: 4 ML | Refills: 11 | Status: SHIPPED | OUTPATIENT
Start: 2025-06-04

## 2025-06-04 RX ORDER — DUPILUMAB 300 MG/2ML
300 INJECTION, SOLUTION SUBCUTANEOUS
Qty: 52 ML | Refills: 1 | Status: SHIPPED | OUTPATIENT
Start: 2025-06-04 | End: 2025-06-04 | Stop reason: SDUPTHER

## 2025-06-04 RX ORDER — DUPILUMAB 300 MG/2ML
300 INJECTION, SOLUTION SUBCUTANEOUS
Qty: 52 ML | Refills: 1 | Status: CANCELLED | OUTPATIENT
Start: 2025-06-04 | End: 2025-11-06

## 2025-06-04 RX ORDER — DUPILUMAB 300 MG/2ML
300 INJECTION, SOLUTION SUBCUTANEOUS
Qty: 4 ML | Refills: 11 | Status: SHIPPED | OUTPATIENT
Start: 2025-06-04 | End: 2025-06-04 | Stop reason: SDUPTHER

## 2025-06-04 RX ORDER — DUPILUMAB 300 MG/2ML
300 INJECTION, SOLUTION SUBCUTANEOUS
Qty: 52 ML | Refills: 1 | Status: ACTIVE | OUTPATIENT
Start: 2025-06-04 | End: 2025-11-06

## 2025-06-09 ENCOUNTER — PATIENT MESSAGE (OUTPATIENT)
Dept: ADMINISTRATIVE | Facility: OTHER | Age: 85
End: 2025-06-09
Payer: MEDICARE

## 2025-06-23 RX ORDER — FLUTICASONE PROPIONATE 50 MCG
SPRAY, SUSPENSION (ML) NASAL
Qty: 16 G | Refills: 11 | OUTPATIENT
Start: 2025-06-23

## 2025-07-02 ENCOUNTER — PATIENT MESSAGE (OUTPATIENT)
Dept: ALLERGY | Facility: CLINIC | Age: 85
End: 2025-07-02
Payer: MEDICARE

## 2025-07-07 DIAGNOSIS — J30.9 ALLERGIC RHINITIS, UNSPECIFIED SEASONALITY, UNSPECIFIED TRIGGER: ICD-10-CM

## 2025-07-07 DIAGNOSIS — L50.1 URTICARIA, IDIOPATHIC: Primary | ICD-10-CM

## 2025-07-07 RX ORDER — LEVOCETIRIZINE DIHYDROCHLORIDE 5 MG/1
5 TABLET, FILM COATED ORAL
Qty: 30 TABLET | Refills: 11 | Status: SHIPPED | OUTPATIENT
Start: 2025-07-07

## 2025-07-09 ENCOUNTER — OFFICE VISIT (OUTPATIENT)
Dept: OPTOMETRY | Facility: CLINIC | Age: 85
End: 2025-07-09
Payer: MEDICARE

## 2025-07-09 DIAGNOSIS — H47.093 OPTIC NERVE ASYMMETRY, BILATERAL: Primary | ICD-10-CM

## 2025-07-09 DIAGNOSIS — Z96.1 PSEUDOPHAKIA OF BOTH EYES: ICD-10-CM

## 2025-07-09 DIAGNOSIS — H52.4 PRESBYOPIA: ICD-10-CM

## 2025-07-09 DIAGNOSIS — H10.13 ALLERGIC CONJUNCTIVITIS OF BOTH EYES: ICD-10-CM

## 2025-07-09 DIAGNOSIS — H40.013 OPEN ANGLE WITH BORDERLINE FINDINGS AND LOW GLAUCOMA RISK IN BOTH EYES: ICD-10-CM

## 2025-07-09 PROCEDURE — 92133 CPTRZD OPH DX IMG PST SGM ON: CPT | Mod: PBBFAC,PO | Performed by: OPTOMETRIST

## 2025-07-09 PROCEDURE — 92015 DETERMINE REFRACTIVE STATE: CPT | Mod: ,,, | Performed by: OPTOMETRIST

## 2025-07-09 PROCEDURE — 99213 OFFICE O/P EST LOW 20 MIN: CPT | Mod: PBBFAC,PO | Performed by: OPTOMETRIST

## 2025-07-09 PROCEDURE — 99999 PR PBB SHADOW E&M-EST. PATIENT-LVL III: CPT | Mod: PBBFAC,,, | Performed by: OPTOMETRIST

## 2025-07-09 PROCEDURE — 99214 OFFICE O/P EST MOD 30 MIN: CPT | Mod: S$PBB,,, | Performed by: OPTOMETRIST

## 2025-07-09 RX ORDER — NEOMYCIN SULFATE, POLYMYXIN B SULFATE AND DEXAMETHASONE 3.5; 10000; 1 MG/ML; [USP'U]/ML; MG/ML
1 SUSPENSION/ DROPS OPHTHALMIC NIGHTLY
Qty: 5 ML | Refills: 0 | Status: SHIPPED | OUTPATIENT
Start: 2025-07-09 | End: 2025-07-16

## 2025-07-09 NOTE — PROGRESS NOTES
HPI    Patient is here today for a ocular health check. Last seen on 01/27/2025   with Dr. Larkin. No visual complaints with her current pair of glasses.   No eye pain, flashes of light or floaters.     Eye Meds:   Olopatadine PRN - when having a flare up   Tobradex PRN - when having a flare up     Past Ocular Sx: PCIOL OU   Last edited by Pilar Morejon on 7/9/2025  9:11 AM.            Assessment /Plan     For exam results, see Encounter Report.    Optic nerve asymmetry, bilateral   OD>OS  Open angle with borderline findings and low glaucoma risk in both eyes  -     Posterior Segment OCT Optic Nerve- Both eyes: WNL OU    Allergic conjunctivitis of both eyes  Allergies followed by Dr. Kahn--treated with Dupilumab.    Continue with Patanol and Maxitrol ointment as needed during eye flare-ups.  neomycin-polymyxin-dexamethasone (MAXITROL) 3.5mg/mL-10,000 unit/mL-0.1 % DrpS; Place 1 drop into both eyes every evening. for 7 days  Dispense: 5 mL; Refill: 0    *History of DON.  Treated by Dr. Freeman: Oct  2015 excision  No signs of recurrence. -      PVD OD  Pseudophakia of both eyes   Pt doing well since cataracts surgery.  - Pete Aug Sept 2019     Retina flat and intact OU--no holes, tears, breaks, or RDs.  Eye health normal OU.       Presbyopia   Rx specs    RTC 1 year

## 2025-07-22 ENCOUNTER — PATIENT MESSAGE (OUTPATIENT)
Dept: OPTOMETRY | Facility: CLINIC | Age: 85
End: 2025-07-22
Payer: MEDICARE

## 2025-08-07 ENCOUNTER — PATIENT MESSAGE (OUTPATIENT)
Dept: ADMINISTRATIVE | Facility: OTHER | Age: 85
End: 2025-08-07
Payer: MEDICARE

## 2025-08-12 DIAGNOSIS — L50.1 URTICARIA, IDIOPATHIC: ICD-10-CM

## 2025-08-13 RX ORDER — LEVOCETIRIZINE DIHYDROCHLORIDE 5 MG/1
5 TABLET, FILM COATED ORAL DAILY
Qty: 30 TABLET | Refills: 11 | Status: SHIPPED | OUTPATIENT
Start: 2025-08-13

## 2025-08-26 ENCOUNTER — PATIENT MESSAGE (OUTPATIENT)
Dept: INTERNAL MEDICINE | Facility: CLINIC | Age: 85
End: 2025-08-26
Payer: MEDICARE

## 2025-09-04 ENCOUNTER — OFFICE VISIT (OUTPATIENT)
Dept: INTERNAL MEDICINE | Facility: CLINIC | Age: 85
End: 2025-09-04
Payer: MEDICARE

## 2025-09-04 DIAGNOSIS — G40.309 NONINTRACTABLE GENERALIZED IDIOPATHIC EPILEPSY WITHOUT STATUS EPILEPTICUS: ICD-10-CM

## 2025-09-04 DIAGNOSIS — B35.1 ONYCHOMYCOSIS: Primary | ICD-10-CM

## 2025-09-04 DIAGNOSIS — R79.9 ABNORMAL FINDING OF BLOOD CHEMISTRY, UNSPECIFIED: ICD-10-CM

## 2025-09-04 PROCEDURE — 98006 SYNCH AUDIO-VIDEO EST MOD 30: CPT | Mod: 95,,, | Performed by: INTERNAL MEDICINE

## 2025-09-04 RX ORDER — TERBINAFINE HYDROCHLORIDE 250 MG/1
250 TABLET ORAL DAILY
Qty: 90 TABLET | Refills: 0 | Status: SHIPPED | OUTPATIENT
Start: 2025-09-04 | End: 2025-12-03

## (undated) DEVICE — GLOVE BIOGEL SKINSENSE PI 6.5

## (undated) DEVICE — Device

## (undated) DEVICE — SOL BETADINE 5%

## (undated) DEVICE — GLOVE BIOGEL SKINSENSE PI 7.5

## (undated) DEVICE — CASSETTE INFINITI

## (undated) DEVICE — SYR SLIP TIP 1CC